# Patient Record
Sex: MALE | Race: WHITE | NOT HISPANIC OR LATINO | Employment: OTHER | URBAN - METROPOLITAN AREA
[De-identification: names, ages, dates, MRNs, and addresses within clinical notes are randomized per-mention and may not be internally consistent; named-entity substitution may affect disease eponyms.]

---

## 2020-11-06 ENCOUNTER — HOSPITAL ENCOUNTER (INPATIENT)
Facility: OTHER | Age: 65
LOS: 4 days | Discharge: HOME | DRG: 244 | End: 2020-11-10
Attending: HOSPITALIST | Admitting: HOSPITALIST
Payer: MEDICARE

## 2020-11-06 DIAGNOSIS — I44.2 COMPLETE HEART BLOCK: ICD-10-CM

## 2020-11-06 DIAGNOSIS — I49.9 ARRHYTHMIA: ICD-10-CM

## 2020-11-06 DIAGNOSIS — Z95.0 PACEMAKER: ICD-10-CM

## 2020-11-06 LAB
ALBUMIN SERPL BCP-MCNC: 3.4 G/DL (ref 3.5–5.2)
ALP SERPL-CCNC: 73 U/L (ref 55–135)
ALT SERPL W/O P-5'-P-CCNC: 24 U/L (ref 10–44)
ANION GAP SERPL CALC-SCNC: 13 MMOL/L (ref 8–16)
APTT BLDCRRT: 28.3 SEC (ref 21–32)
AST SERPL-CCNC: 21 U/L (ref 10–40)
BASOPHILS # BLD AUTO: 0.04 K/UL (ref 0–0.2)
BASOPHILS NFR BLD: 0.4 % (ref 0–1.9)
BILIRUB SERPL-MCNC: 0.9 MG/DL (ref 0.1–1)
BNP SERPL-MCNC: 343 PG/ML (ref 0–99)
BUN SERPL-MCNC: 15 MG/DL (ref 8–23)
CALCIUM SERPL-MCNC: 9.4 MG/DL (ref 8.7–10.5)
CHLORIDE SERPL-SCNC: 105 MMOL/L (ref 95–110)
CO2 SERPL-SCNC: 22 MMOL/L (ref 23–29)
CREAT SERPL-MCNC: 1 MG/DL (ref 0.5–1.4)
DIFFERENTIAL METHOD: ABNORMAL
EOSINOPHIL # BLD AUTO: 0.2 K/UL (ref 0–0.5)
EOSINOPHIL NFR BLD: 2.1 % (ref 0–8)
ERYTHROCYTE [DISTWIDTH] IN BLOOD BY AUTOMATED COUNT: 18.1 % (ref 11.5–14.5)
EST. GFR  (AFRICAN AMERICAN): >60 ML/MIN/1.73 M^2
EST. GFR  (NON AFRICAN AMERICAN): >60 ML/MIN/1.73 M^2
GLUCOSE SERPL-MCNC: 156 MG/DL (ref 70–110)
HCT VFR BLD AUTO: 41.9 % (ref 40–54)
HGB BLD-MCNC: 12.9 G/DL (ref 14–18)
IMM GRANULOCYTES # BLD AUTO: 0.04 K/UL (ref 0–0.04)
IMM GRANULOCYTES NFR BLD AUTO: 0.4 % (ref 0–0.5)
INR PPP: 1 (ref 0.8–1.2)
LYMPHOCYTES # BLD AUTO: 1.7 K/UL (ref 1–4.8)
LYMPHOCYTES NFR BLD: 19 % (ref 18–48)
MAGNESIUM SERPL-MCNC: 2.3 MG/DL (ref 1.6–2.6)
MCH RBC QN AUTO: 29.1 PG (ref 27–31)
MCHC RBC AUTO-ENTMCNC: 30.8 G/DL (ref 32–36)
MCV RBC AUTO: 95 FL (ref 82–98)
MONOCYTES # BLD AUTO: 0.9 K/UL (ref 0.3–1)
MONOCYTES NFR BLD: 9.9 % (ref 4–15)
NEUTROPHILS # BLD AUTO: 6.2 K/UL (ref 1.8–7.7)
NEUTROPHILS NFR BLD: 68.2 % (ref 38–73)
NRBC BLD-RTO: 0 /100 WBC
PHOSPHATE SERPL-MCNC: 3.7 MG/DL (ref 2.7–4.5)
PLATELET # BLD AUTO: 234 K/UL (ref 150–350)
PMV BLD AUTO: 10.3 FL (ref 9.2–12.9)
POTASSIUM SERPL-SCNC: 4.7 MMOL/L (ref 3.5–5.1)
PROT SERPL-MCNC: 7.1 G/DL (ref 6–8.4)
PROTHROMBIN TIME: 11.1 SEC (ref 9–12.5)
RBC # BLD AUTO: 4.43 M/UL (ref 4.6–6.2)
SODIUM SERPL-SCNC: 140 MMOL/L (ref 136–145)
TROPONIN I SERPL DL<=0.01 NG/ML-MCNC: 0.17 NG/ML (ref 0–0.03)
WBC # BLD AUTO: 9.12 K/UL (ref 3.9–12.7)

## 2020-11-06 PROCEDURE — 25000003 PHARM REV CODE 250: Performed by: INTERNAL MEDICINE

## 2020-11-06 PROCEDURE — 84484 ASSAY OF TROPONIN QUANT: CPT

## 2020-11-06 PROCEDURE — 20000000 HC ICU ROOM

## 2020-11-06 PROCEDURE — 99223 1ST HOSP IP/OBS HIGH 75: CPT | Mod: ,,, | Performed by: NURSE PRACTITIONER

## 2020-11-06 PROCEDURE — 83735 ASSAY OF MAGNESIUM: CPT

## 2020-11-06 PROCEDURE — 85610 PROTHROMBIN TIME: CPT

## 2020-11-06 PROCEDURE — 99223 PR INITIAL HOSPITAL CARE,LEVL III: ICD-10-PCS | Mod: ,,, | Performed by: NURSE PRACTITIONER

## 2020-11-06 PROCEDURE — 63600175 PHARM REV CODE 636 W HCPCS: Performed by: NURSE PRACTITIONER

## 2020-11-06 PROCEDURE — 83880 ASSAY OF NATRIURETIC PEPTIDE: CPT

## 2020-11-06 PROCEDURE — 80053 COMPREHEN METABOLIC PANEL: CPT

## 2020-11-06 PROCEDURE — 36415 COLL VENOUS BLD VENIPUNCTURE: CPT

## 2020-11-06 PROCEDURE — 85025 COMPLETE CBC W/AUTO DIFF WBC: CPT

## 2020-11-06 PROCEDURE — 84100 ASSAY OF PHOSPHORUS: CPT

## 2020-11-06 PROCEDURE — 85730 THROMBOPLASTIN TIME PARTIAL: CPT

## 2020-11-06 RX ORDER — SODIUM CHLORIDE 0.9 % (FLUSH) 0.9 %
10 SYRINGE (ML) INJECTION
Status: DISCONTINUED | OUTPATIENT
Start: 2020-11-06 | End: 2020-11-10 | Stop reason: HOSPADM

## 2020-11-06 RX ORDER — FUROSEMIDE 10 MG/ML
20 INJECTION INTRAMUSCULAR; INTRAVENOUS DAILY
Status: DISCONTINUED | OUTPATIENT
Start: 2020-11-07 | End: 2020-11-07

## 2020-11-06 RX ORDER — ONDANSETRON 8 MG/1
8 TABLET, ORALLY DISINTEGRATING ORAL EVERY 8 HOURS PRN
Status: DISCONTINUED | OUTPATIENT
Start: 2020-11-06 | End: 2020-11-07

## 2020-11-06 RX ORDER — CLONIDINE HYDROCHLORIDE 0.1 MG/1
0.1 TABLET ORAL EVERY 4 HOURS PRN
Status: DISCONTINUED | OUTPATIENT
Start: 2020-11-06 | End: 2020-11-07

## 2020-11-06 RX ORDER — HEPARIN SODIUM,PORCINE/D5W 25000/250
12 INTRAVENOUS SOLUTION INTRAVENOUS CONTINUOUS
Status: DISCONTINUED | OUTPATIENT
Start: 2020-11-06 | End: 2020-11-07

## 2020-11-06 RX ORDER — ACETAMINOPHEN 325 MG/1
650 TABLET ORAL EVERY 4 HOURS PRN
Status: DISCONTINUED | OUTPATIENT
Start: 2020-11-06 | End: 2020-11-07

## 2020-11-06 RX ORDER — LOSARTAN POTASSIUM 25 MG/1
25 TABLET ORAL EVERY 12 HOURS
Status: DISCONTINUED | OUTPATIENT
Start: 2020-11-06 | End: 2020-11-07

## 2020-11-06 RX ORDER — ATORVASTATIN CALCIUM 20 MG/1
80 TABLET, FILM COATED ORAL DAILY
Status: DISCONTINUED | OUTPATIENT
Start: 2020-11-07 | End: 2020-11-10 | Stop reason: HOSPADM

## 2020-11-06 RX ADMIN — HEPARIN SODIUM AND DEXTROSE 12 UNITS/KG/HR: 10000; 5 INJECTION INTRAVENOUS at 10:11

## 2020-11-06 RX ADMIN — LOSARTAN POTASSIUM 25 MG: 25 TABLET, FILM COATED ORAL at 09:11

## 2020-11-06 RX ADMIN — CLONIDINE HYDROCHLORIDE 0.1 MG: 0.1 TABLET ORAL at 11:11

## 2020-11-06 NOTE — Clinical Note
Defib pads placed on patient left chest over the apex of heart and on the back on the left medial boarder of the left scapula.

## 2020-11-06 NOTE — Clinical Note
Prepped: groin and radials. Prepped with: ChloraPrep. The site was clipped. The patient was draped.

## 2020-11-06 NOTE — Clinical Note
Lead was connected to generator. A new lead was attached to the following location: right ventricle.

## 2020-11-06 NOTE — Clinical Note
The catheter insertion attempt made ostium   right coronary artery. Angiography performed of the right coronary arteries. Angiography performed via hand injection with . And removed

## 2020-11-06 NOTE — Clinical Note
The catheter is inserted ostium   left main. Angiography performed of the left coronary arteries in multiple views. Angiography performed via hand injection with .

## 2020-11-06 NOTE — Clinical Note
The catheter is inserted ostium   right coronary artery. Angiography performed of the right coronary arteries in multiple views. Angiography performed via hand injection with .

## 2020-11-06 NOTE — Clinical Note
Lead was inserted under fluorscopic guidance. The lead was inserted in the right ventricular apex.

## 2020-11-06 NOTE — Clinical Note
Radial band applied to the right radial artery. 10 cc's of air were inserted into the closure device.

## 2020-11-06 NOTE — Clinical Note
A dressing is applied to the incision. sterristrips and tegaderm applied to incision site in left upper chest

## 2020-11-07 PROBLEM — I10 ESSENTIAL HYPERTENSION: Status: ACTIVE | Noted: 2020-11-07

## 2020-11-07 LAB
ALBUMIN SERPL BCP-MCNC: 3.2 G/DL (ref 3.5–5.2)
ALP SERPL-CCNC: 71 U/L (ref 55–135)
ALT SERPL W/O P-5'-P-CCNC: 22 U/L (ref 10–44)
ANION GAP SERPL CALC-SCNC: 9 MMOL/L (ref 8–16)
APTT BLDCRRT: 35.5 SEC (ref 21–32)
APTT BLDCRRT: 35.5 SEC (ref 21–32)
AST SERPL-CCNC: 19 U/L (ref 10–40)
AV INDEX (PROSTH): 0.47
AV MEAN GRADIENT: 6 MMHG
AV PEAK GRADIENT: 12 MMHG
AV VALVE AREA: 1.65 CM2
AV VELOCITY RATIO: 0.53
BASOPHILS # BLD AUTO: 0.05 K/UL (ref 0–0.2)
BASOPHILS NFR BLD: 0.6 % (ref 0–1.9)
BILIRUB SERPL-MCNC: 1 MG/DL (ref 0.1–1)
BSA FOR ECHO PROCEDURE: 2.23 M2
BUN SERPL-MCNC: 13 MG/DL (ref 8–23)
CALCIUM SERPL-MCNC: 9.2 MG/DL (ref 8.7–10.5)
CHLORIDE SERPL-SCNC: 106 MMOL/L (ref 95–110)
CO2 SERPL-SCNC: 23 MMOL/L (ref 23–29)
CREAT SERPL-MCNC: 0.9 MG/DL (ref 0.5–1.4)
CV ECHO LV RWT: 0.77 CM
DIFFERENTIAL METHOD: ABNORMAL
DOP CALC AO PEAK VEL: 1.76 M/S
DOP CALC AO VTI: 28.18 CM
DOP CALC LVOT AREA: 3.5 CM2
DOP CALC LVOT DIAMETER: 2.12 CM
DOP CALC LVOT PEAK VEL: 0.94 M/S
DOP CALC LVOT STROKE VOLUME: 46.36 CM3
DOP CALCLVOT PEAK VEL VTI: 13.14 CM
E WAVE DECELERATION TIME: 256.71 MSEC
E/A RATIO: 1.22
ECHO LV POSTERIOR WALL: 1.31 CM (ref 0.6–1.1)
EOSINOPHIL # BLD AUTO: 0.2 K/UL (ref 0–0.5)
EOSINOPHIL NFR BLD: 2.1 % (ref 0–8)
ERYTHROCYTE [DISTWIDTH] IN BLOOD BY AUTOMATED COUNT: 18 % (ref 11.5–14.5)
EST. GFR  (AFRICAN AMERICAN): >60 ML/MIN/1.73 M^2
EST. GFR  (NON AFRICAN AMERICAN): >60 ML/MIN/1.73 M^2
FRACTIONAL SHORTENING: 9 % (ref 28–44)
GLUCOSE SERPL-MCNC: 158 MG/DL (ref 70–110)
HCT VFR BLD AUTO: 40.2 % (ref 40–54)
HGB BLD-MCNC: 12.6 G/DL (ref 14–18)
IMM GRANULOCYTES # BLD AUTO: 0.02 K/UL (ref 0–0.04)
IMM GRANULOCYTES NFR BLD AUTO: 0.3 % (ref 0–0.5)
INTERVENTRICULAR SEPTUM: 1.35 CM (ref 0.6–1.1)
LA MAJOR: 6.05 CM
LA MINOR: 6.12 CM
LA WIDTH: 3.95 CM
LEFT ATRIUM SIZE: 4.23 CM
LEFT ATRIUM VOLUME INDEX: 39.3 ML/M2
LEFT ATRIUM VOLUME: 86.42 CM3
LEFT INTERNAL DIMENSION IN SYSTOLE: 3.08 CM (ref 2.1–4)
LEFT VENTRICLE DIASTOLIC VOLUME INDEX: 21.56 ML/M2
LEFT VENTRICLE DIASTOLIC VOLUME: 47.46 ML
LEFT VENTRICLE MASS INDEX: 70 G/M2
LEFT VENTRICLE SYSTOLIC VOLUME INDEX: 17 ML/M2
LEFT VENTRICLE SYSTOLIC VOLUME: 37.38 ML
LEFT VENTRICULAR INTERNAL DIMENSION IN DIASTOLE: 3.4 CM (ref 3.5–6)
LEFT VENTRICULAR MASS: 153.06 G
LYMPHOCYTES # BLD AUTO: 1.7 K/UL (ref 1–4.8)
LYMPHOCYTES NFR BLD: 21.8 % (ref 18–48)
MAGNESIUM SERPL-MCNC: 2.2 MG/DL (ref 1.6–2.6)
MCH RBC QN AUTO: 28.8 PG (ref 27–31)
MCHC RBC AUTO-ENTMCNC: 31.3 G/DL (ref 32–36)
MCV RBC AUTO: 92 FL (ref 82–98)
MONOCYTES # BLD AUTO: 0.7 K/UL (ref 0.3–1)
MONOCYTES NFR BLD: 8.5 % (ref 4–15)
MV PEAK A VEL: 0.46 M/S
MV PEAK E VEL: 0.56 M/S
MV STENOSIS PRESSURE HALF TIME: 74.45 MS
MV VALVE AREA P 1/2 METHOD: 2.96 CM2
NEUTROPHILS # BLD AUTO: 5.3 K/UL (ref 1.8–7.7)
NEUTROPHILS NFR BLD: 66.7 % (ref 38–73)
NRBC BLD-RTO: 0 /100 WBC
PHOSPHATE SERPL-MCNC: 4.4 MG/DL (ref 2.7–4.5)
PISA TR MAX VEL: 2.2 M/S
PLATELET # BLD AUTO: 238 K/UL (ref 150–350)
PMV BLD AUTO: 10.2 FL (ref 9.2–12.9)
POTASSIUM SERPL-SCNC: 4.9 MMOL/L (ref 3.5–5.1)
PROT SERPL-MCNC: 6.5 G/DL (ref 6–8.4)
PV PEAK VELOCITY: 0.9 CM/S
RA MAJOR: 5.49 CM
RA WIDTH: 4.08 CM
RBC # BLD AUTO: 4.37 M/UL (ref 4.6–6.2)
SINUS: 3.49 CM
SODIUM SERPL-SCNC: 138 MMOL/L (ref 136–145)
STJ: 2.82 CM
TR MAX PG: 19 MMHG
WBC # BLD AUTO: 7.98 K/UL (ref 3.9–12.7)

## 2020-11-07 PROCEDURE — 93010 EKG 12-LEAD: ICD-10-PCS | Mod: ,,, | Performed by: INTERNAL MEDICINE

## 2020-11-07 PROCEDURE — 63600175 PHARM REV CODE 636 W HCPCS: Performed by: NURSE PRACTITIONER

## 2020-11-07 PROCEDURE — 25000003 PHARM REV CODE 250: Performed by: NURSE PRACTITIONER

## 2020-11-07 PROCEDURE — 99223 PR INITIAL HOSPITAL CARE,LEVL III: ICD-10-PCS | Mod: ,,, | Performed by: INTERNAL MEDICINE

## 2020-11-07 PROCEDURE — 93010 ELECTROCARDIOGRAM REPORT: CPT | Mod: ,,, | Performed by: INTERNAL MEDICINE

## 2020-11-07 PROCEDURE — 84100 ASSAY OF PHOSPHORUS: CPT

## 2020-11-07 PROCEDURE — 99233 PR SUBSEQUENT HOSPITAL CARE,LEVL III: ICD-10-PCS | Mod: ,,, | Performed by: HOSPITALIST

## 2020-11-07 PROCEDURE — 85025 COMPLETE CBC W/AUTO DIFF WBC: CPT

## 2020-11-07 PROCEDURE — 27200188 HC TRANSDUCER, ART ADULT/PEDS

## 2020-11-07 PROCEDURE — 99223 1ST HOSP IP/OBS HIGH 75: CPT | Mod: ,,, | Performed by: INTERNAL MEDICINE

## 2020-11-07 PROCEDURE — 80053 COMPREHEN METABOLIC PANEL: CPT

## 2020-11-07 PROCEDURE — 51702 INSERT TEMP BLADDER CATH: CPT

## 2020-11-07 PROCEDURE — 25000003 PHARM REV CODE 250: Performed by: INTERNAL MEDICINE

## 2020-11-07 PROCEDURE — 20000000 HC ICU ROOM

## 2020-11-07 PROCEDURE — 99233 SBSQ HOSP IP/OBS HIGH 50: CPT | Mod: ,,, | Performed by: HOSPITALIST

## 2020-11-07 PROCEDURE — 83735 ASSAY OF MAGNESIUM: CPT

## 2020-11-07 PROCEDURE — 99900035 HC TECH TIME PER 15 MIN (STAT)

## 2020-11-07 PROCEDURE — 63600175 PHARM REV CODE 636 W HCPCS: Performed by: INTERNAL MEDICINE

## 2020-11-07 PROCEDURE — 93041 RHYTHM ECG TRACING: CPT

## 2020-11-07 PROCEDURE — 36415 COLL VENOUS BLD VENIPUNCTURE: CPT

## 2020-11-07 PROCEDURE — 85730 THROMBOPLASTIN TIME PARTIAL: CPT

## 2020-11-07 PROCEDURE — 93005 ELECTROCARDIOGRAM TRACING: CPT

## 2020-11-07 RX ORDER — LOSARTAN POTASSIUM 50 MG/1
50 TABLET ORAL EVERY 12 HOURS
Status: DISCONTINUED | OUTPATIENT
Start: 2020-11-08 | End: 2020-11-10 | Stop reason: HOSPADM

## 2020-11-07 RX ORDER — ALPRAZOLAM 0.25 MG/1
0.25 TABLET ORAL 3 TIMES DAILY PRN
Status: DISCONTINUED | OUTPATIENT
Start: 2020-11-07 | End: 2020-11-10 | Stop reason: HOSPADM

## 2020-11-07 RX ORDER — HEPARIN SODIUM 5000 [USP'U]/ML
5000 INJECTION, SOLUTION INTRAVENOUS; SUBCUTANEOUS EVERY 8 HOURS
Status: COMPLETED | OUTPATIENT
Start: 2020-11-07 | End: 2020-11-08

## 2020-11-07 RX ORDER — LOSARTAN POTASSIUM 50 MG/1
100 TABLET ORAL DAILY
Status: DISCONTINUED | OUTPATIENT
Start: 2020-11-07 | End: 2020-11-07

## 2020-11-07 RX ORDER — MUPIROCIN 20 MG/G
OINTMENT TOPICAL 2 TIMES DAILY
Status: DISCONTINUED | OUTPATIENT
Start: 2020-11-07 | End: 2020-11-10 | Stop reason: HOSPADM

## 2020-11-07 RX ORDER — ONDANSETRON 2 MG/ML
8 INJECTION INTRAMUSCULAR; INTRAVENOUS EVERY 8 HOURS PRN
Status: DISCONTINUED | OUTPATIENT
Start: 2020-11-07 | End: 2020-11-09

## 2020-11-07 RX ORDER — TRAMADOL HYDROCHLORIDE 50 MG/1
50 TABLET ORAL EVERY 6 HOURS PRN
Status: DISCONTINUED | OUTPATIENT
Start: 2020-11-07 | End: 2020-11-10 | Stop reason: HOSPADM

## 2020-11-07 RX ORDER — HYDROCHLOROTHIAZIDE 12.5 MG/1
25 TABLET ORAL DAILY
Status: DISCONTINUED | OUTPATIENT
Start: 2020-11-07 | End: 2020-11-07

## 2020-11-07 RX ORDER — CLONIDINE HYDROCHLORIDE 0.1 MG/1
0.1 TABLET ORAL EVERY 4 HOURS PRN
Status: DISCONTINUED | OUTPATIENT
Start: 2020-11-07 | End: 2020-11-07

## 2020-11-07 RX ORDER — LOSARTAN POTASSIUM 50 MG/1
50 TABLET ORAL ONCE
Status: COMPLETED | OUTPATIENT
Start: 2020-11-07 | End: 2020-11-07

## 2020-11-07 RX ORDER — NIFEDIPINE 30 MG/1
30 TABLET, EXTENDED RELEASE ORAL DAILY
Status: DISCONTINUED | OUTPATIENT
Start: 2020-11-07 | End: 2020-11-09

## 2020-11-07 RX ORDER — CLONIDINE HYDROCHLORIDE 0.1 MG/1
0.2 TABLET ORAL 3 TIMES DAILY
Status: DISCONTINUED | OUTPATIENT
Start: 2020-11-07 | End: 2020-11-07

## 2020-11-07 RX ORDER — FUROSEMIDE 40 MG/1
40 TABLET ORAL DAILY
Status: DISCONTINUED | OUTPATIENT
Start: 2020-11-08 | End: 2020-11-10

## 2020-11-07 RX ORDER — FUROSEMIDE 20 MG/1
20 TABLET ORAL DAILY
Status: DISCONTINUED | OUTPATIENT
Start: 2020-11-08 | End: 2020-11-07

## 2020-11-07 RX ORDER — ASPIRIN 81 MG/1
81 TABLET ORAL DAILY
Status: DISCONTINUED | OUTPATIENT
Start: 2020-11-07 | End: 2020-11-10 | Stop reason: HOSPADM

## 2020-11-07 RX ORDER — TALC
3 POWDER (GRAM) TOPICAL ONCE
Status: COMPLETED | OUTPATIENT
Start: 2020-11-07 | End: 2020-11-07

## 2020-11-07 RX ORDER — ACETAMINOPHEN 500 MG
1000 TABLET ORAL EVERY 8 HOURS PRN
Status: DISCONTINUED | OUTPATIENT
Start: 2020-11-07 | End: 2020-11-10 | Stop reason: HOSPADM

## 2020-11-07 RX ADMIN — HEPARIN SODIUM 5000 UNITS: 5000 INJECTION INTRAVENOUS; SUBCUTANEOUS at 03:11

## 2020-11-07 RX ADMIN — CLONIDINE HYDROCHLORIDE 0.2 MG: 0.1 TABLET ORAL at 08:11

## 2020-11-07 RX ADMIN — TRAMADOL HYDROCHLORIDE 50 MG: 50 TABLET, FILM COATED ORAL at 11:11

## 2020-11-07 RX ADMIN — ALPRAZOLAM 0.25 MG: 0.25 TABLET ORAL at 08:11

## 2020-11-07 RX ADMIN — HYDROCHLOROTHIAZIDE 25 MG: 12.5 TABLET ORAL at 11:11

## 2020-11-07 RX ADMIN — ATORVASTATIN CALCIUM 80 MG: 20 TABLET, FILM COATED ORAL at 08:11

## 2020-11-07 RX ADMIN — LOSARTAN POTASSIUM 100 MG: 50 TABLET ORAL at 08:11

## 2020-11-07 RX ADMIN — ASPIRIN 81 MG: 81 TABLET, COATED ORAL at 11:11

## 2020-11-07 RX ADMIN — CLONIDINE HYDROCHLORIDE 0.1 MG: 0.1 TABLET ORAL at 03:11

## 2020-11-07 RX ADMIN — CLONIDINE HYDROCHLORIDE 0.2 MG: 0.1 TABLET ORAL at 04:11

## 2020-11-07 RX ADMIN — Medication 3 MG: at 01:11

## 2020-11-07 RX ADMIN — LOSARTAN POTASSIUM 50 MG: 50 TABLET ORAL at 01:11

## 2020-11-07 RX ADMIN — HEPARIN SODIUM 5000 UNITS: 5000 INJECTION INTRAVENOUS; SUBCUTANEOUS at 09:11

## 2020-11-07 RX ADMIN — FUROSEMIDE 20 MG: 10 INJECTION, SOLUTION INTRAMUSCULAR; INTRAVENOUS at 08:11

## 2020-11-07 RX ADMIN — NIFEDIPINE 30 MG: 30 TABLET, FILM COATED, EXTENDED RELEASE ORAL at 08:11

## 2020-11-07 RX ADMIN — TRAMADOL HYDROCHLORIDE 50 MG: 50 TABLET, FILM COATED ORAL at 05:11

## 2020-11-07 NOTE — SUBJECTIVE & OBJECTIVE
No past medical history on file.    No past surgical history on file.    Review of patient's allergies indicates:  No Known Allergies    No current facility-administered medications on file prior to encounter.      No current outpatient medications on file prior to encounter.     Family History     Family history is unknown by patient.        Tobacco Use    Smoking status: Not on file   Substance and Sexual Activity    Alcohol use: Not on file    Drug use: Not on file    Sexual activity: Not on file     Review of Systems   Constitutional: Positive for activity change and fatigue. Negative for appetite change and fever.   HENT: Negative for congestion, ear pain and postnasal drip.    Eyes: Negative for discharge.   Respiratory: Positive for shortness of breath. Negative for apnea and wheezing.    Cardiovascular: Negative for chest pain and leg swelling.   Gastrointestinal: Negative for abdominal distention, abdominal pain, nausea and vomiting.   Endocrine: Negative for polydipsia, polyphagia and polyuria.   Genitourinary: Negative for difficulty urinating, flank pain, frequency, hematuria and urgency.   Musculoskeletal: Positive for myalgias. Negative for arthralgias and joint swelling.   Skin: Negative for pallor and rash.   Allergic/Immunologic: Negative for environmental allergies and food allergies.   Neurological: Negative for dizziness, speech difficulty, weakness, light-headedness and headaches.   Hematological: Does not bruise/bleed easily.   Psychiatric/Behavioral: Negative for agitation.     Objective:     Vital Signs (Most Recent):  Temp: 98.5 °F (36.9 °C) (11/06/20 2015)  Pulse: 60 (11/06/20 2300)  Resp: (!) 34 (11/06/20 2300)  BP: (!) 197/88 (11/06/20 2315)  SpO2: 98 % (11/06/20 2300) Vital Signs (24h Range):  Temp:  [98.5 °F (36.9 °C)] 98.5 °F (36.9 °C)  Pulse:  [60] 60  Resp:  [16-34] 34  SpO2:  [96 %-100 %] 98 %  BP: (148-198)/() 197/88     Weight: 98.4 kg (216 lb 14.9 oz)  Body mass index  is 29.42 kg/m².    Physical Exam  Constitutional:       Appearance: Normal appearance. He is well-developed.   HENT:      Head: Normocephalic.   Eyes:      Conjunctiva/sclera: Conjunctivae normal.   Neck:      Musculoskeletal: Normal range of motion and neck supple.   Cardiovascular:      Rate and Rhythm: Normal rate.      Pulses:           Radial pulses are 2+ on the right side and 2+ on the left side.        Dorsalis pedis pulses are 2+ on the right side and 2+ on the left side.      Heart sounds: Normal heart sounds.      Comments: 100% paced  Pulmonary:      Effort: Pulmonary effort is normal. Tachypnea present.      Breath sounds: Normal breath sounds.   Abdominal:      General: Bowel sounds are increased. There is no distension.      Palpations: Abdomen is soft.      Tenderness: There is no abdominal tenderness.   Musculoskeletal: Normal range of motion.   Skin:     General: Skin is warm and dry.      Capillary Refill: Capillary refill takes 2 to 3 seconds.   Neurological:      Mental Status: He is alert and oriented to person, place, and time.      GCS: GCS eye subscore is 4. GCS verbal subscore is 5. GCS motor subscore is 6.      Motor: Motor function is intact.   Psychiatric:         Mood and Affect: Mood normal.         Speech: Speech normal.         Behavior: Behavior normal.             Significant Labs:   CBC:   Recent Labs   Lab 11/06/20 2056   WBC 9.12   HGB 12.9*   HCT 41.9        CMP:   Recent Labs   Lab 11/06/20 2055      K 4.7      CO2 22*   *   BUN 15   CREATININE 1.0   CALCIUM 9.4   PROT 7.1   ALBUMIN 3.4*   BILITOT 0.9   ALKPHOS 73   AST 21   ALT 24   ANIONGAP 13   EGFRNONAA >60       Significant Imaging: I have reviewed all pertinent imaging results/findings within the past 24 hours.

## 2020-11-07 NOTE — PLAN OF CARE
CM met with pt for initial discharge planning assessment. Pt sleeping soundly, arouses.    Pt lives alone, has no PCP at this time, CM attempt to help pt find PCP.    Pt uses a cane for DME, has no HH and is not on HD.    Pt's sister lives next door to pt.    CM to follow for plans and arrangemetns.   11/07/20 1630   Discharge Assessment   Assessment Type Discharge Planning Assessment   Confirmed/corrected address and phone number on facesheet? Yes   Assessment information obtained from? Patient;Medical Record   Expected Length of Stay (days) 3   Communicated expected length of stay with patient/caregiver yes   Prior to hospitilization cognitive status: Alert/Oriented   Prior to hospitalization functional status: Assistive Equipment;Independent   Current cognitive status: Alert/Oriented   Current Functional Status: Independent   Lives With alone   Able to Return to Prior Arrangements yes   Is patient able to care for self after discharge? Yes   Patient's perception of discharge disposition home or selfcare   Readmission Within the Last 30 Days no previous admission in last 30 days   Patient currently being followed by outpatient case management? No   Patient currently receives any other outside agency services? Yes   Equipment Currently Used at Home cane, straight   Do you have any problems affording any of your prescribed medications? TBD   Is the patient taking medications as prescribed? yes

## 2020-11-07 NOTE — PROGRESS NOTES
Pt remains hypertensive despite additional Losartan and PRN clonidine x2. Spoke with Dr. Braxton in eICU a second time regarding BP. Order for an additional 0.1 of clonidine now and that she will modify orders. Safety measures remain in place.

## 2020-11-07 NOTE — EICU
/77,PR59/MIN    On losartan 25 mg q 12hours    On camera alert and awake    Admitted with complete heart block      Plan:  Losartan 50 mg now and increased daily dose to 100mg daily starting at 9AM

## 2020-11-07 NOTE — CONSULTS
[facility]    Date of admission:  11/6/2020     Chief Complaint  Fatigue and exertional dyspnea    HPI:  This 65-year-old male with no prior cardiac history presented to [facility] in [place] for worsening fatigue.  He has been getting dyspneic with minimal exertion.  He has had no lightheadedness or syncope.  His symptoms have been ongoing for at least 4 weeks.  When he exerts himself he has no chest discomfort.  On presentation to the emergency department, he was found to be in complete heart block.  He was transferred here for definitive therapy.  A temporary pacemaker was implanted prior to transfer.  He was also started on treatment for acute coronary syndrome.    On questioning, he may have a remote history of atrial fibrillation.  This diagnosis was entertained in [place] about 10 years ago.  He was not started on any medications.  He did not follow-up with any physicians regarding that possible diagnosis.  He is not taking any regular medications at home.    Medications  Current Facility-Administered Medications   Medication Dose Route Frequency Provider Last Rate Last Dose    acetaminophen tablet 650 mg  650 mg Oral Q4H PRN Dheeraj Lopez NP        ALPRAZolam tablet 0.25 mg  0.25 mg Oral TID PRN Dheeraj Lopez NP        atorvastatin tablet 80 mg  80 mg Oral Daily Dheeraj Lopez NP   80 mg at 11/07/20 0832    cloNIDine tablet 0.2 mg  0.2 mg Oral TID Suyapa Braxton MD   0.2 mg at 11/07/20 0832    furosemide injection 20 mg  20 mg Intravenous Daily Dheeraj Lopez NP   20 mg at 11/07/20 0831    heparin 25,000 units in dextrose 5% (100 units/ml) IV bolus from bag - ADDITIONAL PRN BOLUS - 30 units/kg (max bolus 4000 units)  30 Units/kg (Adjusted) Intravenous PRN Dheeraj Lopez NP   2,580 Units at 11/07/20 0554    heparin 25,000 units in dextrose 5% (100 units/ml) IV bolus from bag - ADDITIONAL PRN BOLUS - 60 units/kg (max bolus 4000 units)   46.6 Units/kg (Adjusted) Intravenous PRN Dheeraj Lopez NP        heparin 25,000 units in dextrose 5% 250 mL (100 units/mL) infusion LOW INTENSITY nomogram - [facility]  12 Units/kg/hr (Adjusted) Intravenous Continuous Dheeraj Lopez NP 12 mL/hr at 11/07/20 0547 14 Units/kg/hr at 11/07/20 0547    influenza (QUADRIVALENT ADJUVANTED PF) vaccine 0.5 mL  0.5 mL Intramuscular vaccine x 1 dose Dung Mercedes MD        losartan tablet 100 mg  100 mg Oral Daily Suyapa Braxton MD   100 mg at 11/07/20 0841    mupirocin 2 % ointment   Nasal BID Dung Mercedes MD        ondansetron disintegrating tablet 8 mg  8 mg Oral Q8H PRN Dheeraj Lopez NP        pneumoc 13-jermaine conj-dip cr(PF) (PREVNAR 13 (PF)) 0.5 mL  0.5 mL Intramuscular vaccine x 1 dose Dung Mercedes MD        sodium chloride 0.9% flush 10 mL  10 mL Intravenous PRN Dheeraj Lopez NP        traMADoL tablet 50 mg  50 mg Oral Q6H PRN Dheeraj Lopez NP   50 mg at 11/07/20 0522      Prior to Admission medications    Not on File       History  No past medical history on file.  No past surgical history on file.  Social History     Socioeconomic History    Marital status: Unknown     Spouse name: Not on file    Number of children: Not on file    Years of education: Not on file    Highest education level: Not on file   Occupational History    Not on file   Social Needs    Financial resource strain: Not on file    Food insecurity     Worry: Not on file     Inability: Not on file    Transportation needs     Medical: Not on file     Non-medical: Not on file   Tobacco Use    Smoking status: Not on file   Substance and Sexual Activity    Alcohol use: Not on file    Drug use: Not on file    Sexual activity: Not on file   Lifestyle    Physical activity     Days per week: Not on file     Minutes per session: Not on file    Stress: Not on file   Relationships    Social connections     Talks on phone: Not on file     Gets  together: Not on file     Attends Gnosticist service: Not on file     Active member of club or organization: Not on file     Attends meetings of clubs or organizations: Not on file     Relationship status: Not on file   Other Topics Concern    Not on file   Social History Narrative    Not on file     Family History   Family history unknown: Yes        Allergies  Review of patient's allergies indicates:  No Known Allergies    Review of Systems   Review of Systems   Constitution: Positive for malaise/fatigue. Negative for weight gain and weight loss.   Eyes: Negative for visual disturbance.   Cardiovascular: Positive for dyspnea on exertion. Negative for chest pain, claudication, cyanosis, irregular heartbeat, leg swelling, near-syncope, orthopnea, palpitations, paroxysmal nocturnal dyspnea and syncope.   Respiratory: Negative for cough, hemoptysis, shortness of breath, sleep disturbances due to breathing and wheezing.    Hematologic/Lymphatic: Negative for bleeding problem. Does not bruise/bleed easily.   Skin: Negative for poor wound healing.   Musculoskeletal: Negative for muscle cramps and myalgias.   Gastrointestinal: Negative for abdominal pain, anorexia, diarrhea, heartburn, hematemesis, hematochezia, melena, nausea and vomiting.   Genitourinary: Negative for hematuria and nocturia.   Neurological: Negative for excessive daytime sleepiness, dizziness, focal weakness, light-headedness and weakness.       Physical Exam  Temp:  [97.9 °F (36.6 °C)-98 °F (36.7 °C)]   Pulse:  [60]   Resp:  [16-34]   BP: (141-198)/(62-88)   SpO2:  [92 %-98 %]    Wt Readings from Last 1 Encounters:   11/06/20 98.4 kg (216 lb 14.9 oz)     Physical Exam   Constitutional: He is oriented to person, place, and time. He is cooperative.  Non-toxic appearance. No distress.   HENT:   Head: Normocephalic and atraumatic.   Eyes: Conjunctivae are normal. No scleral icterus.   Neck: Neck supple. No hepatojugular reflux and no JVD present. Carotid  bruit is not present. No tracheal deviation present. No thyromegaly present.   Cardiovascular: Normal rate, regular rhythm, S1 normal and S2 normal.  No extrasystoles are present. PMI is not displaced. Exam reveals no S3 and no S4.   No murmur heard.  Pulses:       Carotid pulses are 2+ on the right side and 2+ on the left side.       Radial pulses are 2+ on the right side and 2+ on the left side.        Dorsalis pedis pulses are 2+ on the right side and 2+ on the left side.        Posterior tibial pulses are 2+ on the right side and 2+ on the left side.   There is a 5 [place] sheath in the right femoral vein through which the temporary pacemaker is inserted.   Pulmonary/Chest: No accessory muscle usage. No respiratory distress. He has no decreased breath sounds. He has no wheezes. He has no rhonchi. He has no rales.   Abdominal: Soft. Bowel sounds are normal. He exhibits no pulsatile liver, no abdominal bruit and no pulsatile midline mass. There is no splenomegaly or hepatomegaly. There is no abdominal tenderness.   Musculoskeletal:         General: No tenderness, deformity or edema.   Neurological: He is alert and oriented to person, place, and time. He has normal strength. No cranial nerve deficit or sensory deficit.   Skin: Skin is warm, dry and intact. He is not diaphoretic. No cyanosis. No pallor. Nails show no clubbing.   Psychiatric: He has a normal mood and affect. His speech is normal and behavior is normal.       Telemetry  Ventricular pacing    EKG  Sinus rhythm with complete heart block.  Ventricular escape rhythm around 30 beats per minute    Echocardiogram  Echocardiogram performed at [facility] hospital suggested an ejection fraction approximately 40%.  The study will be repeated here.    Labs  Recent Results (from the past 72 hour(s))   APTT    Collection Time: 11/06/20  8:55 PM   Result Value Ref Range    aPTT 28.3 21.0 - 32.0 sec   Protime-INR    Collection Time: 11/06/20  8:55 PM   Result Value  Ref Range    Prothrombin Time 11.1 9.0 - 12.5 sec    INR 1.0 0.8 - 1.2   Troponin I    Collection Time: 11/06/20  8:55 PM   Result Value Ref Range    Troponin I 0.166 (H) 0.000 - 0.026 ng/mL   Comprehensive Metabolic Panel (CMP)    Collection Time: 11/06/20  8:55 PM   Result Value Ref Range    Sodium 140 136 - 145 mmol/L    Potassium 4.7 3.5 - 5.1 mmol/L    Chloride 105 95 - 110 mmol/L    CO2 22 (L) 23 - 29 mmol/L    Glucose 156 (H) 70 - 110 mg/dL    BUN 15 8 - 23 mg/dL    Creatinine 1.0 0.5 - 1.4 mg/dL    Calcium 9.4 8.7 - 10.5 mg/dL    Total Protein 7.1 6.0 - 8.4 g/dL    Albumin 3.4 (L) 3.5 - 5.2 g/dL    Total Bilirubin 0.9 0.1 - 1.0 mg/dL    Alkaline Phosphatase 73 55 - 135 U/L    AST 21 10 - 40 U/L    ALT 24 10 - 44 U/L    Anion Gap 13 8 - 16 mmol/L    eGFR if African American >60 >60 mL/min/1.73 m^2    eGFR if non African American >60 >60 mL/min/1.73 m^2   Magnesium    Collection Time: 11/06/20  8:55 PM   Result Value Ref Range    Magnesium 2.3 1.6 - 2.6 mg/dL   Phosphorus    Collection Time: 11/06/20  8:55 PM   Result Value Ref Range    Phosphorus 3.7 2.7 - 4.5 mg/dL   CBC auto differential    Collection Time: 11/06/20  8:56 PM   Result Value Ref Range    WBC 9.12 3.90 - 12.70 K/uL    RBC 4.43 (L) 4.60 - 6.20 M/uL    Hemoglobin 12.9 (L) 14.0 - 18.0 g/dL    Hematocrit 41.9 40.0 - 54.0 %    MCV 95 82 - 98 fL    MCH 29.1 27.0 - 31.0 pg    MCHC 30.8 (L) 32.0 - 36.0 g/dL    RDW 18.1 (H) 11.5 - 14.5 %    Platelets 234 150 - 350 K/uL    MPV 10.3 9.2 - 12.9 fL    Immature Granulocytes 0.4 0.0 - 0.5 %    Gran # (ANC) 6.2 1.8 - 7.7 K/uL    Immature Grans (Abs) 0.04 0.00 - 0.04 K/uL    Lymph # 1.7 1.0 - 4.8 K/uL    Mono # 0.9 0.3 - 1.0 K/uL    Eos # 0.2 0.0 - 0.5 K/uL    Baso # 0.04 0.00 - 0.20 K/uL    nRBC 0 0 /100 WBC    Gran % 68.2 38.0 - 73.0 %    Lymph % 19.0 18.0 - 48.0 %    Mono % 9.9 4.0 - 15.0 %    Eosinophil % 2.1 0.0 - 8.0 %    Basophil % 0.4 0.0 - 1.9 %    Differential Method Automated    Brain natriuretic  peptide    Collection Time: 11/06/20  8:56 PM   Result Value Ref Range     (H) 0 - 99 pg/mL   APTT    Collection Time: 11/07/20  4:46 AM   Result Value Ref Range    aPTT 35.5 (H) 21.0 - 32.0 sec   Comprehensive Metabolic Panel (CMP)    Collection Time: 11/07/20  4:46 AM   Result Value Ref Range    Sodium 138 136 - 145 mmol/L    Potassium 4.9 3.5 - 5.1 mmol/L    Chloride 106 95 - 110 mmol/L    CO2 23 23 - 29 mmol/L    Glucose 158 (H) 70 - 110 mg/dL    BUN 13 8 - 23 mg/dL    Creatinine 0.9 0.5 - 1.4 mg/dL    Calcium 9.2 8.7 - 10.5 mg/dL    Total Protein 6.5 6.0 - 8.4 g/dL    Albumin 3.2 (L) 3.5 - 5.2 g/dL    Total Bilirubin 1.0 0.1 - 1.0 mg/dL    Alkaline Phosphatase 71 55 - 135 U/L    AST 19 10 - 40 U/L    ALT 22 10 - 44 U/L    Anion Gap 9 8 - 16 mmol/L    eGFR if African American >60 >60 mL/min/1.73 m^2    eGFR if non African American >60 >60 mL/min/1.73 m^2   Magnesium    Collection Time: 11/07/20  4:46 AM   Result Value Ref Range    Magnesium 2.2 1.6 - 2.6 mg/dL   Phosphorus    Collection Time: 11/07/20  4:46 AM   Result Value Ref Range    Phosphorus 4.4 2.7 - 4.5 mg/dL   CBC with Automated Differential    Collection Time: 11/07/20  4:46 AM   Result Value Ref Range    WBC 7.98 3.90 - 12.70 K/uL    RBC 4.37 (L) 4.60 - 6.20 M/uL    Hemoglobin 12.6 (L) 14.0 - 18.0 g/dL    Hematocrit 40.2 40.0 - 54.0 %    MCV 92 82 - 98 fL    MCH 28.8 27.0 - 31.0 pg    MCHC 31.3 (L) 32.0 - 36.0 g/dL    RDW 18.0 (H) 11.5 - 14.5 %    Platelets 238 150 - 350 K/uL    MPV 10.2 9.2 - 12.9 fL    Immature Granulocytes 0.3 0.0 - 0.5 %    Gran # (ANC) 5.3 1.8 - 7.7 K/uL    Immature Grans (Abs) 0.02 0.00 - 0.04 K/uL    Lymph # 1.7 1.0 - 4.8 K/uL    Mono # 0.7 0.3 - 1.0 K/uL    Eos # 0.2 0.0 - 0.5 K/uL    Baso # 0.05 0.00 - 0.20 K/uL    nRBC 0 0 /100 WBC    Gran % 66.7 38.0 - 73.0 %    Lymph % 21.8 18.0 - 48.0 %    Mono % 8.5 4.0 - 15.0 %    Eosinophil % 2.1 0.0 - 8.0 %    Basophil % 0.6 0.0 - 1.9 %    Differential Method Automated    APTT     Collection Time: 11/07/20  4:46 AM   Result Value Ref Range    aPTT 35.5 (H) 21.0 - 32.0 sec        Imaging  No results found.    Assessment  1.  Complete heart block  Symptomatic with left ventricular escape rhythm.  His temporary pacemaker is functioning well.  Capture threshold now is about 1.  He continues to have an underlying ventricular escape rhythm.    2.  Acute coronary syndrome?  I am uncertain why this diagnosis was entertained.  His cardiac enzymes do not seem to be significantly elevated.  He has no angina.  His exertional dyspnea could certainly all be explained by the complete heart block.    3.  Hypertension  Blood pressures have been elevated but no clear diagnosis in the past.  Lack of diagnosis is probably due to no regular medical follow-up.    Plan and Discussion  Echocardiogram today to reassess left ventricular contractility.  Plan for pacemaker implantation on Monday. The procedure, its risks, benefits and alternatives were discussed in detail.  All questions were answered.  Appropriate consents were signed.  Depending on results of initial evaluation, decide about appropriateness of an ischemic workup prior to implantation.  He was also counseled on the importance of smoking cessation.      Nolan Hernandez MD

## 2020-11-07 NOTE — EICU
/80    Received clonidine 0.1 mg 1 hour ago   On clonidine 0.1 mg every 4 hours prn     Plan:  Ordered clonidine 0.2 mg now and TID  Discontinued the PRN clonidine as it may cause rebound hypertension

## 2020-11-07 NOTE — PLAN OF CARE
See previous notes regarding pt's care since transfer and orders received. Pt remains hypertensive. Pt requires almost constant reminding that he cannot get up out of bed and that right leg should remain straight. Pt with little sleep despite melatonin administration. Pt becoming restless, complaining of severe back pain related to not being allowed to get out of bed, CHEKO Lopez made aware of pt's complaints. Pt remains on heparin gtt, awaiting results of AM aPTT. Bed alarm remains on, and other safety measures remain in place. Will continue to monitor.

## 2020-11-07 NOTE — CARE UPDATE
[facility] TRANSPORT NOTE     Date of Transport: 2020  : 1955  Age: 64 y.o.  Medication Dosing Weight: 103 kg  Sex: male  Race: Unknown    MRN: [id number]  Time Of Patient Handoff: 20:00    ASSESSMENT/INTERVENTIONS     This patient was transported by [facility] from the ICU of [facility] in [place] by [facility]. The patient's overall condition remained unchanged throughout transport, with all vital signs remaining stable per the patient's current baseline. All lines, tubes, and devices remained patent and intact. The patient was transferred from the [facility] stretcher to ICU bed 8 where care was transitioned to bedside Debora ROSE without incident. The patient's Personal Belongings and OSH Chart and Diagnostic Disk(s) were left with receiving clinician.     Last set of vital signs at transfer of care as follows:    HR 60, /72, RR 14, SpO2 97% on room air Temp 98.5 F.         FOLLOW-UP     Call [facility], Wale Fulton RN at [phone] for additional questions or concerns.

## 2020-11-07 NOTE — PROGRESS NOTES
Pt remains hypertensive. eICU called, spoke with Celina, and notified of need for PRN medication. Advised that message would be sent to eICU physician covering unit.

## 2020-11-07 NOTE — H&P
[facility] Hospital Medicine  History & Physical    Patient Name: Darren Patel  MRN: [id number]  Admission Date: 11/6/2020  Attending Physician: Dung Mercedes MD   Primary Care Provider: No primary care provider on file.         Patient information was obtained from patient, past medical records and ER records.     Subjective:     Principal Problem:Complete heart block    Chief Complaint: No chief complaint on file.       HPI: The patient is a 64 year old male with no known past medical history presents with 6 weeks of fatigue and worsening dyspnea on exertion.  He denied any presyncope, syncope, nausea, diaphoresis, chest pain.  No prior history of cardiac disease, no family history of cardiac disease.  The patient was bradycardic in the 30s and EKG showed complete heart block.  Had temporary transvenous pacer placed.  His EF is 35%, and EKG showing RBBB TWI in V2, qTC 520s-530s.  The patient is on Lasix 20mg IV daily.  The patient was transferred for Cardiology consult and further management of his third degree heart block.    No past medical history on file.    No past surgical history on file.    Review of patient's allergies indicates:  No Known Allergies    No current facility-administered medications on file prior to encounter.      No current outpatient medications on file prior to encounter.     Family History     Family history is unknown by patient.        Tobacco Use    Smoking status: Not on file   Substance and Sexual Activity    Alcohol use: Not on file    Drug use: Not on file    Sexual activity: Not on file     Review of Systems   Constitutional: Positive for activity change and fatigue. Negative for appetite change and fever.   HENT: Negative for congestion, ear pain and postnasal drip.    Eyes: Negative for discharge.   Respiratory: Positive for shortness of breath. Negative for apnea and wheezing.    Cardiovascular: Negative for chest pain and leg swelling.    Gastrointestinal: Negative for abdominal distention, abdominal pain, nausea and vomiting.   Endocrine: Negative for polydipsia, polyphagia and polyuria.   Genitourinary: Negative for difficulty urinating, flank pain, frequency, hematuria and urgency.   Musculoskeletal: Positive for myalgias. Negative for arthralgias and joint swelling.   Skin: Negative for pallor and rash.   Allergic/Immunologic: Negative for environmental allergies and food allergies.   Neurological: Negative for dizziness, speech difficulty, weakness, light-headedness and headaches.   Hematological: Does not bruise/bleed easily.   Psychiatric/Behavioral: Negative for agitation.     Objective:     Vital Signs (Most Recent):  Temp: 98.5 °F (36.9 °C) (11/06/20 2015)  Pulse: 60 (11/06/20 2300)  Resp: (!) 34 (11/06/20 2300)  BP: (!) 197/88 (11/06/20 2315)  SpO2: 98 % (11/06/20 2300) Vital Signs (24h Range):  Temp:  [98.5 °F (36.9 °C)] 98.5 °F (36.9 °C)  Pulse:  [60] 60  Resp:  [16-34] 34  SpO2:  [96 %-100 %] 98 %  BP: (148-198)/() 197/88     Weight: 98.4 kg (216 lb 14.9 oz)  Body mass index is 29.42 kg/m².    Physical Exam  Constitutional:       Appearance: Normal appearance. He is well-developed.   HENT:      Head: Normocephalic.   Eyes:      Conjunctiva/sclera: Conjunctivae normal.   Neck:      Musculoskeletal: Normal range of motion and neck supple.   Cardiovascular:      Rate and Rhythm: Normal rate.      Pulses:           Radial pulses are 2+ on the right side and 2+ on the left side.        Dorsalis pedis pulses are 2+ on the right side and 2+ on the left side.      Heart sounds: Normal heart sounds.      Comments: 100% paced  Pulmonary:      Effort: Pulmonary effort is normal. Tachypnea present.      Breath sounds: Normal breath sounds.   Abdominal:      General: Bowel sounds are increased. There is no distension.      Palpations: Abdomen is soft.      Tenderness: There is no abdominal tenderness.   Musculoskeletal: Normal range of  motion.   Skin:     General: Skin is warm and dry.      Capillary Refill: Capillary refill takes 2 to 3 seconds.   Neurological:      Mental Status: He is alert and oriented to person, place, and time.      GCS: GCS eye subscore is 4. GCS verbal subscore is 5. GCS motor subscore is 6.      Motor: Motor function is intact.   Psychiatric:         Mood and Affect: Mood normal.         Speech: Speech normal.         Behavior: Behavior normal.             Significant Labs:   CBC:   Recent Labs   Lab 11/06/20 2056   WBC 9.12   HGB 12.9*   HCT 41.9        CMP:   Recent Labs   Lab 11/06/20 2055      K 4.7      CO2 22*   *   BUN 15   CREATININE 1.0   CALCIUM 9.4   PROT 7.1   ALBUMIN 3.4*   BILITOT 0.9   ALKPHOS 73   AST 21   ALT 24   ANIONGAP 13   EGFRNONAA >60       Significant Imaging: I have reviewed all pertinent imaging results/findings within the past 24 hours.    Assessment/Plan:     * Complete heart block  Patient with transvenous pacer. 100% paced. Reports complete resolution of symptoms.    Consult Cardiology  Heparin drip  Cardiac monitoring          VTE Risk Mitigation (From admission, onward)         Ordered     heparin 25,000 units in dextrose 5% 250 mL (100 units/mL) infusion LOW INTENSITY nomogram - [facility]  Continuous     Question:  Heparin Infusion Adjustment (DO NOT MODIFY ANSWER)  Answer:  [email or web address]\Images\Pharmacy\HeparinInfusions\heparin LOW INTENSITY nomogram for [facility] [id number].pdf    11/06/20 2017     heparin 25,000 units in dextrose 5% (100 units/ml) IV bolus from bag - ADDITIONAL PRN BOLUS - 60 units/kg (max bolus 4000 units)  As needed (PRN)     Question:  Heparin Infusion Adjustment (DO NOT MODIFY ANSWER)  Answer:  [email or web address]\Images\Pharmacy\HeparinInfusions\heparin LOW INTENSITY nomogram for [facility] [id number].pdf    11/06/20 2017     heparin 25,000 units in dextrose 5% (100 units/ml) IV bolus from bag - ADDITIONAL PRN BOLUS - 30 units/kg (max bolus 4000 units)  As  needed (PRN)     Question:  Heparin Infusion Adjustment (DO NOT MODIFY ANSWER)  Answer:  \\[email or web address]\epic\Images\Pharmacy\HeparinInfusions\heparin LOW INTENSITY nomogram for [facility] [id number].pdf    11/06/20 2017     IP VTE HIGH RISK PATIENT  Once      11/06/20 2020     Reason for No Pharmacological VTE Prophylaxis  Once     Question:  Reasons:  Answer:  IV Heparin w/in 24 hrs. Pre or Post-Op    11/06/20 2020     Place DANILO hose  Until discontinued      11/06/20 2020     Place sequential compression device  Until discontinued      11/06/20 2020                   Dheeraj Lopez NP  Department of Hospital Medicine   [facility]

## 2020-11-07 NOTE — PROGRESS NOTES
Pt received to ICU bed 8 accompanied by flight RNs at 2000. Pt stable, NAD. Pt alert and oriented but requires frequent reminding to keep right leg straight. Pt expresses concerns about keeping leg straight when needing to void. Condom cath placed on pt.   2111 Dr. Hernandez called and notified of hypertension. New orders noted. Discussed temporary pacer settings. Orders for rate of 60 sensitivity of 1 and output of 3 in VVI mode.   2350  Pt found to sitting on side of bed, attempting to get up to use the bathroom, bed alarm ringing. Pt immediately reminded of importance of keeping leg straight and assisted back into bed. Pt very agitated at this time. Complaining that he cannot void while laying flat in bed. Order received for limon at this time. Limon catheter inserted using sterile technique. 725 ml of yellow urine immediately emptied into urimeter and limon bag. Pt much calmer after catheter insertion. Bed alarm reactivated, this time to middle sensitivity setting. Safety measures remain in place.

## 2020-11-07 NOTE — ASSESSMENT & PLAN NOTE
Patient with transvenous pacer. 100% paced. Reports complete resolution of symptoms.    Consult Cardiology  Heparin drip  Cardiac monitoring

## 2020-11-07 NOTE — HPI
"Per Dheeraj Lopez, NP:    "The patient is a 64 year old male with no known past medical history presents with 6 weeks of fatigue and worsening dyspnea on exertion.  He denied any presyncope, syncope, nausea, diaphoresis, chest pain.  No prior history of cardiac disease, no family history of cardiac disease.  The patient was bradycardic in the 30s and EKG showed complete heart block.  Had temporary transvenous pacer placed.  His EF is 35%, and EKG showing RBBB TWI in V2, qTC 520s-530s.  The patient is on Lasix 20mg IV daily.  The patient was transferred for Cardiology consult and further management of his third degree heart block."  "

## 2020-11-08 LAB
ALBUMIN SERPL BCP-MCNC: 3.2 G/DL (ref 3.5–5.2)
ALP SERPL-CCNC: 64 U/L (ref 55–135)
ALT SERPL W/O P-5'-P-CCNC: 26 U/L (ref 10–44)
ANION GAP SERPL CALC-SCNC: 9 MMOL/L (ref 8–16)
AST SERPL-CCNC: 23 U/L (ref 10–40)
BASOPHILS # BLD AUTO: 0.03 K/UL (ref 0–0.2)
BASOPHILS NFR BLD: 0.3 % (ref 0–1.9)
BILIRUB SERPL-MCNC: 0.8 MG/DL (ref 0.1–1)
BUN SERPL-MCNC: 23 MG/DL (ref 8–23)
CALCIUM SERPL-MCNC: 9.6 MG/DL (ref 8.7–10.5)
CHLORIDE SERPL-SCNC: 104 MMOL/L (ref 95–110)
CHOLEST SERPL-MCNC: 122 MG/DL (ref 120–199)
CHOLEST/HDLC SERPL: 4.9 {RATIO} (ref 2–5)
CO2 SERPL-SCNC: 21 MMOL/L (ref 23–29)
CREAT SERPL-MCNC: 1 MG/DL (ref 0.5–1.4)
DIFFERENTIAL METHOD: ABNORMAL
EOSINOPHIL # BLD AUTO: 0.2 K/UL (ref 0–0.5)
EOSINOPHIL NFR BLD: 2.1 % (ref 0–8)
ERYTHROCYTE [DISTWIDTH] IN BLOOD BY AUTOMATED COUNT: 18.2 % (ref 11.5–14.5)
EST. GFR  (AFRICAN AMERICAN): >60 ML/MIN/1.73 M^2
EST. GFR  (NON AFRICAN AMERICAN): >60 ML/MIN/1.73 M^2
ESTIMATED AVG GLUCOSE: 174 MG/DL (ref 68–131)
GLUCOSE SERPL-MCNC: 166 MG/DL (ref 70–110)
HBA1C MFR BLD HPLC: 7.7 % (ref 4–5.6)
HCT VFR BLD AUTO: 40.9 % (ref 40–54)
HDLC SERPL-MCNC: 25 MG/DL (ref 40–75)
HDLC SERPL: 20.5 % (ref 20–50)
HGB BLD-MCNC: 13 G/DL (ref 14–18)
IMM GRANULOCYTES # BLD AUTO: 0.03 K/UL (ref 0–0.04)
IMM GRANULOCYTES NFR BLD AUTO: 0.3 % (ref 0–0.5)
LDLC SERPL CALC-MCNC: 72.8 MG/DL (ref 63–159)
LYMPHOCYTES # BLD AUTO: 2 K/UL (ref 1–4.8)
LYMPHOCYTES NFR BLD: 22.9 % (ref 18–48)
MAGNESIUM SERPL-MCNC: 2.1 MG/DL (ref 1.6–2.6)
MCH RBC QN AUTO: 29.7 PG (ref 27–31)
MCHC RBC AUTO-ENTMCNC: 31.8 G/DL (ref 32–36)
MCV RBC AUTO: 94 FL (ref 82–98)
MONOCYTES # BLD AUTO: 0.8 K/UL (ref 0.3–1)
MONOCYTES NFR BLD: 8.9 % (ref 4–15)
NEUTROPHILS # BLD AUTO: 5.7 K/UL (ref 1.8–7.7)
NEUTROPHILS NFR BLD: 65.5 % (ref 38–73)
NONHDLC SERPL-MCNC: 97 MG/DL
NRBC BLD-RTO: 0 /100 WBC
PHOSPHATE SERPL-MCNC: 5.2 MG/DL (ref 2.7–4.5)
PLATELET # BLD AUTO: 237 K/UL (ref 150–350)
PMV BLD AUTO: 10.3 FL (ref 9.2–12.9)
POTASSIUM SERPL-SCNC: 4.3 MMOL/L (ref 3.5–5.1)
PROT SERPL-MCNC: 6.7 G/DL (ref 6–8.4)
RBC # BLD AUTO: 4.37 M/UL (ref 4.6–6.2)
SODIUM SERPL-SCNC: 134 MMOL/L (ref 136–145)
TRIGL SERPL-MCNC: 121 MG/DL (ref 30–150)
WBC # BLD AUTO: 8.68 K/UL (ref 3.9–12.7)

## 2020-11-08 PROCEDURE — 99233 SBSQ HOSP IP/OBS HIGH 50: CPT | Mod: ,,, | Performed by: INTERNAL MEDICINE

## 2020-11-08 PROCEDURE — 25000003 PHARM REV CODE 250: Performed by: NURSE PRACTITIONER

## 2020-11-08 PROCEDURE — 83036 HEMOGLOBIN GLYCOSYLATED A1C: CPT

## 2020-11-08 PROCEDURE — 99233 SBSQ HOSP IP/OBS HIGH 50: CPT | Mod: ,,, | Performed by: HOSPITALIST

## 2020-11-08 PROCEDURE — 99233 PR SUBSEQUENT HOSPITAL CARE,LEVL III: ICD-10-PCS | Mod: ,,, | Performed by: INTERNAL MEDICINE

## 2020-11-08 PROCEDURE — 85025 COMPLETE CBC W/AUTO DIFF WBC: CPT

## 2020-11-08 PROCEDURE — 63600175 PHARM REV CODE 636 W HCPCS: Performed by: INTERNAL MEDICINE

## 2020-11-08 PROCEDURE — 80053 COMPREHEN METABOLIC PANEL: CPT

## 2020-11-08 PROCEDURE — 36415 COLL VENOUS BLD VENIPUNCTURE: CPT

## 2020-11-08 PROCEDURE — 83735 ASSAY OF MAGNESIUM: CPT

## 2020-11-08 PROCEDURE — 25000003 PHARM REV CODE 250: Performed by: HOSPITALIST

## 2020-11-08 PROCEDURE — 25000003 PHARM REV CODE 250: Performed by: INTERNAL MEDICINE

## 2020-11-08 PROCEDURE — 20000000 HC ICU ROOM

## 2020-11-08 PROCEDURE — 99233 PR SUBSEQUENT HOSPITAL CARE,LEVL III: ICD-10-PCS | Mod: ,,, | Performed by: HOSPITALIST

## 2020-11-08 PROCEDURE — 80061 LIPID PANEL: CPT

## 2020-11-08 PROCEDURE — 84100 ASSAY OF PHOSPHORUS: CPT

## 2020-11-08 RX ADMIN — HEPARIN SODIUM 5000 UNITS: 5000 INJECTION INTRAVENOUS; SUBCUTANEOUS at 05:11

## 2020-11-08 RX ADMIN — NIFEDIPINE 30 MG: 30 TABLET, FILM COATED, EXTENDED RELEASE ORAL at 08:11

## 2020-11-08 RX ADMIN — ATORVASTATIN CALCIUM 80 MG: 20 TABLET, FILM COATED ORAL at 08:11

## 2020-11-08 RX ADMIN — LOSARTAN POTASSIUM 50 MG: 50 TABLET, FILM COATED ORAL at 09:11

## 2020-11-08 RX ADMIN — MUPIROCIN: 20 OINTMENT TOPICAL at 09:11

## 2020-11-08 RX ADMIN — FUROSEMIDE 40 MG: 40 TABLET ORAL at 08:11

## 2020-11-08 RX ADMIN — ASPIRIN 81 MG: 81 TABLET, COATED ORAL at 08:11

## 2020-11-08 RX ADMIN — HEPARIN SODIUM 5000 UNITS: 5000 INJECTION INTRAVENOUS; SUBCUTANEOUS at 09:11

## 2020-11-08 RX ADMIN — MUPIROCIN: 20 OINTMENT TOPICAL at 08:11

## 2020-11-08 RX ADMIN — LOSARTAN POTASSIUM 50 MG: 50 TABLET, FILM COATED ORAL at 08:11

## 2020-11-08 RX ADMIN — HEPARIN SODIUM 5000 UNITS: 5000 INJECTION INTRAVENOUS; SUBCUTANEOUS at 01:11

## 2020-11-08 RX ADMIN — ALPRAZOLAM 0.25 MG: 0.25 TABLET ORAL at 09:11

## 2020-11-08 NOTE — SUBJECTIVE & OBJECTIVE
Interval History:  No acute events overnight. Comfortable at rest.    Review of Systems   Constitutional: Negative for chills and fever.   Respiratory: Negative for shortness of breath.    Cardiovascular: Negative for chest pain.   Gastrointestinal: Negative for abdominal pain, constipation, diarrhea, nausea and vomiting.     Objective:     Vital Signs (Most Recent):  Temp: 97.9 °F (36.6 °C) (11/07/20 1515)  Pulse: 60 (11/07/20 1800)  Resp: 11 (11/07/20 1800)  BP: 139/85 (11/07/20 1800)  SpO2: 98 % (11/07/20 1800) Vital Signs (24h Range):  Temp:  [97.5 °F (36.4 °C)-98.5 °F (36.9 °C)] 97.9 °F (36.6 °C)  Pulse:  [58-60] 60  Resp:  [11-34] 11  SpO2:  [92 %-100 %] 98 %  BP: (138-198)/() 139/85     Weight: 98.4 kg (216 lb 14.9 oz)  Body mass index is 29.42 kg/m².    Intake/Output Summary (Last 24 hours) at 11/7/2020 1839  Last data filed at 11/7/2020 1824  Gross per 24 hour   Intake 105.63 ml   Output 1885 ml   Net -1779.37 ml      Physical Exam  Cardiovascular:      Rate and Rhythm: Normal rate and regular rhythm.      Heart sounds: Normal heart sounds. No murmur. No friction rub. No gallop.       Comments: Temporary transvenous pacemaker and place through the right groin.  Pulmonary:      Effort: Pulmonary effort is normal. No respiratory distress.      Breath sounds: Normal breath sounds. No wheezing or rales.   Abdominal:      General: Bowel sounds are normal. There is no distension.      Palpations: Abdomen is soft.      Tenderness: There is no abdominal tenderness.   Musculoskeletal: Normal range of motion.         General: No tenderness.   Skin:     General: Skin is warm and dry.      Coloration: Skin is not pale.      Findings: No erythema or rash.   Neurological:      Mental Status: He is alert and oriented to person, place, and time.         Significant Labs: All pertinent labs within the past 24 hours have been reviewed.    Significant Imaging: I have reviewed all pertinent imaging results/findings  within the past 24 hours.

## 2020-11-08 NOTE — SUBJECTIVE & OBJECTIVE
Interval History:  No acute events overnight. Comfortable at rest.    Review of Systems   Constitutional: Negative for chills and fever.   Respiratory: Negative for shortness of breath.    Cardiovascular: Negative for chest pain.   Gastrointestinal: Negative for abdominal pain, constipation, diarrhea, nausea and vomiting.     Objective:     Vital Signs (Most Recent):  Temp: 98.8 °F (37.1 °C) (11/08/20 1500)  Pulse: 60 (11/08/20 1700)  Resp: 20 (11/08/20 1700)  BP: (!) 127/53 (11/08/20 1700)  SpO2: (!) 94 % (11/08/20 1700) Vital Signs (24h Range):  Temp:  [98 °F (36.7 °C)-98.8 °F (37.1 °C)] 98.8 °F (37.1 °C)  Pulse:  [58-60] 60  Resp:  [11-44] 20  SpO2:  [92 %-99 %] 94 %  BP: ()/(50-89) 127/53     Weight: 98.6 kg (217 lb 6 oz)  Body mass index is 29.48 kg/m².    Intake/Output Summary (Last 24 hours) at 11/8/2020 1743  Last data filed at 11/8/2020 1700  Gross per 24 hour   Intake 600 ml   Output 2650 ml   Net -2050 ml      Physical Exam  Cardiovascular:      Rate and Rhythm: Normal rate and regular rhythm.      Heart sounds: Normal heart sounds. No murmur. No friction rub. No gallop.       Comments: Temporary transvenous pacemaker and place through the right groin.  Pulmonary:      Effort: Pulmonary effort is normal. No respiratory distress.      Breath sounds: Normal breath sounds. No wheezing or rales.   Abdominal:      General: Bowel sounds are normal. There is no distension.      Palpations: Abdomen is soft.      Tenderness: There is no abdominal tenderness.   Musculoskeletal: Normal range of motion.         General: No tenderness.   Skin:     General: Skin is warm and dry.      Coloration: Skin is not pale.      Findings: No erythema or rash.   Neurological:      Mental Status: He is alert and oriented to person, place, and time.         Significant Labs: All pertinent labs within the past 24 hours have been reviewed.    Significant Imaging: I have reviewed all pertinent imaging results/findings within the  past 24 hours.

## 2020-11-08 NOTE — ASSESSMENT & PLAN NOTE
Patient with good capture with temporary transvenous pacemaker.  Patient is symptomatic with ventricular escape rhythm about 30 beats per minute.  Probably will need pacemaker.  Cardiology consulted.  Plan for left heart catheterization and permanent pacemaker likely both tomorrow.

## 2020-11-08 NOTE — PLAN OF CARE
Patient resting in bed. Transcutaneous pacer in place. Patient's heart rate remains captured at 59. Patient aware of POC; cath lab scheduled for Monday. No distress, will continue to monitor.

## 2020-11-08 NOTE — ASSESSMENT & PLAN NOTE
Reasonably controlled with current regimen.  Discontinue p.r.n. clonidine which can exacerbate his heart block.  Will continue with current regimen and continue to monitor.

## 2020-11-08 NOTE — ASSESSMENT & PLAN NOTE
Patient with good capture with temporary transvenous pacemaker.  Patient is symptomatic with ventricular escape rhythm about 30 beats per minute.  Probably will need pacemaker.  Cardiology consulted.

## 2020-11-08 NOTE — PROGRESS NOTES
"[facility] Hospital Medicine  Progress Note    Patient Name: Darren Patel  MRN: [id number]  Patient Class: IP- Inpatient   Admission Date: 11/6/2020  Length of Stay: 2 days  Attending Physician: Dung Mercedes MD  Primary Care Provider: No primary care provider on file.        Subjective:     Principal Problem:Complete heart block        HPI:  Per Dheeraj Lopez, NP:    "The patient is a 64 year old male with no known past medical history presents with 6 weeks of fatigue and worsening dyspnea on exertion.  He denied any presyncope, syncope, nausea, diaphoresis, chest pain.  No prior history of cardiac disease, no family history of cardiac disease.  The patient was bradycardic in the 30s and EKG showed complete heart block.  Had temporary transvenous pacer placed.  His EF is 35%, and EKG showing RBBB TWI in V2, qTC 520s-530s.  The patient is on Lasix 20mg IV daily.  The patient was transferred for Cardiology consult and further management of his third degree heart block."    Overview/Hospital Course:  Patient is a 65-year-old man with history of atrial fibrillation not on medical therapy who presented to the emergency department in [place], [place] with progressively worsening dyspnea on exertion.  Patient found to to be in complete heart block.  Temporary transvenous pacemaker placed.  Patient also started on treatment for possible acute coronary syndrome.  Patient transferred to [facility] for definitive treatment due to lack of capacity in [place].    Interval History:  No acute events overnight. Comfortable at rest.    Review of Systems   Constitutional: Negative for chills and fever.   Respiratory: Negative for shortness of breath.    Cardiovascular: Negative for chest pain.   Gastrointestinal: Negative for abdominal pain, constipation, diarrhea, nausea and vomiting.     Objective:     Vital Signs (Most Recent):  Temp: 98.8 °F (37.1 °C) (11/08/20 1500)  Pulse: 60 (11/08/20 " 1700)  Resp: 20 (11/08/20 1700)  BP: (!) 127/53 (11/08/20 1700)  SpO2: (!) 94 % (11/08/20 1700) Vital Signs (24h Range):  Temp:  [98 °F (36.7 °C)-98.8 °F (37.1 °C)] 98.8 °F (37.1 °C)  Pulse:  [58-60] 60  Resp:  [11-44] 20  SpO2:  [92 %-99 %] 94 %  BP: ()/(50-89) 127/53     Weight: 98.6 kg (217 lb 6 oz)  Body mass index is 29.48 kg/m².    Intake/Output Summary (Last 24 hours) at 11/8/2020 1743  Last data filed at 11/8/2020 1700  Gross per 24 hour   Intake 600 ml   Output 2650 ml   Net -2050 ml      Physical Exam  Cardiovascular:      Rate and Rhythm: Normal rate and regular rhythm.      Heart sounds: Normal heart sounds. No murmur. No friction rub. No gallop.       Comments: Temporary transvenous pacemaker and place through the right groin.  Pulmonary:      Effort: Pulmonary effort is normal. No respiratory distress.      Breath sounds: Normal breath sounds. No wheezing or rales.   Abdominal:      General: Bowel sounds are normal. There is no distension.      Palpations: Abdomen is soft.      Tenderness: There is no abdominal tenderness.   Musculoskeletal: Normal range of motion.         General: No tenderness.   Skin:     General: Skin is warm and dry.      Coloration: Skin is not pale.      Findings: No erythema or rash.   Neurological:      Mental Status: He is alert and oriented to person, place, and time.         Significant Labs: All pertinent labs within the past 24 hours have been reviewed.    Significant Imaging: I have reviewed all pertinent imaging results/findings within the past 24 hours.      Assessment/Plan:      * Complete heart block  Patient with good capture with temporary transvenous pacemaker.  Patient is symptomatic with ventricular escape rhythm about 30 beats per minute.  Probably will need pacemaker.  Cardiology consulted.  Plan for left heart catheterization and permanent pacemaker likely both tomorrow.    Essential hypertension  Reasonably controlled with current regimen.      VTE  Risk Mitigation (From admission, onward)         Ordered     heparin (porcine) injection 5,000 Units  Every 8 hours      11/07/20 0855     IP VTE HIGH RISK PATIENT  Once      11/06/20 2020     Reason for No Pharmacological VTE Prophylaxis  Once     Question:  Reasons:  Answer:  IV Heparin w/in 24 hrs. Pre or Post-Op    11/06/20 2020     Place DANILO hose  Until discontinued      11/06/20 2020     Place sequential compression device  Until discontinued      11/06/20 2020                Dung Mercedes MD  Department of Hospital Medicine   [facility]

## 2020-11-08 NOTE — HOSPITAL COURSE
Patient is a 65-year-old man with history of atrial fibrillation not on medical therapy who presented to the emergency department in [place], [place] with progressively worsening dyspnea on exertion.  Patient found to to be in complete heart block.  Temporary transvenous pacemaker placed.  Patient also started on treatment for possible acute coronary syndrome.  Patient transferred to [facility] for definitive treatment due to lack of capacity in [place].    Patient underwent coronary angiogram today which revealed significant 2 vessel disease.  Medical therapy advised for now.  Patient underwent permanent pacemaker later this evening.  See below.

## 2020-11-08 NOTE — PROGRESS NOTES
[facility]    Admission date:  11/6/2020     Assessment  1.  Complete heart block  This morning's rhythm is sinus with 2:1 av block.  This is infranodal disease.     2.  Acute coronary syndrome?  Therapy deescalated and remains stable     3.  Hypertension  Controlled    Plan and Discussion  Coronary angiography 1st thing in the morning.  Will give him a few hours so anticoagulation can wear off before proceeding with pacemaker implant later in the day. The procedure, its risks, benefits and alternatives were discussed in detail.  All questions were answered.  Appropriate consents were signed.    Subjective  No problems overnight    Medications  Current Facility-Administered Medications   Medication Dose Route Frequency Provider Last Rate Last Dose    acetaminophen tablet 1,000 mg  1,000 mg Oral Q8H PRN Dung Mercedes MD        ALPRAZolam tablet 0.25 mg  0.25 mg Oral TID PRN Dheeraj Lopez NP   0.25 mg at 11/07/20 2049    aspirin EC tablet 81 mg  81 mg Oral Daily Nolan Hernandez MD   81 mg at 11/08/20 0818    atorvastatin tablet 80 mg  80 mg Oral Daily Dheeraj Lopez NP   80 mg at 11/08/20 0818    furosemide tablet 40 mg  40 mg Oral Daily Dung Mercedes MD   40 mg at 11/08/20 0818    heparin (porcine) injection 5,000 Units  5,000 Units Subcutaneous Q8H Nolan Hernandez MD   5,000 Units at 11/08/20 0548    influenza (QUADRIVALENT ADJUVANTED PF) vaccine 0.5 mL  0.5 mL Intramuscular vaccine x 1 dose Dung Mercedes MD        losartan tablet 50 mg  50 mg Oral Q12H Nolan Hernandez MD   50 mg at 11/08/20 0818    mupirocin 2 % ointment   Nasal BID Dung Mercedes MD        NIFEdipine 24 hr tablet 30 mg  30 mg Oral Daily Nolan Hernandez MD   30 mg at 11/08/20 0818    ondansetron injection 8 mg  8 mg Intravenous Q8H PRN Dung Mercedes MD        pneumoc 13-jermaine conj-dip cr(PF) (PREVNAR 13 (PF)) 0.5 mL  0.5 mL Intramuscular vaccine x 1 dose Dung Mercedes MD        sodium  chloride 0.9% flush 10 mL  10 mL Intravenous PRN Dheerajnatali Lopez, BRET        traMADoL tablet 50 mg  50 mg Oral Q6H PRN Dheeraj HOYOSWilly John, BRET   50 mg at 11/07/20 1105        Physical Exam  Temp:  [97.6 °F (36.4 °C)-98.4 °F (36.9 °C)]   Pulse:  [58-60]   Resp:  [11-44]   BP: (116-161)/(60-89)   SpO2:  [92 %-100 %]    Wt Readings from Last 3 Encounters:   11/08/20 98.6 kg (217 lb 6 oz)   11/07/20 98 kg (216 lb)   11/06/20 103.3 kg (227 lb 11.8 oz)     Physical Exam   Constitutional: He is oriented to person, place, and time. He is cooperative.  Non-toxic appearance. No distress.   HENT:   Head: Normocephalic and atraumatic.   Neck: No hepatojugular reflux and no JVD present. Carotid bruit is not present.   Cardiovascular: Normal rate, regular rhythm, S1 normal and S2 normal.  No extrasystoles are present. PMI is not displaced. Exam reveals no S3 and no S4.   No murmur heard.  Pulses:       Carotid pulses are 2+ on the right side and 2+ on the left side.       Radial pulses are 2+ on the right side and 2+ on the left side.   There is a 5 Swedish sheath in the right femoral vein through which the temporary pacemaker is inserted.  Ángel and Barbeau test suggest patent palmar arch.   Pulmonary/Chest: No accessory muscle usage. No respiratory distress. He has no decreased breath sounds. He has no wheezes. He has no rhonchi. He has no rales.   Abdominal: Soft. Bowel sounds are normal. He exhibits no pulsatile liver, no abdominal bruit and no pulsatile midline mass. There is no splenomegaly or hepatomegaly. There is no abdominal tenderness.   Musculoskeletal:         General: No edema.   Neurological: He is alert and oriented to person, place, and time.   Skin: Skin is warm, dry and intact. He is not diaphoretic. No cyanosis. No pallor. Nails show no clubbing.   Psychiatric: He has a normal mood and affect. His speech is normal and behavior is normal.       Telemetry  Ventricular pacing.  When pacemaker rate is turned down  he has sinus rhythm with 2:1 av block.    Labs  Recent Results (from the past 24 hour(s))   Echo Color Flow Doppler? Yes    Collection Time: 11/07/20 10:18 AM   Result Value Ref Range    BSA 2.23 m2    LA WIDTH 3.95 cm    PV PEAK VELOCITY 0.90 cm/s    LVIDd 3.40 (A) 3.5 - 6.0 cm    IVS 1.35 (A) 0.6 - 1.1 cm    Posterior Wall 1.31 (A) 0.6 - 1.1 cm    LVIDs 3.08 2.1 - 4.0 cm    FS 9 28 - 44 %    LA volume 86.42 cm3    Sinus 3.49 cm    STJ 2.82 cm    LV mass 153.06 g    LA size 4.23 cm    Left Ventricle Relative Wall Thickness 0.77 cm    AV mean gradient 6 mmHg    AV valve area 1.65 cm2    AV Velocity Ratio 0.53     AV index (prosthetic) 0.47     MV valve area p 1/2 method 2.96 cm2    E/A ratio 1.22     E wave decelartion time 256.71 msec    LVOT diameter 2.12 cm    LVOT area 3.5 cm2    LVOT peak mateo 0.94 m/s    LVOT peak VTI 13.14 cm    Ao peak mateo 1.76 m/s    Ao VTI 28.18 cm    LVOT stroke volume 46.36 cm3    AV peak gradient 12 mmHg    MV Peak E Mateo 0.56 m/s    TR Max Mateo 2.20 m/s    MV stenosis pressure 1/2 time 74.45 ms    MV Peak A Mateo 0.46 m/s    LV Systolic Volume 37.38 mL    LV Systolic Volume Index 17.0 mL/m2    LV Diastolic Volume 47.46 mL    LV Diastolic Volume Index 21.56 mL/m2    LA Volume Index 39.3 mL/m2    LV Mass Index 70 g/m2    RA Major Axis 5.49 cm    Left Atrium Minor Axis 6.12 cm    Left Atrium Major Axis 6.05 cm    Triscuspid Valve Regurgitation Peak Gradient 19 mmHg    RA Width 4.08 cm   Comprehensive Metabolic Panel (CMP)    Collection Time: 11/08/20  3:39 AM   Result Value Ref Range    Sodium 134 (L) 136 - 145 mmol/L    Potassium 4.3 3.5 - 5.1 mmol/L    Chloride 104 95 - 110 mmol/L    CO2 21 (L) 23 - 29 mmol/L    Glucose 166 (H) 70 - 110 mg/dL    BUN 23 8 - 23 mg/dL    Creatinine 1.0 0.5 - 1.4 mg/dL    Calcium 9.6 8.7 - 10.5 mg/dL    Total Protein 6.7 6.0 - 8.4 g/dL    Albumin 3.2 (L) 3.5 - 5.2 g/dL    Total Bilirubin 0.8 0.1 - 1.0 mg/dL    Alkaline Phosphatase 64 55 - 135 U/L    AST 23 10 -  40 U/L    ALT 26 10 - 44 U/L    Anion Gap 9 8 - 16 mmol/L    eGFR if African American >60 >60 mL/min/1.73 m^2    eGFR if non African American >60 >60 mL/min/1.73 m^2   Magnesium    Collection Time: 11/08/20  3:39 AM   Result Value Ref Range    Magnesium 2.1 1.6 - 2.6 mg/dL   Phosphorus    Collection Time: 11/08/20  3:39 AM   Result Value Ref Range    Phosphorus 5.2 (H) 2.7 - 4.5 mg/dL   CBC with Automated Differential    Collection Time: 11/08/20  3:39 AM   Result Value Ref Range    WBC 8.68 3.90 - 12.70 K/uL    RBC 4.37 (L) 4.60 - 6.20 M/uL    Hemoglobin 13.0 (L) 14.0 - 18.0 g/dL    Hematocrit 40.9 40.0 - 54.0 %    MCV 94 82 - 98 fL    MCH 29.7 27.0 - 31.0 pg    MCHC 31.8 (L) 32.0 - 36.0 g/dL    RDW 18.2 (H) 11.5 - 14.5 %    Platelets 237 150 - 350 K/uL    MPV 10.3 9.2 - 12.9 fL    Immature Granulocytes 0.3 0.0 - 0.5 %    Gran # (ANC) 5.7 1.8 - 7.7 K/uL    Immature Grans (Abs) 0.03 0.00 - 0.04 K/uL    Lymph # 2.0 1.0 - 4.8 K/uL    Mono # 0.8 0.3 - 1.0 K/uL    Eos # 0.2 0.0 - 0.5 K/uL    Baso # 0.03 0.00 - 0.20 K/uL    nRBC 0 0 /100 WBC    Gran % 65.5 38.0 - 73.0 %    Lymph % 22.9 18.0 - 48.0 %    Mono % 8.9 4.0 - 15.0 %    Eosinophil % 2.1 0.0 - 8.0 %    Basophil % 0.3 0.0 - 1.9 %    Differential Method Automated    Lipid Panel    Collection Time: 11/08/20  3:39 AM   Result Value Ref Range    Cholesterol 122 120 - 199 mg/dL    Triglycerides 121 30 - 150 mg/dL    HDL 25 (L) 40 - 75 mg/dL    LDL Cholesterol 72.8 63.0 - 159.0 mg/dL    HDL/Cholesterol Ratio 20.5 20.0 - 50.0 %    Total Cholesterol/HDL Ratio 4.9 2.0 - 5.0    Non-HDL Cholesterol 97 mg/dL   Hemoglobin A1c    Collection Time: 11/08/20  3:39 AM   Result Value Ref Range    Hemoglobin A1C 7.7 (H) 4.0 - 5.6 %    Estimated Avg Glucose 174 (H) 68 - 131 mg/dL        Imaging  EXAMINATION:  XR CHEST AP PORTABLE     CLINICAL HISTORY:  temp pacer;     TECHNIQUE:  Single frontal view of the chest was performed.     COMPARISON:  None     FINDINGS:  Heart size is mildly  enlarged.  There is calcification along the wall of the aorta.  There is no pleural effusion.  The osseous structures are unremarkable.  Lung fields are clear.     Impression:     As above        Electronically signed by: Indira Sandoval MD  Date:                                            11/07/2020  Time:                                           13:57    Pacemaker lead difficult to see but can be seen in the right atrium heading into the right ventricle.       Nolan Hernandez MD

## 2020-11-08 NOTE — PLAN OF CARE
Pt received resting in bed. Pt AAO x4, NAD. Pt denies any complaints. Transvenous pacer remains in place. Pt remains paced at 59 bpm. Vital signs remain stable. Pt has slept better this night than the previous night. Pt more compliant with keeping right leg straight and remaining in bed. POC discussed with pt and pt in agreement. Safety measures remain in place.

## 2020-11-08 NOTE — PROGRESS NOTES
"[facility] Hospital Medicine  Progress Note    Patient Name: Darren Patel  MRN: [id number]  Patient Class: IP- Inpatient   Admission Date: 11/6/2020  Length of Stay: 1 days  Attending Physician: Dung Mercedes MD  Primary Care Provider: No primary care provider on file.        Subjective:     Principal Problem:Complete heart block        HPI:  Per Dheeraj Lopez, NP:    "The patient is a 64 year old male with no known past medical history presents with 6 weeks of fatigue and worsening dyspnea on exertion.  He denied any presyncope, syncope, nausea, diaphoresis, chest pain.  No prior history of cardiac disease, no family history of cardiac disease.  The patient was bradycardic in the 30s and EKG showed complete heart block.  Had temporary transvenous pacer placed.  His EF is 35%, and EKG showing RBBB TWI in V2, qTC 520s-530s.  The patient is on Lasix 20mg IV daily.  The patient was transferred for Cardiology consult and further management of his third degree heart block."    Overview/Hospital Course:  Patient is a 65-year-old man with history of atrial fibrillation not on medical therapy who presented to the emergency department in [place], [place] with progressively worsening dyspnea on exertion.  Patient found to to be in complete heart block.  Temporary transvenous pacemaker placed.  Patient also started on treatment for possible acute coronary syndrome.  Patient transferred to [facility] for definitive treatment due to lack of capacity in [place].    Interval History:  No acute events overnight. Comfortable at rest.    Review of Systems   Constitutional: Negative for chills and fever.   Respiratory: Negative for shortness of breath.    Cardiovascular: Negative for chest pain.   Gastrointestinal: Negative for abdominal pain, constipation, diarrhea, nausea and vomiting.     Objective:     Vital Signs (Most Recent):  Temp: 97.9 °F (36.6 °C) (11/07/20 1515)  Pulse: 60 (11/07/20 " 1800)  Resp: 11 (11/07/20 1800)  BP: 139/85 (11/07/20 1800)  SpO2: 98 % (11/07/20 1800) Vital Signs (24h Range):  Temp:  [97.5 °F (36.4 °C)-98.5 °F (36.9 °C)] 97.9 °F (36.6 °C)  Pulse:  [58-60] 60  Resp:  [11-34] 11  SpO2:  [92 %-100 %] 98 %  BP: (138-198)/() 139/85     Weight: 98.4 kg (216 lb 14.9 oz)  Body mass index is 29.42 kg/m².    Intake/Output Summary (Last 24 hours) at 11/7/2020 1839  Last data filed at 11/7/2020 1824  Gross per 24 hour   Intake 105.63 ml   Output 1885 ml   Net -1779.37 ml      Physical Exam  Cardiovascular:      Rate and Rhythm: Normal rate and regular rhythm.      Heart sounds: Normal heart sounds. No murmur. No friction rub. No gallop.       Comments: Temporary transvenous pacemaker and place through the right groin.  Pulmonary:      Effort: Pulmonary effort is normal. No respiratory distress.      Breath sounds: Normal breath sounds. No wheezing or rales.   Abdominal:      General: Bowel sounds are normal. There is no distension.      Palpations: Abdomen is soft.      Tenderness: There is no abdominal tenderness.   Musculoskeletal: Normal range of motion.         General: No tenderness.   Skin:     General: Skin is warm and dry.      Coloration: Skin is not pale.      Findings: No erythema or rash.   Neurological:      Mental Status: He is alert and oriented to person, place, and time.         Significant Labs: All pertinent labs within the past 24 hours have been reviewed.    Significant Imaging: I have reviewed all pertinent imaging results/findings within the past 24 hours.      Assessment/Plan:      * Complete heart block  Patient with good capture with temporary transvenous pacemaker.  Patient is symptomatic with ventricular escape rhythm about 30 beats per minute.  Probably will need pacemaker.  Cardiology consulted.    Essential hypertension  Reasonably controlled with current regimen.  Discontinue p.r.n. clonidine which can exacerbate his heart block.  Will continue with  current regimen and continue to monitor.        VTE Risk Mitigation (From admission, onward)         Ordered     heparin (porcine) injection 5,000 Units  Every 8 hours      11/07/20 0855     IP VTE HIGH RISK PATIENT  Once      11/06/20 2020     Reason for No Pharmacological VTE Prophylaxis  Once     Question:  Reasons:  Answer:  IV Heparin w/in 24 hrs. Pre or Post-Op    11/06/20 2020     Place DANILO hose  Until discontinued      11/06/20 2020     Place sequential compression device  Until discontinued      11/06/20 2020                Dung Mercedes MD  Department of Hospital Medicine   [facility]

## 2020-11-09 PROBLEM — I25.10 CORONARY ARTERY DISEASE INVOLVING NATIVE CORONARY ARTERY OF NATIVE HEART WITHOUT ANGINA PECTORIS: Status: ACTIVE | Noted: 2020-11-09

## 2020-11-09 LAB
ALBUMIN SERPL BCP-MCNC: 3.4 G/DL (ref 3.5–5.2)
ALP SERPL-CCNC: 76 U/L (ref 55–135)
ALT SERPL W/O P-5'-P-CCNC: 35 U/L (ref 10–44)
ANION GAP SERPL CALC-SCNC: 15 MMOL/L (ref 8–16)
AST SERPL-CCNC: 33 U/L (ref 10–40)
BASOPHILS # BLD AUTO: 0.03 K/UL (ref 0–0.2)
BASOPHILS NFR BLD: 0.4 % (ref 0–1.9)
BILIRUB SERPL-MCNC: 1 MG/DL (ref 0.1–1)
BUN SERPL-MCNC: 21 MG/DL (ref 8–23)
CALCIUM SERPL-MCNC: 9.7 MG/DL (ref 8.7–10.5)
CHLORIDE SERPL-SCNC: 101 MMOL/L (ref 95–110)
CO2 SERPL-SCNC: 20 MMOL/L (ref 23–29)
CREAT SERPL-MCNC: 1 MG/DL (ref 0.5–1.4)
DIFFERENTIAL METHOD: ABNORMAL
EOSINOPHIL # BLD AUTO: 0.2 K/UL (ref 0–0.5)
EOSINOPHIL NFR BLD: 2 % (ref 0–8)
ERYTHROCYTE [DISTWIDTH] IN BLOOD BY AUTOMATED COUNT: 17.8 % (ref 11.5–14.5)
EST. GFR  (AFRICAN AMERICAN): >60 ML/MIN/1.73 M^2
EST. GFR  (NON AFRICAN AMERICAN): >60 ML/MIN/1.73 M^2
GLUCOSE SERPL-MCNC: 152 MG/DL (ref 70–110)
HCT VFR BLD AUTO: 43 % (ref 40–54)
HGB BLD-MCNC: 13.8 G/DL (ref 14–18)
IMM GRANULOCYTES # BLD AUTO: 0.04 K/UL (ref 0–0.04)
IMM GRANULOCYTES NFR BLD AUTO: 0.5 % (ref 0–0.5)
LYMPHOCYTES # BLD AUTO: 2.3 K/UL (ref 1–4.8)
LYMPHOCYTES NFR BLD: 27.7 % (ref 18–48)
MAGNESIUM SERPL-MCNC: 2.1 MG/DL (ref 1.6–2.6)
MCH RBC QN AUTO: 29.6 PG (ref 27–31)
MCHC RBC AUTO-ENTMCNC: 32.1 G/DL (ref 32–36)
MCV RBC AUTO: 92 FL (ref 82–98)
MONOCYTES # BLD AUTO: 0.8 K/UL (ref 0.3–1)
MONOCYTES NFR BLD: 9.5 % (ref 4–15)
NEUTROPHILS # BLD AUTO: 4.9 K/UL (ref 1.8–7.7)
NEUTROPHILS NFR BLD: 59.9 % (ref 38–73)
NRBC BLD-RTO: 0 /100 WBC
PHOSPHATE SERPL-MCNC: 4.5 MG/DL (ref 2.7–4.5)
PLATELET # BLD AUTO: 242 K/UL (ref 150–350)
PMV BLD AUTO: 10.5 FL (ref 9.2–12.9)
POTASSIUM SERPL-SCNC: 4.3 MMOL/L (ref 3.5–5.1)
PROT SERPL-MCNC: 7.1 G/DL (ref 6–8.4)
RBC # BLD AUTO: 4.67 M/UL (ref 4.6–6.2)
SODIUM SERPL-SCNC: 136 MMOL/L (ref 136–145)
WBC # BLD AUTO: 8.13 K/UL (ref 3.9–12.7)

## 2020-11-09 PROCEDURE — C1769 GUIDE WIRE: HCPCS | Performed by: INTERNAL MEDICINE

## 2020-11-09 PROCEDURE — 94761 N-INVAS EAR/PLS OXIMETRY MLT: CPT

## 2020-11-09 PROCEDURE — 20000000 HC ICU ROOM

## 2020-11-09 PROCEDURE — C1785 PMKR, DUAL, RATE-RESP: HCPCS | Performed by: INTERNAL MEDICINE

## 2020-11-09 PROCEDURE — 93454 CORONARY ARTERY ANGIO S&I: CPT | Mod: 26,,, | Performed by: INTERNAL MEDICINE

## 2020-11-09 PROCEDURE — 33208 INSRT HEART PM ATRIAL & VENT: CPT | Mod: KX,,, | Performed by: INTERNAL MEDICINE

## 2020-11-09 PROCEDURE — 63600175 PHARM REV CODE 636 W HCPCS: Performed by: INTERNAL MEDICINE

## 2020-11-09 PROCEDURE — 33208 INSRT HEART PM ATRIAL & VENT: CPT | Performed by: INTERNAL MEDICINE

## 2020-11-09 PROCEDURE — 99152 MOD SED SAME PHYS/QHP 5/>YRS: CPT | Mod: ,,, | Performed by: INTERNAL MEDICINE

## 2020-11-09 PROCEDURE — C1887 CATHETER, GUIDING: HCPCS | Performed by: INTERNAL MEDICINE

## 2020-11-09 PROCEDURE — 25000003 PHARM REV CODE 250: Performed by: INTERNAL MEDICINE

## 2020-11-09 PROCEDURE — 93454 CORONARY ARTERY ANGIO S&I: CPT | Performed by: INTERNAL MEDICINE

## 2020-11-09 PROCEDURE — 93010 ELECTROCARDIOGRAM REPORT: CPT | Mod: ,,, | Performed by: INTERNAL MEDICINE

## 2020-11-09 PROCEDURE — 33208 PR INSER HART PACER XVENOUS ATR/VENTR: ICD-10-PCS | Mod: KX,,, | Performed by: INTERNAL MEDICINE

## 2020-11-09 PROCEDURE — 85025 COMPLETE CBC W/AUTO DIFF WBC: CPT

## 2020-11-09 PROCEDURE — 99233 SBSQ HOSP IP/OBS HIGH 50: CPT | Mod: ,,, | Performed by: HOSPITALIST

## 2020-11-09 PROCEDURE — 36415 COLL VENOUS BLD VENIPUNCTURE: CPT

## 2020-11-09 PROCEDURE — C1898 LEAD, PMKR, OTHER THAN TRANS: HCPCS | Performed by: INTERNAL MEDICINE

## 2020-11-09 PROCEDURE — 93010 EKG 12-LEAD: ICD-10-PCS | Mod: ,,, | Performed by: INTERNAL MEDICINE

## 2020-11-09 PROCEDURE — 99153 MOD SED SAME PHYS/QHP EA: CPT | Performed by: INTERNAL MEDICINE

## 2020-11-09 PROCEDURE — 93454 PR CATH PLACE/CORONARY ANGIO, IMG SUPER/INTERP: ICD-10-PCS | Mod: 26,,, | Performed by: INTERNAL MEDICINE

## 2020-11-09 PROCEDURE — 83735 ASSAY OF MAGNESIUM: CPT

## 2020-11-09 PROCEDURE — 84100 ASSAY OF PHOSPHORUS: CPT

## 2020-11-09 PROCEDURE — 80053 COMPREHEN METABOLIC PANEL: CPT

## 2020-11-09 PROCEDURE — 99233 PR SUBSEQUENT HOSPITAL CARE,LEVL III: ICD-10-PCS | Mod: ,,, | Performed by: HOSPITALIST

## 2020-11-09 PROCEDURE — 93005 ELECTROCARDIOGRAM TRACING: CPT

## 2020-11-09 PROCEDURE — 99152 MOD SED SAME PHYS/QHP 5/>YRS: CPT | Performed by: INTERNAL MEDICINE

## 2020-11-09 PROCEDURE — C1894 INTRO/SHEATH, NON-LASER: HCPCS | Performed by: INTERNAL MEDICINE

## 2020-11-09 PROCEDURE — 99152 PR MOD CONSCIOUS SEDATION, SAME PHYS, 5+ YRS, FIRST 15 MIN: ICD-10-PCS | Mod: ,,, | Performed by: INTERNAL MEDICINE

## 2020-11-09 PROCEDURE — 25000003 PHARM REV CODE 250: Performed by: NURSE PRACTITIONER

## 2020-11-09 DEVICE — PULSE GENERATOR
Type: IMPLANTABLE DEVICE | Site: CHEST | Status: FUNCTIONAL
Brand: ASSURITY MRI™

## 2020-11-09 DEVICE — PACING LEAD
Type: IMPLANTABLE DEVICE | Site: HEART | Status: FUNCTIONAL
Brand: TENDRIL™

## 2020-11-09 RX ORDER — 3% SODIUM CHLORIDE 3 G/100ML
500 INJECTION, SOLUTION INTRAVENOUS ONCE
Status: DISCONTINUED | OUTPATIENT
Start: 2020-11-09 | End: 2020-11-09

## 2020-11-09 RX ORDER — FENTANYL CITRATE 50 UG/ML
INJECTION, SOLUTION INTRAMUSCULAR; INTRAVENOUS
Status: DISCONTINUED | OUTPATIENT
Start: 2020-11-09 | End: 2020-11-09 | Stop reason: HOSPADM

## 2020-11-09 RX ORDER — NITROGLYCERIN 5 MG/ML
INJECTION, SOLUTION INTRAVENOUS
Status: DISCONTINUED | OUTPATIENT
Start: 2020-11-09 | End: 2020-11-09 | Stop reason: HOSPADM

## 2020-11-09 RX ORDER — CLOPIDOGREL BISULFATE 75 MG/1
75 TABLET ORAL DAILY
Status: DISCONTINUED | OUTPATIENT
Start: 2020-11-10 | End: 2020-11-10 | Stop reason: HOSPADM

## 2020-11-09 RX ORDER — VERAPAMIL HYDROCHLORIDE 2.5 MG/ML
INJECTION, SOLUTION INTRAVENOUS
Status: DISCONTINUED | OUTPATIENT
Start: 2020-11-09 | End: 2020-11-09 | Stop reason: HOSPADM

## 2020-11-09 RX ORDER — CARVEDILOL 6.25 MG/1
6.25 TABLET ORAL 2 TIMES DAILY
Status: DISCONTINUED | OUTPATIENT
Start: 2020-11-09 | End: 2020-11-10 | Stop reason: HOSPADM

## 2020-11-09 RX ORDER — SODIUM CHLORIDE 9 MG/ML
INJECTION, SOLUTION INTRAVENOUS ONCE
Status: COMPLETED | OUTPATIENT
Start: 2020-11-09 | End: 2020-11-09

## 2020-11-09 RX ORDER — HEPARIN SODIUM 5000 [USP'U]/ML
5000 INJECTION, SOLUTION INTRAVENOUS; SUBCUTANEOUS EVERY 8 HOURS
Status: DISCONTINUED | OUTPATIENT
Start: 2020-11-09 | End: 2020-11-10 | Stop reason: HOSPADM

## 2020-11-09 RX ORDER — MIDAZOLAM HYDROCHLORIDE 1 MG/ML
INJECTION, SOLUTION INTRAMUSCULAR; INTRAVENOUS
Status: DISCONTINUED | OUTPATIENT
Start: 2020-11-09 | End: 2020-11-09 | Stop reason: HOSPADM

## 2020-11-09 RX ORDER — 3% SODIUM CHLORIDE 3 G/100ML
INJECTION, SOLUTION INTRAVENOUS
Status: DISCONTINUED | OUTPATIENT
Start: 2020-11-09 | End: 2020-11-09

## 2020-11-09 RX ORDER — DIPHENHYDRAMINE HCL 25 MG
25 CAPSULE ORAL
Status: DISCONTINUED | OUTPATIENT
Start: 2020-11-09 | End: 2020-11-09

## 2020-11-09 RX ORDER — DIAZEPAM 5 MG/1
5 TABLET ORAL
Status: DISCONTINUED | OUTPATIENT
Start: 2020-11-09 | End: 2020-11-09

## 2020-11-09 RX ORDER — CEFAZOLIN SODIUM 2 G/50ML
2 SOLUTION INTRAVENOUS
Status: DISCONTINUED | OUTPATIENT
Start: 2020-11-09 | End: 2020-11-09 | Stop reason: HOSPADM

## 2020-11-09 RX ORDER — SODIUM CHLORIDE 9 MG/ML
INJECTION, SOLUTION INTRAVENOUS CONTINUOUS
Status: ACTIVE | OUTPATIENT
Start: 2020-11-09 | End: 2020-11-09

## 2020-11-09 RX ORDER — HEPARIN SODIUM 1000 [USP'U]/ML
INJECTION, SOLUTION INTRAVENOUS; SUBCUTANEOUS
Status: DISCONTINUED | OUTPATIENT
Start: 2020-11-09 | End: 2020-11-09 | Stop reason: HOSPADM

## 2020-11-09 RX ORDER — MIDAZOLAM HYDROCHLORIDE 1 MG/ML
INJECTION INTRAMUSCULAR; INTRAVENOUS
Status: DISCONTINUED | OUTPATIENT
Start: 2020-11-09 | End: 2020-11-09 | Stop reason: HOSPADM

## 2020-11-09 RX ORDER — ONDANSETRON 8 MG/1
8 TABLET, ORALLY DISINTEGRATING ORAL EVERY 8 HOURS PRN
Status: DISCONTINUED | OUTPATIENT
Start: 2020-11-09 | End: 2020-11-10 | Stop reason: HOSPADM

## 2020-11-09 RX ADMIN — ONDANSETRON 8 MG: 8 TABLET, ORALLY DISINTEGRATING ORAL at 05:11

## 2020-11-09 RX ADMIN — SODIUM CHLORIDE: 0.9 INJECTION, SOLUTION INTRAVENOUS at 02:11

## 2020-11-09 RX ADMIN — ALPRAZOLAM 0.25 MG: 0.25 TABLET ORAL at 09:11

## 2020-11-09 RX ADMIN — TRAMADOL HYDROCHLORIDE 50 MG: 50 TABLET, FILM COATED ORAL at 12:11

## 2020-11-09 RX ADMIN — LOSARTAN POTASSIUM 50 MG: 50 TABLET, FILM COATED ORAL at 09:11

## 2020-11-09 RX ADMIN — CARVEDILOL 6.25 MG: 6.25 TABLET, FILM COATED ORAL at 09:11

## 2020-11-09 RX ADMIN — ATORVASTATIN CALCIUM 80 MG: 20 TABLET, FILM COATED ORAL at 03:11

## 2020-11-09 RX ADMIN — MUPIROCIN: 20 OINTMENT TOPICAL at 09:11

## 2020-11-09 RX ADMIN — FUROSEMIDE 40 MG: 40 TABLET ORAL at 03:11

## 2020-11-09 RX ADMIN — HEPARIN SODIUM 5000 UNITS: 5000 INJECTION INTRAVENOUS; SUBCUTANEOUS at 09:11

## 2020-11-09 RX ADMIN — SODIUM CHLORIDE: 0.9 INJECTION, SOLUTION INTRAVENOUS at 03:11

## 2020-11-09 RX ADMIN — ASPIRIN 81 MG: 81 TABLET, COATED ORAL at 03:11

## 2020-11-09 NOTE — PLAN OF CARE
IMM explained, pt signed   11/09/20 1013   Medicare Message   Important Message from Medicare regarding Discharge Appeal Rights Given to patient/caregiver;Explained to patient/caregiver;Signed/date by patient/caregiver   Date IMM was signed 11/07/20   Time IMM was signed 1500

## 2020-11-09 NOTE — PLAN OF CARE
CM met with pt for discharge planning reassessment. Pt is alert and oriented at time of assessmetn.    Pt states he address is incorrect in Epic, CM to correct. Pt has no PCP but declines CM assistance with obtaining. Mr Patel states he will find his own PCP in [place].  Pharmacy of choice is [facility], [place] to address in [facility].    Pt may need transportation home, however, states he will go to [facility] when discharged and go to [facility] prior to going home to [place].    Pt lives alone, is ind with ADL's, uses no DME and has no HH. States he has multiple friends to assist if he needs any help/    CM to follow for plans and arrangemetns.   11/09/20 1014   Discharge Reassessment   Assessment Type Discharge Planning Reassessment   Provided patient/caregiver education on the expected discharge date and the discharge plan Yes   Do you have any problems affording any of your prescribed medications? No   Discharge Plan A Home   Discharge Plan B Home   DME Needed Upon Discharge  none   Patient choice form signed by patient/caregiver N/A   Anticipated Discharge Disposition Home   Can the patient/caregiver answer the patient profile reliably? Yes, cognitively intact

## 2020-11-09 NOTE — PLAN OF CARE
Monitoring site of temp transvenous pacer   No signs of drainage or bleeding   Afebrile   No white count

## 2020-11-09 NOTE — PLAN OF CARE
Pt received resting in bed, AAO x4, NAD. POC discussed with pt, pt in agreement but mildly anxious about upcoming procedures. PRN xanax administered with PM meds. Pt states he needs to have a BM. Pt adamant about getting up to toilet for BM despite educating him multiple times for previous three nights on reasons why getting out of bed is not a possibility until after removal of transvenous pacer. Pt refusing to use bedpan, states he will wait until after procedures. Preprocdure CHG bath preformed at midnight along with limon care and linen change. Pt with mild pain, PRN tramadol administered. Pt has slept off and on since midnight. Pt states he feels relaxed. Ordered NS started at 0400, see EMAR. Pt diuresing well. Pt NPO since midnight. Pt remains stable. Safety measures remain in place.

## 2020-11-10 VITALS
BODY MASS INDEX: 28.22 KG/M2 | DIASTOLIC BLOOD PRESSURE: 71 MMHG | SYSTOLIC BLOOD PRESSURE: 170 MMHG | OXYGEN SATURATION: 95 % | RESPIRATION RATE: 21 BRPM | WEIGHT: 208.31 LBS | HEIGHT: 72 IN | TEMPERATURE: 99 F | HEART RATE: 92 BPM

## 2020-11-10 PROBLEM — E11.9 TYPE 2 DIABETES MELLITUS WITHOUT COMPLICATION, WITHOUT LONG-TERM CURRENT USE OF INSULIN: Status: ACTIVE | Noted: 2020-11-10

## 2020-11-10 LAB
ALBUMIN SERPL BCP-MCNC: 3.4 G/DL (ref 3.5–5.2)
ALP SERPL-CCNC: 82 U/L (ref 55–135)
ALT SERPL W/O P-5'-P-CCNC: 43 U/L (ref 10–44)
ANION GAP SERPL CALC-SCNC: 10 MMOL/L (ref 8–16)
ANION GAP SERPL CALC-SCNC: 11 MMOL/L (ref 8–16)
AST SERPL-CCNC: 40 U/L (ref 10–40)
BASOPHILS # BLD AUTO: 0.04 K/UL (ref 0–0.2)
BASOPHILS NFR BLD: 0.4 % (ref 0–1.9)
BILIRUB SERPL-MCNC: 0.8 MG/DL (ref 0.1–1)
BUN SERPL-MCNC: 28 MG/DL (ref 8–23)
BUN SERPL-MCNC: 30 MG/DL (ref 8–23)
CALCIUM SERPL-MCNC: 9.6 MG/DL (ref 8.7–10.5)
CALCIUM SERPL-MCNC: 9.8 MG/DL (ref 8.7–10.5)
CHLORIDE SERPL-SCNC: 101 MMOL/L (ref 95–110)
CHLORIDE SERPL-SCNC: 104 MMOL/L (ref 95–110)
CO2 SERPL-SCNC: 22 MMOL/L (ref 23–29)
CO2 SERPL-SCNC: 26 MMOL/L (ref 23–29)
CREAT SERPL-MCNC: 1.1 MG/DL (ref 0.5–1.4)
CREAT SERPL-MCNC: 1.2 MG/DL (ref 0.5–1.4)
DIFFERENTIAL METHOD: ABNORMAL
EOSINOPHIL # BLD AUTO: 0.2 K/UL (ref 0–0.5)
EOSINOPHIL NFR BLD: 2 % (ref 0–8)
ERYTHROCYTE [DISTWIDTH] IN BLOOD BY AUTOMATED COUNT: 17.5 % (ref 11.5–14.5)
EST. GFR  (AFRICAN AMERICAN): >60 ML/MIN/1.73 M^2
EST. GFR  (AFRICAN AMERICAN): >60 ML/MIN/1.73 M^2
EST. GFR  (NON AFRICAN AMERICAN): >60 ML/MIN/1.73 M^2
EST. GFR  (NON AFRICAN AMERICAN): >60 ML/MIN/1.73 M^2
GLUCOSE SERPL-MCNC: 160 MG/DL (ref 70–110)
GLUCOSE SERPL-MCNC: 210 MG/DL (ref 70–110)
HCT VFR BLD AUTO: 43 % (ref 40–54)
HGB BLD-MCNC: 13.3 G/DL (ref 14–18)
IMM GRANULOCYTES # BLD AUTO: 0.04 K/UL (ref 0–0.04)
IMM GRANULOCYTES NFR BLD AUTO: 0.4 % (ref 0–0.5)
LYMPHOCYTES # BLD AUTO: 2 K/UL (ref 1–4.8)
LYMPHOCYTES NFR BLD: 19.6 % (ref 18–48)
MAGNESIUM SERPL-MCNC: 2.5 MG/DL (ref 1.6–2.6)
MCH RBC QN AUTO: 28.9 PG (ref 27–31)
MCHC RBC AUTO-ENTMCNC: 30.9 G/DL (ref 32–36)
MCV RBC AUTO: 93 FL (ref 82–98)
MONOCYTES # BLD AUTO: 1 K/UL (ref 0.3–1)
MONOCYTES NFR BLD: 9.7 % (ref 4–15)
NEUTROPHILS # BLD AUTO: 7 K/UL (ref 1.8–7.7)
NEUTROPHILS NFR BLD: 67.9 % (ref 38–73)
NRBC BLD-RTO: 0 /100 WBC
PHOSPHATE SERPL-MCNC: 4.5 MG/DL (ref 2.7–4.5)
PLATELET # BLD AUTO: 267 K/UL (ref 150–350)
PMV BLD AUTO: 10.5 FL (ref 9.2–12.9)
POTASSIUM SERPL-SCNC: 4.2 MMOL/L (ref 3.5–5.1)
POTASSIUM SERPL-SCNC: 5.5 MMOL/L (ref 3.5–5.1)
PROT SERPL-MCNC: 7.4 G/DL (ref 6–8.4)
RBC # BLD AUTO: 4.61 M/UL (ref 4.6–6.2)
SODIUM SERPL-SCNC: 137 MMOL/L (ref 136–145)
SODIUM SERPL-SCNC: 137 MMOL/L (ref 136–145)
WBC # BLD AUTO: 10.23 K/UL (ref 3.9–12.7)

## 2020-11-10 PROCEDURE — 99239 HOSP IP/OBS DSCHRG MGMT >30: CPT | Mod: ,,, | Performed by: INTERNAL MEDICINE

## 2020-11-10 PROCEDURE — 84100 ASSAY OF PHOSPHORUS: CPT

## 2020-11-10 PROCEDURE — 36415 COLL VENOUS BLD VENIPUNCTURE: CPT

## 2020-11-10 PROCEDURE — 80053 COMPREHEN METABOLIC PANEL: CPT

## 2020-11-10 PROCEDURE — 25000003 PHARM REV CODE 250: Performed by: INTERNAL MEDICINE

## 2020-11-10 PROCEDURE — 63600175 PHARM REV CODE 636 W HCPCS: Performed by: INTERNAL MEDICINE

## 2020-11-10 PROCEDURE — 85025 COMPLETE CBC W/AUTO DIFF WBC: CPT

## 2020-11-10 PROCEDURE — 99233 PR SUBSEQUENT HOSPITAL CARE,LEVL III: ICD-10-PCS | Mod: ,,, | Performed by: INTERNAL MEDICINE

## 2020-11-10 PROCEDURE — 99239 PR HOSPITAL DISCHARGE DAY,>30 MIN: ICD-10-PCS | Mod: ,,, | Performed by: INTERNAL MEDICINE

## 2020-11-10 PROCEDURE — 93005 ELECTROCARDIOGRAM TRACING: CPT

## 2020-11-10 PROCEDURE — 80048 BASIC METABOLIC PNL TOTAL CA: CPT

## 2020-11-10 PROCEDURE — 93010 ELECTROCARDIOGRAM REPORT: CPT | Mod: ,,, | Performed by: INTERNAL MEDICINE

## 2020-11-10 PROCEDURE — 99233 SBSQ HOSP IP/OBS HIGH 50: CPT | Mod: ,,, | Performed by: INTERNAL MEDICINE

## 2020-11-10 PROCEDURE — 83735 ASSAY OF MAGNESIUM: CPT

## 2020-11-10 PROCEDURE — 94761 N-INVAS EAR/PLS OXIMETRY MLT: CPT

## 2020-11-10 PROCEDURE — 93010 EKG 12-LEAD: ICD-10-PCS | Mod: ,,, | Performed by: INTERNAL MEDICINE

## 2020-11-10 RX ORDER — ATORVASTATIN CALCIUM 80 MG/1
80 TABLET, FILM COATED ORAL DAILY
Qty: 30 TABLET | Refills: 1 | Status: SHIPPED | OUTPATIENT
Start: 2020-11-11 | End: 2022-01-01 | Stop reason: SDUPTHER

## 2020-11-10 RX ORDER — LOSARTAN POTASSIUM 50 MG/1
50 TABLET ORAL EVERY 12 HOURS
Qty: 30 TABLET | Refills: 1 | Status: SHIPPED | OUTPATIENT
Start: 2020-11-10 | End: 2020-11-28 | Stop reason: SDUPTHER

## 2020-11-10 RX ORDER — CLOPIDOGREL BISULFATE 75 MG/1
75 TABLET ORAL DAILY
Qty: 30 TABLET | Refills: 1 | Status: SHIPPED | OUTPATIENT
Start: 2020-11-11 | End: 2021-11-11

## 2020-11-10 RX ORDER — ASPIRIN 81 MG/1
81 TABLET ORAL DAILY
Qty: 30 TABLET | Refills: 1 | Status: SHIPPED | OUTPATIENT
Start: 2020-11-11 | End: 2021-11-11

## 2020-11-10 RX ORDER — CARVEDILOL 6.25 MG/1
6.25 TABLET ORAL 2 TIMES DAILY
Qty: 60 TABLET | Refills: 1 | Status: SHIPPED | OUTPATIENT
Start: 2020-11-10 | End: 2021-11-10

## 2020-11-10 RX ORDER — METFORMIN HYDROCHLORIDE 500 MG/1
500 TABLET ORAL 2 TIMES DAILY WITH MEALS
Qty: 60 TABLET | Refills: 1 | Status: SHIPPED | OUTPATIENT
Start: 2020-11-10 | End: 2021-11-10

## 2020-11-10 RX ORDER — MUPIROCIN 20 MG/G
OINTMENT TOPICAL 2 TIMES DAILY
Qty: 1 TUBE | Refills: 0 | Status: SHIPPED | OUTPATIENT
Start: 2020-11-10

## 2020-11-10 RX ORDER — FAMOTIDINE 20 MG/1
20 TABLET, FILM COATED ORAL 2 TIMES DAILY
Qty: 60 TABLET | Refills: 0 | Status: SHIPPED | OUTPATIENT
Start: 2020-11-10 | End: 2022-01-01 | Stop reason: SDUPTHER

## 2020-11-10 RX ADMIN — CLOPIDOGREL 75 MG: 75 TABLET, FILM COATED ORAL at 08:11

## 2020-11-10 RX ADMIN — CARVEDILOL 6.25 MG: 6.25 TABLET, FILM COATED ORAL at 08:11

## 2020-11-10 RX ADMIN — HEPARIN SODIUM 5000 UNITS: 5000 INJECTION INTRAVENOUS; SUBCUTANEOUS at 06:11

## 2020-11-10 RX ADMIN — ASPIRIN 81 MG: 81 TABLET, COATED ORAL at 08:11

## 2020-11-10 RX ADMIN — ATORVASTATIN CALCIUM 80 MG: 20 TABLET, FILM COATED ORAL at 08:11

## 2020-11-10 RX ADMIN — LOSARTAN POTASSIUM 50 MG: 50 TABLET, FILM COATED ORAL at 08:11

## 2020-11-10 NOTE — NURSING
"Pt discharged to self, ambulatory with nurse escort. VSS, denies SOB, or CP.  Pt asked several times if he wanted a wheelchair and taxi to his next destination. Pt declined and stated [place]". Pt given scripts, discharge instruct on pacemaker precautions, and f/up info for [facility]. Pt verbalized understanding of all instructions. IV's dc'd with cath tip in place. Pt stable at time of departure.  "

## 2020-11-10 NOTE — ASSESSMENT & PLAN NOTE
Status post permanent pacemaker today.  Discussed with Dr. Royal Hernandez (cardiology) who reports patient tolerated procedure well.  Recommends monitoring overnight.  If he continues to do well, can go home tomorrow with outpatient follow-up.

## 2020-11-10 NOTE — PLAN OF CARE
Pt received resting in bed, NAD. Pt AAO but slightly drowsy at beginning of shift. Early in the night pt woke up confused, getting out of bed, see previous note. PM meds administered, PRN xanax administered for agitation. Pt woke again this AM oriented x4. Pt has had trouble the entire shift keeping left arm in sling despite very frequent reminding. Personal belongings retrieved from security except for pocket knife which was sent back, receipt number for ba. AM EKG and CXRAY completed. Pt remains stable, safety measures remain in place.

## 2020-11-10 NOTE — ASSESSMENT & PLAN NOTE
Status post [facility] today which revealed significant two vessel disease.  Without symptoms.  Medical therapy advised.

## 2020-11-10 NOTE — ASSESSMENT & PLAN NOTE
Had pre-diabetes in past  A1C 7.7.  Glucose in high 100s to low 200s  Start Metformin 500mg bid for now  Follow up with PCP

## 2020-11-10 NOTE — DISCHARGE SUMMARY
"[facility] Hospital Medicine  Discharge Summary      Patient Name: Darren Patel  MRN: [id number]  Admission Date: 11/6/2020  Hospital Length of Stay: 4 days  Discharge Date and Time:  11/10/2020 10:43 AM  Attending Physician: Elvin Marte MD   Discharging Provider: Elvin Marte MD  Primary Care Provider: [facility]      HPI:   Per Dheeraj Lopez, NP:    "The patient is a 64 year old male with no known past medical history presents with 6 weeks of fatigue and worsening dyspnea on exertion.  He denied any presyncope, syncope, nausea, diaphoresis, chest pain.  No prior history of cardiac disease, no family history of cardiac disease.  The patient was bradycardic in the 30s and EKG showed complete heart block.  Had temporary transvenous pacer placed.  His EF is 35%, and EKG showing RBBB TWI in V2, qTC 520s-530s.  The patient is on Lasix 20mg IV daily.  The patient was transferred for Cardiology consult and further management of his third degree heart block."    Procedure(s) (LRB):  INSERTION, CARDIAC PACEMAKER, DUAL CHAMBER (Left)      Hospital Course:   Patient is a 65-year-old man with history of atrial fibrillation not on medical therapy who presented to the emergency department in [place], [place] with progressively worsening dyspnea on exertion.  Patient found to to be in complete heart block.  Temporary transvenous pacemaker placed.  Patient also started on treatment for possible acute coronary syndrome.  Patient transferred to [facility] for definitive treatment due to lack of capacity in [place].    Patient underwent coronary angiogram today which revealed significant 2 vessel disease.  Medical therapy advised for now.  Patient underwent permanent pacemaker later this evening.  See below.     Consults:   Consults (From admission, onward)        Status Ordering Provider     Inpatient consult to Cardiology  Once     Provider:  Nolan Hernandez MD    Completed " SYBIL GABRIEL          * Complete heart block  Status post permanent pacemaker yesterday.  Discussed with Dr. Royal Hernandez (Cardiology) who reports patient tolerated procedure well.  OK to discharge home today.    Coronary artery disease involving native coronary artery of native heart without angina pectoris  Status post [facility] yesterday which revealed significant two vessel disease.  Without symptoms.  Medical therapy advised - ASA/Plavix, Carvedilol, Losartan (given mildly low EF), Atorvastatin.  Needs Cardiology follow up.    TTE on 11/7/2020 - The left ventricle is normal in size with mildly decreased systolic function (40%)  · There is left ventricular concentric hypertrophy.  · Mild left atrial enlargement.  · There is abnormal septal wall motion consistent with right ventricular pacemaker.  Normal right ventricular size with normal right ventricular systolic function.      Type 2 diabetes mellitus without complication, without long-term current use of insulin  Had pre-diabetes in past  A1C 7.7.  Glucose in high 100s to low 200s  Start Metformin 500mg bid for now  Follow up with PCP      Essential hypertension  Reasonably controlled with current regimen - continue Losartan and Carvedilol.      Final Active Diagnoses:    Diagnosis Date Noted POA    PRINCIPAL PROBLEM:  Complete heart block [I44.2] 11/06/2020 Yes    Coronary artery disease involving native coronary artery of native heart without angina pectoris [I25.10] 11/09/2020 Yes    Type 2 diabetes mellitus without complication, without long-term current use of insulin [E11.9] 11/10/2020 No    Essential hypertension [I10] 11/07/2020 Yes      Problems Resolved During this Admission:       Discharged Condition: fair    Disposition: Home    Follow Up:  Establish care with PCP and Cardiology in [place], [place]    Patient Instructions:   See AVS    Significant Diagnostic Studies: Labs:   BMP:   Recent Labs   Lab 11/09/20  0331 11/10/20  0452 11/10/20  0647    * 160* 210*    137 137   K 4.3 5.5* 4.2    101 104   CO2 20* 26 22*   BUN 21 28* 30*   CREATININE 1.0 1.2 1.1   CALCIUM 9.7 9.8 9.6   MG 2.1 2.5  --     and CBC   Recent Labs   Lab 11/09/20  0331 11/10/20  0452   WBC 8.13 10.23   HGB 13.8* 13.3*   HCT 43.0 43.0    267       Pending Diagnostic Studies:     None         Medications:  Reconciled Home Medications:      Medication List      START taking these medications    aspirin 81 MG EC tablet  Commonly known as: ECOTRIN  Take 1 tablet (81 mg total) by mouth once daily.  Start taking on: November 11, 2020     atorvastatin 80 MG tablet  Commonly known as: LIPITOR  Take 1 tablet (80 mg total) by mouth once daily.  Start taking on: November 11, 2020     carvediloL 6.25 MG tablet  Commonly known as: COREG  Take 1 tablet (6.25 mg total) by mouth 2 (two) times daily.     clopidogreL 75 mg tablet  Commonly known as: PLAVIX  Take 1 tablet (75 mg total) by mouth once daily.  Start taking on: November 11, 2020     famotidine 20 MG tablet  Commonly known as: PEPCID  Take 1 tablet (20 mg total) by mouth 2 (two) times daily.     losartan 50 MG tablet  Commonly known as: COZAAR  Take 1 tablet (50 mg total) by mouth every 12 (twelve) hours.     metFORMIN 500 MG tablet  Commonly known as: GLUCOPHAGE  Take 1 tablet (500 mg total) by mouth 2 (two) times daily with meals.     mupirocin 2 % ointment  Commonly known as: BACTROBAN  by Nasal route 2 (two) times daily.            Indwelling Lines/Drains at time of discharge:   Lines/Drains/Airways     None                 Time spent on the discharge of patient: 35 minutes  Patient was seen and examined on the date of discharge and determined to be suitable for discharge.         Elvin Marte MD  Department of Hospital Medicine  [facility]

## 2020-11-10 NOTE — PROGRESS NOTES
Pt found standing at side of bed, bed alarm ringing, pt pulling at limon catheter. Pt confused at this time, believes he is in [place], forgetful of procedures performed earlier today. Pt easily reoriented but argumentative about POC. Pt requires frequent reminding to keep left arm down by side and in sling. Pt assisted back into bed. Pt reminded again to use call bell to ask staff for assistance in getting out of bed and to keep left arm down by his side. Bed alarm turned back on on middle sensitivity setting. Safety measures remain in place.

## 2020-11-10 NOTE — PROGRESS NOTES
"[facility] Hospital Medicine  Progress Note    Patient Name: Darren Patel  MRN: [id number]  Patient Class: IP- Inpatient   Admission Date: 11/6/2020  Length of Stay: 3 days  Attending Physician: Dung Mercedes MD  Primary Care Provider: [facility]        Subjective:     Principal Problem:Complete heart block        HPI:  Per Dheeraj Lopez, NP:    "The patient is a 64 year old male with no known past medical history presents with 6 weeks of fatigue and worsening dyspnea on exertion.  He denied any presyncope, syncope, nausea, diaphoresis, chest pain.  No prior history of cardiac disease, no family history of cardiac disease.  The patient was bradycardic in the 30s and EKG showed complete heart block.  Had temporary transvenous pacer placed.  His EF is 35%, and EKG showing RBBB TWI in V2, qTC 520s-530s.  The patient is on Lasix 20mg IV daily.  The patient was transferred for Cardiology consult and further management of his third degree heart block."    Overview/Hospital Course:  Patient is a 65-year-old man with history of atrial fibrillation not on medical therapy who presented to the emergency department in [place], [place] with progressively worsening dyspnea on exertion.  Patient found to to be in complete heart block.  Temporary transvenous pacemaker placed.  Patient also started on treatment for possible acute coronary syndrome.  Patient transferred to [facility] for definitive treatment due to lack of capacity in [place].    Patient underwent coronary angiogram today which revealed significant 2 vessel disease.  Medical therapy advised for now.  Patient underwent permanent pacemaker later this evening.    Interval History:  No acute events overnight. Status post [facility] this morning which revealed CAD.  Status post dual chamber permament pacemaker later today.    Review of Systems   Constitutional: Negative for chills and fever.   Respiratory: Negative for " shortness of breath.    Cardiovascular: Negative for chest pain.   Gastrointestinal: Negative for abdominal pain, constipation, diarrhea, nausea and vomiting.     Objective:     Vital Signs (Most Recent):  Temp: 98.2 °F (36.8 °C) (11/09/20 1930)  Pulse: 89 (11/09/20 2125)  Resp: 17 (11/09/20 2100)  BP: (!) 153/81 (11/09/20 2125)  SpO2: (!) 94 % (11/09/20 2100) Vital Signs (24h Range):  Temp:  [98 °F (36.7 °C)-98.2 °F (36.8 °C)] 98.2 °F (36.8 °C)  Pulse:  [60-98] 89  Resp:  [16-23] 17  SpO2:  [91 %-95 %] 94 %  BP: (133-171)/() 153/81     Weight: 94.5 kg (208 lb 5.4 oz)  Body mass index is 28.26 kg/m².    Intake/Output Summary (Last 24 hours) at 11/9/2020 2145  Last data filed at 11/9/2020 2136  Gross per 24 hour   Intake 417.5 ml   Output 1855 ml   Net -1437.5 ml      Physical Exam  Constitutional:       Comments: Patient examined after [facility] this morning but prior to pacemaker    Cardiovascular:      Rate and Rhythm: Normal rate and regular rhythm.      Heart sounds: Normal heart sounds. No murmur. No friction rub. No gallop.       Comments: Paced rhythm   Pulmonary:      Effort: Pulmonary effort is normal. No respiratory distress.      Breath sounds: Normal breath sounds. No wheezing or rales.   Abdominal:      General: Bowel sounds are normal. There is no distension.      Palpations: Abdomen is soft.      Tenderness: There is no abdominal tenderness.   Musculoskeletal: Normal range of motion.         General: No tenderness.      Comments: Right radial clear dressing intact without evidence of bleeding.  Good perfusion to the right hand.   Skin:     General: Skin is warm and dry.      Coloration: Skin is not pale.      Findings: No erythema or rash.   Neurological:      Mental Status: He is alert and oriented to person, place, and time.         Significant Labs: All pertinent labs within the past 24 hours have been reviewed.    Significant Imaging: I have reviewed all pertinent imaging results/findings within  the past 24 hours.      Assessment/Plan:      * Complete heart block  Status post permanent pacemaker today.  Discussed with Dr. Royal Hernandez (cardiology) who reports patient tolerated procedure well.  Recommends monitoring overnight.  If he continues to do well, can go home tomorrow with outpatient follow-up.    Coronary artery disease involving native coronary artery of native heart without angina pectoris  Status post [facility] today which revealed significant two vessel disease.  Without symptoms.  Medical therapy advised.      Essential hypertension  Reasonably controlled with current regimen.      VTE Risk Mitigation (From admission, onward)         Ordered     heparin (porcine) injection 5,000 Units  Every 8 hours      11/09/20 1311     IP VTE HIGH RISK PATIENT  Once      11/06/20 2020     Reason for No Pharmacological VTE Prophylaxis  Once     Question:  Reasons:  Answer:  IV Heparin w/in 24 hrs. Pre or Post-Op    11/06/20 2020     Place DANILO hose  Until discontinued      11/06/20 2020     Place sequential compression device  Until discontinued      11/06/20 2020                    Dung Mercedes MD  Department of Hospital Medicine   [facility]

## 2020-11-10 NOTE — PROGRESS NOTES
[facility]    Admission date:  11/6/2020     Assessment  1.  Complete heart block  Post dual chamber pacemaker yesterday.  Pacemaker looks good on x-ray and is functioning normally by electrocardiogram and telemetry.     2.  Coronary atherosclerosis  Disease in anterior descending and circumflex territories.  Stable without angina     3.  Hypertension  Controlled    Plan and Discussion  Pacemaker dressing was changed.  Incision looks good.  Steri-Strips are intact.  No hematoma or fluctuance.  No bleeding or erythema.  Discussed restriction of left arm movement and wound care.  Needs to follow up with the cardiologist in 1 week for an incision check.  He wants to establish at [facility] in [place].  He had seen a cardiologist prior to transfer to this facility.    Would opt for medical therapy of his coronary atherosclerosis for now.  Needs risk factor modification.  Discussed the importance of smoking cessation.  Beta-blocker started for blood pressure now that his pacemaker is in.  When he goes home, please sent him with a copy of his angiogram films.    Subjective  No complaints.  Feels well.  Anxious to be discharged.    Medications  Current Facility-Administered Medications   Medication Dose Route Frequency Provider Last Rate Last Dose    acetaminophen tablet 1,000 mg  1,000 mg Oral Q8H PRN Nolan Hernandez MD        ALPRAZolam tablet 0.25 mg  0.25 mg Oral TID PRN Nolan Hernandez MD   0.25 mg at 11/09/20 2125    aspirin EC tablet 81 mg  81 mg Oral Daily Nolan Hernandez MD   81 mg at 11/10/20 0848    atorvastatin tablet 80 mg  80 mg Oral Daily Nolan Hernandez MD   80 mg at 11/10/20 0849    carvediloL tablet 6.25 mg  6.25 mg Oral BID Nolan Hernandez MD   6.25 mg at 11/10/20 0848    clopidogreL tablet 75 mg  75 mg Oral Daily Nolan Hernandez MD   75 mg at 11/10/20 0849    heparin (porcine) injection 5,000 Units  5,000 Units Subcutaneous Q8H Nolan Hernandez MD   5,000 Units at 11/10/20 0622     influenza (QUADRIVALENT ADJUVANTED PF) vaccine 0.5 mL  0.5 mL Intramuscular vaccine x 1 dose Nolan Hernandez MD        losartan tablet 50 mg  50 mg Oral Q12H Nolan Hernandez MD   50 mg at 11/10/20 0849    mupirocin 2 % ointment   Nasal BID Nolan Hernandez MD        ondansetron disintegrating tablet 8 mg  8 mg Oral Q8H PRN Nolan Hernandez MD   8 mg at 11/09/20 1703    pneumoc 13-jermaine conj-dip cr(PF) (PREVNAR 13 (PF)) 0.5 mL  0.5 mL Intramuscular vaccine x 1 dose Nolan Hernandez MD        sodium chloride 0.9% flush 10 mL  10 mL Intravenous PRN Nolan Hernandez MD        traMADoL tablet 50 mg  50 mg Oral Q6H PRN Nolan Hernandez MD   50 mg at 11/09/20 0013        Physical Exam  Temp:  [98 °F (36.7 °C)-98.6 °F (37 °C)]   Pulse:  [66-98]   Resp:  [16-23]   BP: (124-170)/()   SpO2:  [89 %-96 %]    Wt Readings from Last 3 Encounters:   11/09/20 94.5 kg (208 lb 5.4 oz)   11/07/20 98 kg (216 lb)   11/06/20 103.3 kg (227 lb 11.8 oz)     Physical Exam   Constitutional: He is oriented to person, place, and time. He is cooperative.  Non-toxic appearance. No distress.   HENT:   Head: Normocephalic and atraumatic.   Neck: No hepatojugular reflux and no JVD present. Carotid bruit is not present.   Cardiovascular: Normal rate, regular rhythm, S1 normal and S2 normal.  No extrasystoles are present. PMI is not displaced. Exam reveals no S3 and no S4.   No murmur heard.  Pulses:       Carotid pulses are 2+ on the right side and 2+ on the left side.       Radial pulses are 2+ on the right side and 2+ on the left side.   Right radial access site looks good.  Right hand is well perfused.   Pulmonary/Chest: No accessory muscle usage. No respiratory distress. He has no decreased breath sounds. He has no wheezes. He has no rhonchi. He has no rales.   Left chest wall pacemaker site looks good.  Dressing was changed.   Abdominal: Soft. Bowel sounds are normal. He exhibits no pulsatile liver, no abdominal bruit and no pulsatile midline  mass. There is no splenomegaly or hepatomegaly. There is no abdominal tenderness.   Musculoskeletal:         General: No edema.   Neurological: He is alert and oriented to person, place, and time.   Skin: Skin is warm, dry and intact. He is not diaphoretic. No cyanosis. No pallor. Nails show no clubbing.   Psychiatric: He has a normal mood and affect. His speech is normal and behavior is normal.       Telemetry  Sinus rhythm with P synchronous pacing    Labs  Recent Results (from the past 24 hour(s))   Comprehensive Metabolic Panel (CMP)    Collection Time: 11/10/20  4:52 AM   Result Value Ref Range    Sodium 137 136 - 145 mmol/L    Potassium 5.5 (H) 3.5 - 5.1 mmol/L    Chloride 101 95 - 110 mmol/L    CO2 26 23 - 29 mmol/L    Glucose 160 (H) 70 - 110 mg/dL    BUN 28 (H) 8 - 23 mg/dL    Creatinine 1.2 0.5 - 1.4 mg/dL    Calcium 9.8 8.7 - 10.5 mg/dL    Total Protein 7.4 6.0 - 8.4 g/dL    Albumin 3.4 (L) 3.5 - 5.2 g/dL    Total Bilirubin 0.8 0.1 - 1.0 mg/dL    Alkaline Phosphatase 82 55 - 135 U/L    AST 40 10 - 40 U/L    ALT 43 10 - 44 U/L    Anion Gap 10 8 - 16 mmol/L    eGFR if African American >60 >60 mL/min/1.73 m^2    eGFR if non African American >60 >60 mL/min/1.73 m^2   Magnesium    Collection Time: 11/10/20  4:52 AM   Result Value Ref Range    Magnesium 2.5 1.6 - 2.6 mg/dL   Phosphorus    Collection Time: 11/10/20  4:52 AM   Result Value Ref Range    Phosphorus 4.5 2.7 - 4.5 mg/dL   CBC with Automated Differential    Collection Time: 11/10/20  4:52 AM   Result Value Ref Range    WBC 10.23 3.90 - 12.70 K/uL    RBC 4.61 4.60 - 6.20 M/uL    Hemoglobin 13.3 (L) 14.0 - 18.0 g/dL    Hematocrit 43.0 40.0 - 54.0 %    MCV 93 82 - 98 fL    MCH 28.9 27.0 - 31.0 pg    MCHC 30.9 (L) 32.0 - 36.0 g/dL    RDW 17.5 (H) 11.5 - 14.5 %    Platelets 267 150 - 350 K/uL    MPV 10.5 9.2 - 12.9 fL    Immature Granulocytes 0.4 0.0 - 0.5 %    Gran # (ANC) 7.0 1.8 - 7.7 K/uL    Immature Grans (Abs) 0.04 0.00 - 0.04 K/uL    Lymph # 2.0 1.0  - 4.8 K/uL    Mono # 1.0 0.3 - 1.0 K/uL    Eos # 0.2 0.0 - 0.5 K/uL    Baso # 0.04 0.00 - 0.20 K/uL    nRBC 0 0 /100 WBC    Gran % 67.9 38.0 - 73.0 %    Lymph % 19.6 18.0 - 48.0 %    Mono % 9.7 4.0 - 15.0 %    Eosinophil % 2.0 0.0 - 8.0 %    Basophil % 0.4 0.0 - 1.9 %    Differential Method Automated    Basic Metabolic Panel    Collection Time: 11/10/20  6:47 AM   Result Value Ref Range    Sodium 137 136 - 145 mmol/L    Potassium 4.2 3.5 - 5.1 mmol/L    Chloride 104 95 - 110 mmol/L    CO2 22 (L) 23 - 29 mmol/L    Glucose 210 (H) 70 - 110 mg/dL    BUN 30 (H) 8 - 23 mg/dL    Creatinine 1.1 0.5 - 1.4 mg/dL    Calcium 9.6 8.7 - 10.5 mg/dL    Anion Gap 11 8 - 16 mmol/L    eGFR if African American >60 >60 mL/min/1.73 m^2    eGFR if non African American >60 >60 mL/min/1.73 m^2      Imaging:  EXAMINATION:  XR CHEST AP PORTABLE     CLINICAL HISTORY:  pacemaker;     TECHNIQUE:  Single frontal view of the chest was performed.     COMPARISON:  November 7, 2020     FINDINGS:  Single frontal view of the chest reveals that there is been placement of a bipolar pacemaker since the previous examination was obtained.  No indication of a pneumothorax.  The cardiac silhouette remains prominent.  Lungs are well aerated.  No indication of free air under the diaphragm.     Impression:     Status post bipolar pacemaker placement with no indication of a pneumothorax.        Electronically signed by: Esdras Francis  Date:                                            11/10/2020  Time:                                           07:11           Nolan Hernandez MD

## 2020-11-10 NOTE — DISCHARGE INSTRUCTIONS
You will be starting several new medications for your heart and for diabetes:  Atorvastatin (cholesterol)  Carvedilol (heart)  Losartan (heart)  Aspirin (heart)  Plavix (for stent in the hear)  Famotidine (stomach)  Metformin (diabetes)    Because you are taking aspirin and Plavix, please do not take anti-inflammatory medications such as Advil (ibuprofin) or Aleve (Naproxen), as these can cause stomach bleeding in combination.    Please schedule an appointment with a Primary Care Provider soon.  Please schedule an appointment with a Cardiologist soon.      Clopidogrel Bisulfate Oral tablet  What is this medicine?  CLOPIDOGREL (kloh PID oh grel) helps to prevent blood clots. This medicine is used to prevent heart attack, stroke, or other vascular events in people who are at high risk.  How should I use this medicine?  Take this medicine by mouth with a drink of water. Follow the directions on the prescription label. You may take this medicine with or without food. Take your medicine at regular intervals. Do not take your medicine more often than directed.  Talk to your pediatrician regarding the use of this medicine in children. Special care may be needed.  What side effects may I notice from receiving this medicine?  Side effects that you should report to your doctor or health care professional as soon as possible:  · allergic reactions like skin rash, itching or hives, swelling of the face, lips, or tongue  · breathing problems  · changes in vision  · fever  · signs and symptoms of bleeding such as bloody or black, tarry stools; red or dark-brown urine; spitting up blood or brown material that looks like coffee grounds; red spots on the skin; unusual bruising or bleeding from the eye, gums, or nose  · sudden weakness  · unusual bleeding or bruising  Side effects that usually do not require medical attention (report to your doctor or health care professional if they continue or are bothersome):  · constipation or  diarrhea  · headache  · pain in back or joints  · stomach upset  What may interact with this medicine?  · aspirin  · blood thinners like cilostazol, enoxaparin, ticlopidine, and warfarin  · certain medicines for depression like citalopram, fluoxetine, and fluvoxamine  · certain medicines for fungal infections like ketoconazole, fluconazole, and voriconazole  · certain medicines for HIV infection like delavirdine, efavirenz, and etravirine  · certain medicines for seizures like felbamate, oxcarbazepine, and phenytoin  · chloramphenicol  · fluvastatin  · isoniazid, INH  · medicines for inflammation like ibuprofen and naproxen  · modafinil  · nicardipine  · over-the counter supplements like echinacea, feverfew, fish oil, garlic, nannette, ginkgo, green tea, horse chestnut  · quinine  · stomach acid blockers like cimetidine, omeprazole, and esomeprazole  · tamoxifen  · tolbutamide  · topiramate  · torsemide  What if I miss a dose?  If you miss a dose, take it as soon as you can. If it is almost time for your next dose, take only that dose. Do not take double or extra doses.  Where should I keep my medicine?  Keep out of the reach of children.  Store at room temperature of 59 to 86 degrees F (15 to 30 degrees C). Throw away any unused medicine after the expiration date.  What should I tell my health care provider before I take this medicine?  They need to know if you have any of the following conditions:  · bleeding disorder  · bleeding in the brain  · planned surgery  · stomach or intestinal ulcers  · stroke or transient ischemic attack  · an unusual or allergic reaction to clopidogrel, other medicines, foods, dyes, or preservatives  · pregnant or trying to get pregnant  · breast-feeding  What should I watch for while using this medicine?  Visit your doctor or health care professional for regular check ups. Do not stop taking your medicine unless your doctor tells you to.  Notify your doctor or health care professional  and seek emergency treatment if you develop breathing problems; changes in vision; chest pain; severe, sudden headache; pain, swelling, warmth in the leg; trouble speaking; sudden numbness or weakness of the face, arm or leg. These can be signs that your condition has gotten worse.  If you are going to have surgery or dental work, tell your doctor or health care professional that you are taking this medicine.  Certain genetic factors may reduce the effect of this medicine. Your doctor may use genetic tests to determine treatment.  NOTE:This sheet is a summary. It may not cover all possible information. If you have questions about this medicine, talk to your doctor, pharmacist, or health care provider. Copyright© 2017 [employer]      Metformin tablets  What is this medicine?  METFORMIN (met FOR min) is used to treat type 2 diabetes. It helps to control blood sugar. Treatment is combined with diet and exercise. This medicine can be used alone or with other medicines for diabetes.  How should I use this medicine?  Take this medicine by mouth. Take it with meals. Swallow the tablets with a drink of water. Follow the directions on the prescription label. Take your medicine at regular intervals. Do not take your medicine more often than directed.  Talk to your pediatrician regarding the use of this medicine in children. While this drug may be prescribed for children as young as 10 years of age for selected conditions, precautions do apply.  What side effects may I notice from receiving this medicine?  Side effects that you should report to your doctor or health care professional as soon as possible:  · allergic reactions like skin rash, itching or hives, swelling of the face, lips, or tongue  · breathing problems  · feeling faint or lightheaded, falls  · muscle aches or pains  · signs and symptoms of low blood sugar such as feeling anxious, confusion, dizziness, increased hunger, unusually weak or tired, sweating,  shakiness, cold, irritable, headache, blurred vision, fast heartbeat, loss of consciousness  · slow or irregular heartbeat  · unusual stomach pain or discomfort  · unusually tired or weak  Side effects that usually do not require medical attention (report to your doctor or health care professional if they continue or are bothersome):  · diarrhea  · headache  · heartburn  · metallic taste in mouth  · nausea  · stomach gas, upset  What may interact with this medicine?  Do not take this medicine with any of the following medications:  · dofetilide  · gatifloxacin  · certain contrast medicines given before X-rays, CT scans, MRI, or other procedures  This medicine may also interact with the following medications:  · acetazolamide  · certain medicines for HIV infection or hepatitis, like adefovir, emtricitabine, entecavir, lamivudine, or tenofovir  · cimetidine  · crizotinib  · digoxin  · diuretics  · female hormones, like estrogens or progestins and birth control pills  · glycopyrrolate  · isoniazid  · lamotrigine  · medicines for blood pressure, heart disease, irregular heart beat  · memantine  · midodrine  · methazolamide  · morphine  · nicotinic acid  · phenothiazines like chlorpromazine, mesoridazine, prochlorperazine, thioridazine  · phenytoin  · procainamide  · propantheline  · quinidine  · quinine  · ranitidine  · ranolazine  · steroid medicines like prednisone or cortisone  · stimulant medicines for attention disorders, weight loss, or to stay awake  · thyroid medicines  · topiramate  · trimethoprim  · trospium  · vancomycin  · vandetanib  · zonisamide  What if I miss a dose?  If you miss a dose, take it as soon as you can. If it is almost time for your next dose, take only that dose. Do not take double or extra doses.  Where should I keep my medicine?  Keep out of the reach of children.  Store at room temperature between 15 and 30 degrees C (59 and 86 degrees F). Protect from moisture and light. Throw away any  unused medicine after the expiration date.  What should I tell my health care provider before I take this medicine?  They need to know if you have any of these conditions:  · anemia  · frequently drink alcohol-containing beverages  · become easily dehydrated  · heart attack  · heart failure that is treated with medications  · kidney disease  · liver disease  · polycystic ovary syndrome  · serious infection or injury  · vomiting  · an unusual or allergic reaction to metformin, other medicines, foods, dyes, or preservatives  · pregnant or trying to get pregnant  · breast-feeding  What should I watch for while using this medicine?  Visit your doctor or health care professional for regular checks on your progress.  A test called the HbA1C (A1C) will be monitored. This is a simple blood test. It measures your blood sugar control over the last 2 to 3 months. You will receive this test every 3 to 6 months.  Learn how to check your blood sugar. Learn the symptoms of low and high blood sugar and how to manage them.  Always carry a quick-source of sugar with you in case you have symptoms of low blood sugar. Examples include hard sugar candy or glucose tablets. Make sure others know that you can choke if you eat or drink when you develop serious symptoms of low blood sugar, such as seizures or unconsciousness. They must get medical help at once.  Tell your doctor or health care professional if you have high blood sugar. You might need to change the dose of your medicine. If you are sick or exercising more than usual, you might need to change the dose of your medicine.  Do not skip meals. Ask your doctor or health care professional if you should avoid alcohol. Many nonprescription cough and cold products contain sugar or alcohol. These can affect blood sugar.  This medicine may cause ovulation in premenopausal women who do not have regular monthly periods. This may increase your chances of becoming pregnant. You should not take  this medicine if you become pregnant or think you may be pregnant. Talk with your doctor or health care professional about your birth control options while taking this medicine. Contact your doctor or health care professional right away if think you are pregnant.  If you are going to need surgery, a MRI, CT scan, or other procedure, tell your doctor that you are taking this medicine. You may need to stop taking this medicine before the procedure.  Wear a medical ID bracelet or chain, and carry a card that describes your disease and details of your medicine and dosage times.  NOTE:This sheet is a summary. It may not cover all possible information. If you have questions about this medicine, talk to your doctor, pharmacist, or health care provider. Copyright© 2017 [employer]

## 2020-11-10 NOTE — PLAN OF CARE
CM met with pt for final discharge planning assessment. Pt is alert and oriented at time of assessment.    Pt states he will take the bus to [facility] and then ride the streetcar to the bus station.    Pt will take the bus to [facility]. Pt declines  transportation information.    Pt's medications delivered from [facility] prior to discharge.    Pt ready for discharge from  perspective.   11/10/20 1249   Final Note   Assessment Type Final Discharge Note   Anticipated Discharge Disposition Home   What phone number can be called within the next 1-3 days to see how you are doing after discharge? [phone]   Hospital Follow Up  Appt(s) scheduled? Yes   Discharge plans and expectations educations in teach back method with documentation complete? Yes   Right Care Referral Info   Post Acute Recommendation No Care

## 2020-11-10 NOTE — ASSESSMENT & PLAN NOTE
Status post permanent pacemaker yesterday.  Discussed with Dr. Royal Hernandez (Cardiology) who reports patient tolerated procedure well.  OK to discharge home today.

## 2020-11-10 NOTE — ASSESSMENT & PLAN NOTE
Status post [facility] yesterday which revealed significant two vessel disease.  Without symptoms.  Medical therapy advised - ASA/Plavix, Carvedilol, Losartan (given mildly low EF), Atorvastatin.  Needs Cardiology follow up.    TTE on 11/7/2020 - The left ventricle is normal in size with mildly decreased systolic function (40%)  · There is left ventricular concentric hypertrophy.  · Mild left atrial enlargement.  · There is abnormal septal wall motion consistent with right ventricular pacemaker.  Normal right ventricular size with normal right ventricular systolic function.

## 2020-11-10 NOTE — SUBJECTIVE & OBJECTIVE
Interval History:  No acute events overnight. Status post LHC this morning which revealed CAD.  Status post dual chamber permament pacemaker later today.    Review of Systems   Constitutional: Negative for chills and fever.   Respiratory: Negative for shortness of breath.    Cardiovascular: Negative for chest pain.   Gastrointestinal: Negative for abdominal pain, constipation, diarrhea, nausea and vomiting.     Objective:     Vital Signs (Most Recent):  Temp: 98.2 °F (36.8 °C) (11/09/20 1930)  Pulse: 89 (11/09/20 2125)  Resp: 17 (11/09/20 2100)  BP: (!) 153/81 (11/09/20 2125)  SpO2: (!) 94 % (11/09/20 2100) Vital Signs (24h Range):  Temp:  [98 °F (36.7 °C)-98.2 °F (36.8 °C)] 98.2 °F (36.8 °C)  Pulse:  [60-98] 89  Resp:  [16-23] 17  SpO2:  [91 %-95 %] 94 %  BP: (133-171)/() 153/81     Weight: 94.5 kg (208 lb 5.4 oz)  Body mass index is 28.26 kg/m².    Intake/Output Summary (Last 24 hours) at 11/9/2020 2145  Last data filed at 11/9/2020 2136  Gross per 24 hour   Intake 417.5 ml   Output 1855 ml   Net -1437.5 ml      Physical Exam  Constitutional:       Comments: Patient examined after C this morning but prior to pacemaker    Cardiovascular:      Rate and Rhythm: Normal rate and regular rhythm.      Heart sounds: Normal heart sounds. No murmur. No friction rub. No gallop.       Comments: Paced rhythm   Pulmonary:      Effort: Pulmonary effort is normal. No respiratory distress.      Breath sounds: Normal breath sounds. No wheezing or rales.   Abdominal:      General: Bowel sounds are normal. There is no distension.      Palpations: Abdomen is soft.      Tenderness: There is no abdominal tenderness.   Musculoskeletal: Normal range of motion.         General: No tenderness.      Comments: Right radial clear dressing intact without evidence of bleeding.  Good perfusion to the right hand.   Skin:     General: Skin is warm and dry.      Coloration: Skin is not pale.      Findings: No erythema or rash.    Neurological:      Mental Status: He is alert and oriented to person, place, and time.         Significant Labs: All pertinent labs within the past 24 hours have been reviewed.    Significant Imaging: I have reviewed all pertinent imaging results/findings within the past 24 hours.

## 2020-11-11 ENCOUNTER — PATIENT OUTREACH (OUTPATIENT)
Dept: ADMINISTRATIVE | Facility: CLINIC | Age: 65
End: 2020-11-11

## 2020-11-11 NOTE — TELEPHONE ENCOUNTER
C3 nurse attempted to contact patient. No answer. The following message was left for the patient to return the call:  Good afternoon I am a nurse calling on behalf of [facility] from the [facility].  This is a Transitional Care Call for Darren Patel. When you have a moment please contact us at [phone] or [phone] Monday through Friday, between the hours of 8 am to 4 pm. We look forward to speaking with you. On behalf of [facility] have a nice day.    The patient does not have a scheduled HOSFU appointment within 7-14 days post hospital discharge date 11/10/20.

## 2020-11-12 NOTE — PATIENT INSTRUCTIONS
Discharge Instructions for Pacemaker Implantation  You have had a procedure to insert a pacemaker. Once inside your body, this small electronic device helps keep your heart from beating too slowly. A pacemaker cant fix existing heart problems. But it can help you feel better and have more energy. As you recover, follow all of the instructions you are given, including those below.  Activity  · Dont drive until your doctor says its OK. Plan to have someone drive you home after the procedure.  · Follow the instructions you are given about limiting your activity.  · If you are fitted with an arm sling, keep your arm in the sling for as long as your doctor tells you to. Most often, the sling will be removed the following day though you may be instructed to sleep with it on for a period to prevent damage to the pacemaker while it's healing.  · Do not raise your arm on the incision side above shoulder level or stretch your arm behind your back for as long as directed by your doctor. This gives the leads a chance to secure themselves inside your heart.  · Ask your doctor when you can expect to return to work. Depending on the type of work you do, you may have restrictions until your cardiologist clears you for unrestricted activity.  · You can still exercise. Its good for your body and your heart. Talk with your doctor about an exercise plan and the types of exercise to minimize the risk of damaging your pacemaker.  Other precautions  · Follow your doctor's directions carefully for wound care. If there is a dressing, ask whether you should remove it or keep it on until your next visit. Never put any creams, lotions, or products like peroxide on an incision unless your doctor tells you to. Do not get the incision wet until your doctor says it's OK.  · Every day, take your temperature and check your incision for signs of infection (redness, swelling, drainage, or warmth). Do this for 7 days or as advised by your  doctor.  · Learn to take your own pulse. Keep a record of your results. Ask your doctor what pulse rate means you should call for medical attention.  · Before you receive any treatment, tell all healthcare providers (including your dentist) that you have a pacemaker.  · You will be given an ID card that contains information about your pacemaker. Always carry this card with you. You can show this card if your pacemaker sets off a metal detector. You should also show it to avoid screening with a hand-held security wand.  · Keep your cell phone away from your pacemaker. Dont carry the phone in your shirt pocket overlying the pacemaker, even when its turned off.  · Avoid strong magnets. Examples are those used in MRIs or in hand-held security wands. Some pacemakers are now safe to use with MRI scanners. Ask your doctor if you have such a pacemaker.  · Avoid strong electrical fields. Examples are those made by radio transmitting towers, ham radios, and heavy-duty electrical equipment.  · Avoid leaning over the open roach of a running car. A running engine creates an electrical field. Most household and yard appliances will not cause any problems. If you use any large power tools, such as an industrial , talk with your doctor.   Follow-up care  · See your cardiologist in the next 7 to 10 days. Call and make an appointment as soon as you get home.  · Make regular follow-up appointments with your doctor. He or she will check the pacemaker to make sure its working properly.  · Plan on having periodic check-ups with your healthcare provider to evaluate the battery life of your pacemaker. Depending on your device and how much your body uses the pacing functions of the pacemaker, you will need a new device generator implanted at some point, generally about every 5 to 7 years.  · Some pacemakers have a built-in antenna that can transmit information such as trouble alerts over the internet to your doctor. Ask your  doctor if your pacemaker is capable of remote monitoring.  When should I call my healthcare provider?  Call 911 if you have:  · Chest pain  · Severe trouble breathing     Call your healthcare provider right away if you have any of these:  · Dizziness  · Lack of energy  · Fainting spells  · Twitching chest muscles  · Rapid pulse or pounding heartbeat  · Shortness of breath  · Pain around your pacemaker  · Fever above 100.4°F (38°C) or other signs of infection (redness, swelling, drainage, or warmth at the incision site)  · Your incision is not healing or your incision separates or opens  · Hiccups that wont stop  · Redness, severe swelling, drainage, worsening pain, bleeding, or warmth at the incision site  · If your pacemaker generator feels loose or like it is wiggling in the pocket under the skin   Date Last Reviewed: 6/1/2016  © 3848-0502 [employer]. [address] [place] [address], [place] [address]. All rights reserved. This information is not intended as a substitute for professional medical care. Always follow your healthcare professional's instructions.

## 2020-11-28 ENCOUNTER — HOSPITAL ENCOUNTER (EMERGENCY)
Facility: OTHER | Age: 65
Discharge: HOME | End: 2020-11-28
Attending: EMERGENCY MEDICINE

## 2020-11-28 VITALS
HEART RATE: 93 BPM | TEMPERATURE: 98 F | SYSTOLIC BLOOD PRESSURE: 130 MMHG | BODY MASS INDEX: 27.77 KG/M2 | WEIGHT: 205 LBS | HEIGHT: 72 IN | OXYGEN SATURATION: 97 % | RESPIRATION RATE: 16 BRPM | DIASTOLIC BLOOD PRESSURE: 89 MMHG

## 2020-11-28 DIAGNOSIS — Z76.0 ENCOUNTER FOR MEDICATION REFILL: ICD-10-CM

## 2020-11-28 DIAGNOSIS — Z76.0 MEDICATION REFILL: Primary | ICD-10-CM

## 2020-11-28 PROCEDURE — 99283 EMERGENCY DEPT VISIT LOW MDM: CPT

## 2020-11-28 PROCEDURE — 25000003 PHARM REV CODE 250: Performed by: NURSE PRACTITIONER

## 2020-11-28 RX ORDER — LOSARTAN POTASSIUM 50 MG/1
50 TABLET ORAL
Status: COMPLETED | OUTPATIENT
Start: 2020-11-28 | End: 2020-11-28

## 2020-11-28 RX ORDER — LOSARTAN POTASSIUM 50 MG/1
50 TABLET ORAL DAILY
Status: DISCONTINUED | OUTPATIENT
Start: 2020-11-28 | End: 2020-11-28

## 2020-11-28 RX ORDER — LOSARTAN POTASSIUM 50 MG/1
50 TABLET ORAL 2 TIMES DAILY
Qty: 60 TABLET | Refills: 1 | Status: SHIPPED | OUTPATIENT
Start: 2020-11-28 | End: 2020-12-28

## 2020-11-28 RX ADMIN — LOSARTAN POTASSIUM 50 MG: 50 TABLET, FILM COATED ORAL at 01:11

## 2020-11-28 NOTE — DISCHARGE INSTRUCTIONS
You have 1 refill that is still available from your previous prescription.  I did provided with another paper prescription you may bring to any pharmacy.  Make sure to follow-up with the cardiologist on the 10th.

## 2020-11-28 NOTE — ED PROVIDER NOTES
Encounter Date: 11/28/2020       History     Chief Complaint   Patient presents with    Medication Refill     Losartan 50mg BID     This is the urgent evaluation a 65-year-old white male with a past medical history of diabetes, high cholesterol hypertension and pacemaker who presents emergency department after having complete heart block and needing a permanent pacemaker paced.  He was discharged on losartan and Coreg, presents emergency department today requesting a refill of his losartan.  Patient states that he ran out 2 days ago.  States that they told him we would having month supply was only given 15 days.  He denies any headaches, shortness of breath, chest pain or any blurry vision.  Blood pressure is within normal limits upon arrival to the emergency department.        Review of patient's allergies indicates:  No Known Allergies  Past Medical History:   Diagnosis Date    Diabetes mellitus     High cholesterol     Hypertension      Past Surgical History:   Procedure Laterality Date    A-V CARDIAC PACEMAKER INSERTION Left 11/9/2020    Procedure: INSERTION, CARDIAC PACEMAKER, DUAL CHAMBER;  Surgeon: Nolan Hernandez MD;  Location: [facility] CATH LAB;  Service: Cardiology;  Laterality: Left;    CORONARY ANGIOGRAPHY N/A 11/9/2020    Procedure: ANGIOGRAM, CORONARY ARTERY - right radial;  Surgeon: Nolan Hernandez MD;  Location: [facility] CATH LAB;  Service: Cardiology;  Laterality: N/A;     Family History   Family history unknown: Yes     Social History     Tobacco Use    Smoking status: Current Some Day Smoker    Smokeless tobacco: Never Used   Substance Use Topics    Alcohol use: Yes     Comment: occasionally    Drug use: Not Currently     Review of Systems   Constitutional: Negative for chills and fever.   Respiratory: Negative for cough and shortness of breath.    Cardiovascular: Negative for chest pain.   Gastrointestinal: Negative for abdominal pain.   Musculoskeletal: Negative for gait problem and neck pain.    Neurological: Negative for headaches.   All other systems reviewed and are negative.      Physical Exam     Initial Vitals [11/28/20 1323]   BP Pulse Resp Temp SpO2   130/89 93 16 97.8 °F (36.6 °C) 97 %      MAP       --         Physical Exam    Nursing note and vitals reviewed.  Constitutional: Vital signs are normal. He appears well-developed and well-nourished. He does not appear ill. No distress.   HENT:   Head: Normocephalic and atraumatic.   Eyes: Conjunctivae and EOM are normal. Pupils are equal, round, and reactive to light.   Neck: Neck supple.   Cardiovascular: Normal rate, regular rhythm, S1 normal, S2 normal and normal heart sounds. Exam reveals no gallop.    No murmur heard.  Pulses:       Radial pulses are 2+ on the right side and 2+ on the left side.   Pacemaker   Pulmonary/Chest: Effort normal and breath sounds normal. No tachypnea. He has no decreased breath sounds. He has no wheezes.   Neurological: He is alert and oriented to person, place, and time. GCS eye subscore is 4. GCS verbal subscore is 5. GCS motor subscore is 6.   Skin: Skin is warm, dry and intact.         ED Course   Procedures  Labs Reviewed - No data to display       Imaging Results    None          Medical Decision Making:   Initial Assessment:   65-year-old male presents emergency department requesting a refill of his losartan, was recently discharged after having complete heart block and pacemaker placed.  He has no complaints at this time, denies any headache, chest pain, shortness of breath or any blurry vision.  ED Management:  Patient has no complaints while emergency department today, chart review revealed patient was discharged home with 15 day supply of losartan.  His follow-up with cardiologist not to the 10th of December.  Patient is unaware he had 1 refill on the previous prescription, discussed again that refilled I will also send him home with a paper prescription for another 30 days supply.  Discussed with the  patient the need to follow up with his cardiologist on the 10th.                             Clinical Impression:       ICD-10-CM ICD-9-CM   1. Medication refill  Z76.0 V68.1   2. Encounter for medication refill  Z76.0 V68.1                      Disposition:   Disposition: Discharged  Condition: Stable     ED Disposition Condition    Discharge Stable        ED Prescriptions     Medication Sig Dispense Start Date End Date Auth. Provider    losartan (COZAAR) 50 MG tablet Take 1 tablet (50 mg total) by mouth 2 (two) times a day. 60 tablet 11/28/2020 12/28/2020 ZEYNEP Mccray        Follow-up Information     Follow up With Specialties Details Why Contact Info    [facility] [address] [place] [address]  [phone]                                         ZEYNEP Mccray  11/28/20 1424

## 2020-12-01 ENCOUNTER — TELEPHONE (OUTPATIENT)
Dept: CARDIOLOGY | Facility: CLINIC | Age: 65
End: 2020-12-01

## 2020-12-07 ENCOUNTER — TELEPHONE (OUTPATIENT)
Dept: CARDIOLOGY | Facility: CLINIC | Age: 65
End: 2020-12-07

## 2020-12-07 NOTE — TELEPHONE ENCOUNTER
Spoke w/pt who has appt to [facility] care w/cardiologist 12/10/20; informed to have the office contact us for merlin transfer, he verbalized understanding.

## 2020-12-18 NOTE — Clinical Note
How much of a vitamin to take  And get a mammogram order.    Call her at [phone]       Verbal communication is authorized for caller: Yes        It is okay to leave a a detailed message on their voicemail if needed.    Current Outpatient Medications   Medication Sig Dispense Refill   • TEMazepam (RESTORIL) 7.5 MG capsule Take 1 capsule by mouth nightly as needed for Sleep. 30 capsule 0   • hydrOXYzine (ATARAX) 25 MG tablet Take 1 tablet by mouth every 8 hours as needed for Anxiety. 30 tablet 1   • ondansetron (ZOFRAN) 4 MG tablet Take 1 tablet by mouth every 8 hours as needed for Nausea. 15 tablet 0   • cetirizine (ZYRTEC) 10 MG tablet Take 1 tablet by mouth daily. 30 tablet 3   • escitalopram (LEXAPRO) 5 MG tablet Take 1 tablet by mouth daily. 30 tablet 2   • famotidine (PEPCID) 20 MG tablet Take 1 tablet by mouth 2 times daily as needed (Heartburn). 180 tablet 1   • Magnesium 100 MG Cap      • B Complex Vitamins (VITAMIN B COMPLEX PO)      • clonazePAM (KLONOPIN) 0.5 MG tablet Take 1 tablet by mouth 2 times daily as needed for Anxiety. 60 tablet 0     No current facility-administered medications for this visit.        The sheath is inserted into the left subclavian vein.

## 2021-02-10 ENCOUNTER — OFFICE VISIT (OUTPATIENT)
Dept: CARDIOLOGY | Facility: CLINIC | Age: 66
End: 2021-02-10

## 2021-02-10 DIAGNOSIS — Z95.0 CARDIAC PACEMAKER IN SITU: ICD-10-CM

## 2021-02-10 PROCEDURE — 93294 PR INTERROG EVAL, REMOTE, UP TO 90 DAYS,PACEMAKER: ICD-10-PCS | Mod: ,,, | Performed by: INTERNAL MEDICINE

## 2021-02-10 PROCEDURE — 93296 REM INTERROG EVL PM/IDS: CPT | Mod: ,,, | Performed by: INTERNAL MEDICINE

## 2021-02-10 PROCEDURE — 99024 PR POST-OP FOLLOW-UP VISIT: ICD-10-PCS | Mod: ,,, | Performed by: INTERNAL MEDICINE

## 2021-02-10 PROCEDURE — 93294 REM INTERROG EVL PM/LDLS PM: CPT | Mod: ,,, | Performed by: INTERNAL MEDICINE

## 2021-02-10 PROCEDURE — 99024 POSTOP FOLLOW-UP VISIT: CPT | Mod: ,,, | Performed by: INTERNAL MEDICINE

## 2021-02-10 PROCEDURE — 99211 OFF/OP EST MAY X REQ PHY/QHP: CPT | Mod: PBBFAC | Performed by: INTERNAL MEDICINE

## 2021-02-10 PROCEDURE — 99999 PR PBB SHADOW E&M-EST. PATIENT-LVL I: ICD-10-PCS | Mod: PBBFAC,,, | Performed by: INTERNAL MEDICINE

## 2021-02-10 PROCEDURE — 93296 PR INTERROG EVAL, REMOTE, UP TO 90 DAYS, PACER/DEFIB,TECH REVIEW: ICD-10-PCS | Mod: ,,, | Performed by: INTERNAL MEDICINE

## 2021-02-10 PROCEDURE — 99999 PR PBB SHADOW E&M-EST. PATIENT-LVL I: CPT | Mod: PBBFAC,,, | Performed by: INTERNAL MEDICINE

## 2021-02-22 PROBLEM — Z95.0 CARDIAC PACEMAKER IN SITU: Status: ACTIVE | Noted: 2021-02-22

## 2021-03-10 ENCOUNTER — HISTORICAL (OUTPATIENT)
Dept: RADIOLOGY | Facility: HOSPITAL | Age: 66
End: 2021-03-10

## 2021-03-23 ENCOUNTER — HISTORICAL (OUTPATIENT)
Dept: ADMINISTRATIVE | Facility: HOSPITAL | Age: 66
End: 2021-03-23

## 2021-03-23 LAB
ABS NEUT (OLG): 4.67 X10(3)/MCL (ref 2.1–9.2)
APTT PPP: 29.3 SECOND(S) (ref 23.3–37)
BASOPHILS # BLD AUTO: 0.1 X10(3)/MCL (ref 0–0.2)
BASOPHILS NFR BLD AUTO: 1 %
BUN SERPL-MCNC: 16.9 MG/DL (ref 8.4–25.7)
BUN SERPL-MCNC: 17.6 MG/DL (ref 8.4–25.7)
CALCIUM SERPL-MCNC: 9.4 MG/DL (ref 8.8–10)
CALCIUM SERPL-MCNC: 9.6 MG/DL (ref 8.8–10)
CHLORIDE SERPL-SCNC: 104 MMOL/L (ref 98–107)
CHLORIDE SERPL-SCNC: 104 MMOL/L (ref 98–107)
CO2 SERPL-SCNC: 26 MMOL/L (ref 23–31)
CO2 SERPL-SCNC: 28 MMOL/L (ref 23–31)
CREAT SERPL-MCNC: 0.84 MG/DL (ref 0.73–1.18)
CREAT SERPL-MCNC: 0.87 MG/DL (ref 0.73–1.18)
CREAT/UREA NIT SERPL: 19
CREAT/UREA NIT SERPL: 21
EOSINOPHIL # BLD AUTO: 0.3 X10(3)/MCL (ref 0–0.9)
EOSINOPHIL NFR BLD AUTO: 4 %
ERYTHROCYTE [DISTWIDTH] IN BLOOD BY AUTOMATED COUNT: 17.1 % (ref 11.5–14.5)
EST. AVERAGE GLUCOSE BLD GHB EST-MCNC: 154.2 MG/DL
GLUCOSE SERPL-MCNC: 131 MG/DL (ref 82–115)
GLUCOSE SERPL-MCNC: 196 MG/DL (ref 82–115)
HBA1C MFR BLD: 7 %
HCT VFR BLD AUTO: 36.2 % (ref 40–51)
HGB BLD-MCNC: 11.3 GM/DL (ref 13.5–17.5)
IMM GRANULOCYTES # BLD AUTO: 0.03 10*3/UL
IMM GRANULOCYTES NFR BLD AUTO: 0 %
INR PPP: 1.03 (ref 0.9–1.2)
LYMPHOCYTES # BLD AUTO: 2.3 X10(3)/MCL (ref 0.6–4.6)
LYMPHOCYTES NFR BLD AUTO: 29 %
MCH RBC QN AUTO: 30.6 PG (ref 26–34)
MCHC RBC AUTO-ENTMCNC: 31.2 GM/DL (ref 31–37)
MCV RBC AUTO: 98.1 FL (ref 80–100)
MONOCYTES # BLD AUTO: 0.6 X10(3)/MCL (ref 0.1–1.3)
MONOCYTES NFR BLD AUTO: 7 %
NEUTROPHILS # BLD AUTO: 4.67 X10(3)/MCL (ref 2.1–9.2)
NEUTROPHILS NFR BLD AUTO: 59 %
PLATELET # BLD AUTO: 200 X10(3)/MCL (ref 130–400)
PMV BLD AUTO: 10.4 FL (ref 7.4–10.4)
POTASSIUM SERPL-SCNC: 4.7 MMOL/L (ref 3.5–5.1)
POTASSIUM SERPL-SCNC: 4.7 MMOL/L (ref 3.5–5.1)
PROTHROMBIN TIME: 13.1 SECOND(S) (ref 11.9–14.4)
RBC # BLD AUTO: 3.69 X10(6)/MCL (ref 4.5–5.9)
SODIUM SERPL-SCNC: 138 MMOL/L (ref 136–145)
SODIUM SERPL-SCNC: 139 MMOL/L (ref 136–145)
WBC # SPEC AUTO: 7.9 X10(3)/MCL (ref 4.5–11)

## 2021-04-07 ENCOUNTER — HISTORICAL (OUTPATIENT)
Dept: CARDIOLOGY | Facility: HOSPITAL | Age: 66
End: 2021-04-07

## 2021-04-28 ENCOUNTER — PATIENT MESSAGE (OUTPATIENT)
Dept: RESEARCH | Facility: HOSPITAL | Age: 66
End: 2021-04-28

## 2021-05-10 ENCOUNTER — HISTORICAL (OUTPATIENT)
Dept: ADMINISTRATIVE | Facility: HOSPITAL | Age: 66
End: 2021-05-10

## 2021-05-10 LAB
ABS NEUT (OLG): 4.66 X10(3)/MCL (ref 2.1–9.2)
ALBUMIN SERPL-MCNC: 4.1 GM/DL (ref 3.4–4.8)
ALBUMIN/GLOB SERPL: 1.2 RATIO (ref 1.1–2)
ALP SERPL-CCNC: 72 UNIT/L (ref 40–150)
ALT SERPL-CCNC: 19 UNIT/L (ref 0–55)
APTT PPP: 29.6 SECOND(S) (ref 23.3–37)
AST SERPL-CCNC: 13 UNIT/L (ref 5–34)
BASOPHILS # BLD AUTO: 0 X10(3)/MCL (ref 0–0.2)
BASOPHILS NFR BLD AUTO: 0 %
BILIRUB SERPL-MCNC: 0.7 MG/DL
BILIRUBIN DIRECT+TOT PNL SERPL-MCNC: 0.3 MG/DL (ref 0–0.5)
BILIRUBIN DIRECT+TOT PNL SERPL-MCNC: 0.4 MG/DL (ref 0–0.8)
BUN SERPL-MCNC: 19.4 MG/DL (ref 8.4–25.7)
CALCIUM SERPL-MCNC: 10.2 MG/DL (ref 8.8–10)
CHLORIDE SERPL-SCNC: 107 MMOL/L (ref 98–107)
CO2 SERPL-SCNC: 25 MMOL/L (ref 23–31)
CREAT SERPL-MCNC: 0.94 MG/DL (ref 0.73–1.18)
EOSINOPHIL # BLD AUTO: 0.2 X10(3)/MCL (ref 0–0.9)
EOSINOPHIL NFR BLD AUTO: 2 %
ERYTHROCYTE [DISTWIDTH] IN BLOOD BY AUTOMATED COUNT: 16.1 % (ref 11.5–14.5)
GLOBULIN SER-MCNC: 3.4 GM/DL (ref 2.4–3.5)
GLUCOSE SERPL-MCNC: 131 MG/DL (ref 82–115)
HCT VFR BLD AUTO: 38.1 % (ref 40–51)
HGB BLD-MCNC: 11.8 GM/DL (ref 13.5–17.5)
IMM GRANULOCYTES # BLD AUTO: 0.03 10*3/UL
IMM GRANULOCYTES NFR BLD AUTO: 0 %
INR PPP: 1.02 (ref 0.9–1.2)
LYMPHOCYTES # BLD AUTO: 2.5 X10(3)/MCL (ref 0.6–4.6)
LYMPHOCYTES NFR BLD AUTO: 31 %
MCH RBC QN AUTO: 30.6 PG (ref 26–34)
MCHC RBC AUTO-ENTMCNC: 31 GM/DL (ref 31–37)
MCV RBC AUTO: 99 FL (ref 80–100)
MONOCYTES # BLD AUTO: 0.6 X10(3)/MCL (ref 0.1–1.3)
MONOCYTES NFR BLD AUTO: 8 %
NEUTROPHILS # BLD AUTO: 4.66 X10(3)/MCL (ref 2.1–9.2)
NEUTROPHILS NFR BLD AUTO: 58 %
PLATELET # BLD AUTO: 204 X10(3)/MCL (ref 130–400)
PMV BLD AUTO: 10.4 FL (ref 7.4–10.4)
POTASSIUM SERPL-SCNC: 5.2 MMOL/L (ref 3.5–5.1)
PROT SERPL-MCNC: 7.5 GM/DL (ref 5.8–7.6)
PROTHROMBIN TIME: 13 SECOND(S) (ref 11.9–14.4)
RBC # BLD AUTO: 3.85 X10(6)/MCL (ref 4.5–5.9)
SARS-COV-2 RNA RESP QL NAA+PROBE: NOT DETECTED
SODIUM SERPL-SCNC: 141 MMOL/L (ref 136–145)
WBC # SPEC AUTO: 8 X10(3)/MCL (ref 4.5–11)

## 2021-06-01 ENCOUNTER — OFFICE VISIT (OUTPATIENT)
Dept: CARDIOLOGY | Facility: CLINIC | Age: 66
End: 2021-06-01

## 2021-06-01 DIAGNOSIS — Z95.0 CARDIAC PACEMAKER IN SITU: Primary | ICD-10-CM

## 2021-06-01 PROCEDURE — 93296 PR INTERROG EVAL, REMOTE, UP TO 90 DAYS, PACER/DEFIB,TECH REVIEW: ICD-10-PCS | Mod: ,,, | Performed by: INTERNAL MEDICINE

## 2021-06-01 PROCEDURE — 99999 PR PBB SHADOW E&M-EST. PATIENT-LVL I: CPT | Mod: PBBFAC,,, | Performed by: INTERNAL MEDICINE

## 2021-06-01 PROCEDURE — 99211 OFF/OP EST MAY X REQ PHY/QHP: CPT | Mod: PBBFAC | Performed by: INTERNAL MEDICINE

## 2021-06-01 PROCEDURE — 99024 PR POST-OP FOLLOW-UP VISIT: ICD-10-PCS | Mod: ,,, | Performed by: INTERNAL MEDICINE

## 2021-06-01 PROCEDURE — 93294 PR INTERROG EVAL, REMOTE, UP TO 90 DAYS,PACEMAKER: ICD-10-PCS | Mod: ,,, | Performed by: INTERNAL MEDICINE

## 2021-06-01 PROCEDURE — 99999 PR PBB SHADOW E&M-EST. PATIENT-LVL I: ICD-10-PCS | Mod: PBBFAC,,, | Performed by: INTERNAL MEDICINE

## 2021-06-01 PROCEDURE — 93296 REM INTERROG EVL PM/IDS: CPT | Mod: ,,, | Performed by: INTERNAL MEDICINE

## 2021-06-01 PROCEDURE — 99024 POSTOP FOLLOW-UP VISIT: CPT | Mod: ,,, | Performed by: INTERNAL MEDICINE

## 2021-06-01 PROCEDURE — 93294 REM INTERROG EVL PM/LDLS PM: CPT | Mod: ,,, | Performed by: INTERNAL MEDICINE

## 2021-06-02 ENCOUNTER — TELEPHONE (OUTPATIENT)
Dept: CARDIOLOGY | Facility: CLINIC | Age: 66
End: 2021-06-02

## 2021-06-24 ENCOUNTER — HISTORICAL (OUTPATIENT)
Dept: INTERNAL MEDICINE | Facility: CLINIC | Age: 66
End: 2021-06-24

## 2021-06-24 LAB
ABS NEUT (OLG): 4.55 X10(3)/MCL (ref 2.1–9.2)
ALBUMIN SERPL-MCNC: 4.2 GM/DL (ref 3.4–4.8)
ALBUMIN/GLOB SERPL: 1.2 RATIO (ref 1.1–2)
ALP SERPL-CCNC: 71 UNIT/L (ref 40–150)
ALT SERPL-CCNC: 14 UNIT/L (ref 0–55)
AST SERPL-CCNC: 15 UNIT/L (ref 5–34)
BASOPHILS # BLD AUTO: 0.1 X10(3)/MCL (ref 0–0.2)
BASOPHILS NFR BLD AUTO: 1 %
BILIRUB SERPL-MCNC: 0.7 MG/DL
BILIRUBIN DIRECT+TOT PNL SERPL-MCNC: 0.3 MG/DL (ref 0–0.5)
BILIRUBIN DIRECT+TOT PNL SERPL-MCNC: 0.4 MG/DL (ref 0–0.8)
BUN SERPL-MCNC: 25.6 MG/DL (ref 8.4–25.7)
CALCIUM SERPL-MCNC: 10.3 MG/DL (ref 8.8–10)
CHLORIDE SERPL-SCNC: 107 MMOL/L (ref 98–107)
CO2 SERPL-SCNC: 25 MMOL/L (ref 23–31)
CREAT SERPL-MCNC: 1.09 MG/DL (ref 0.73–1.18)
EOSINOPHIL # BLD AUTO: 0.2 X10(3)/MCL (ref 0–0.9)
EOSINOPHIL NFR BLD AUTO: 2 %
ERYTHROCYTE [DISTWIDTH] IN BLOOD BY AUTOMATED COUNT: 16.6 % (ref 11.5–14.5)
EST. AVERAGE GLUCOSE BLD GHB EST-MCNC: 142.7 MG/DL
GLOBULIN SER-MCNC: 3.4 GM/DL (ref 2.4–3.5)
GLUCOSE SERPL-MCNC: 160 MG/DL (ref 82–115)
HBA1C MFR BLD: 6.6 %
HCT VFR BLD AUTO: 34.2 % (ref 40–51)
HGB BLD-MCNC: 10.7 GM/DL (ref 13.5–17.5)
IMM GRANULOCYTES # BLD AUTO: 0.02 10*3/UL
IMM GRANULOCYTES NFR BLD AUTO: 0 %
LYMPHOCYTES # BLD AUTO: 2.5 X10(3)/MCL (ref 0.6–4.6)
LYMPHOCYTES NFR BLD AUTO: 31 %
MCH RBC QN AUTO: 31.6 PG (ref 26–34)
MCHC RBC AUTO-ENTMCNC: 31.3 GM/DL (ref 31–37)
MCV RBC AUTO: 100.9 FL (ref 80–100)
MONOCYTES # BLD AUTO: 0.7 X10(3)/MCL (ref 0.1–1.3)
MONOCYTES NFR BLD AUTO: 8 %
NEUTROPHILS # BLD AUTO: 4.55 X10(3)/MCL (ref 2.1–9.2)
NEUTROPHILS NFR BLD AUTO: 57 %
NRBC BLD AUTO-RTO: 0 % (ref 0–0.2)
PLATELET # BLD AUTO: 226 X10(3)/MCL (ref 130–400)
PMV BLD AUTO: 10.2 FL (ref 7.4–10.4)
POTASSIUM SERPL-SCNC: 4.6 MMOL/L (ref 3.5–5.1)
PROT SERPL-MCNC: 7.6 GM/DL (ref 5.8–7.6)
PSA SERPL-MCNC: 3.19 NG/ML
RBC # BLD AUTO: 3.39 X10(6)/MCL (ref 4.5–5.9)
SODIUM SERPL-SCNC: 143 MMOL/L (ref 136–145)
WBC # SPEC AUTO: 8 X10(3)/MCL (ref 4.5–11)

## 2021-10-20 ENCOUNTER — HISTORICAL (OUTPATIENT)
Dept: RADIOLOGY | Facility: HOSPITAL | Age: 66
End: 2021-10-20

## 2021-11-08 ENCOUNTER — HISTORICAL (OUTPATIENT)
Dept: INTERNAL MEDICINE | Facility: CLINIC | Age: 66
End: 2021-11-08

## 2021-11-08 LAB
ABS NEUT (OLG): 4.79 X10(3)/MCL (ref 2.1–9.2)
ALBUMIN SERPL-MCNC: 4.4 GM/DL (ref 3.4–4.8)
ALBUMIN/GLOB SERPL: 1.2 RATIO (ref 1.1–2)
ALP SERPL-CCNC: 70 UNIT/L (ref 40–150)
ALT SERPL-CCNC: 20 UNIT/L (ref 0–55)
AST SERPL-CCNC: 16 UNIT/L (ref 5–34)
BASOPHILS # BLD AUTO: 0.1 X10(3)/MCL (ref 0–0.2)
BASOPHILS NFR BLD AUTO: 1 %
BILIRUB SERPL-MCNC: 0.8 MG/DL
BILIRUBIN DIRECT+TOT PNL SERPL-MCNC: 0.4 MG/DL (ref 0–0.5)
BILIRUBIN DIRECT+TOT PNL SERPL-MCNC: 0.4 MG/DL (ref 0–0.8)
BUN SERPL-MCNC: 21.8 MG/DL (ref 8.4–25.7)
CALCIUM SERPL-MCNC: 11 MG/DL (ref 8.7–10.5)
CHLORIDE SERPL-SCNC: 107 MMOL/L (ref 98–107)
CHOLEST SERPL-MCNC: 110 MG/DL
CHOLEST/HDLC SERPL: 4 {RATIO} (ref 0–5)
CO2 SERPL-SCNC: 26 MMOL/L (ref 23–31)
CREAT SERPL-MCNC: 1.03 MG/DL (ref 0.73–1.18)
EOSINOPHIL # BLD AUTO: 0.2 X10(3)/MCL (ref 0–0.9)
EOSINOPHIL NFR BLD AUTO: 2 %
ERYTHROCYTE [DISTWIDTH] IN BLOOD BY AUTOMATED COUNT: 16.3 % (ref 11.5–14.5)
EST. AVERAGE GLUCOSE BLD GHB EST-MCNC: 177.2 MG/DL
GLOBULIN SER-MCNC: 3.7 GM/DL (ref 2.4–3.5)
GLUCOSE SERPL-MCNC: 196 MG/DL (ref 82–115)
HBA1C MFR BLD: 7.8 %
HCT VFR BLD AUTO: 40.7 % (ref 40–51)
HDLC SERPL-MCNC: 25 MG/DL (ref 35–60)
HGB BLD-MCNC: 12.8 GM/DL (ref 13.5–17.5)
IMM GRANULOCYTES # BLD AUTO: 0.05 10*3/UL
IMM GRANULOCYTES NFR BLD AUTO: 1 %
LDLC SERPL CALC-MCNC: 61 MG/DL (ref 50–140)
LYMPHOCYTES # BLD AUTO: 2.4 X10(3)/MCL (ref 0.6–4.6)
LYMPHOCYTES NFR BLD AUTO: 30 %
MCH RBC QN AUTO: 31.2 PG (ref 26–34)
MCHC RBC AUTO-ENTMCNC: 31.4 GM/DL (ref 31–37)
MCV RBC AUTO: 99.3 FL (ref 80–100)
MONOCYTES # BLD AUTO: 0.6 X10(3)/MCL (ref 0.1–1.3)
MONOCYTES NFR BLD AUTO: 7 %
NEUTROPHILS # BLD AUTO: 4.79 X10(3)/MCL (ref 2.1–9.2)
NEUTROPHILS NFR BLD AUTO: 59 %
NRBC BLD AUTO-RTO: 0 % (ref 0–0.2)
PLATELET # BLD AUTO: 202 X10(3)/MCL (ref 130–400)
PMV BLD AUTO: 9.7 FL (ref 7.4–10.4)
POTASSIUM SERPL-SCNC: 5.5 MMOL/L (ref 3.5–5.1)
PROT SERPL-MCNC: 8.1 GM/DL (ref 5.8–7.6)
RBC # BLD AUTO: 4.1 X10(6)/MCL (ref 4.5–5.9)
SODIUM SERPL-SCNC: 140 MMOL/L (ref 136–145)
TRIGL SERPL-MCNC: 118 MG/DL (ref 34–140)
TSH SERPL-ACNC: 3.21 UIU/ML (ref 0.35–4.94)
VLDLC SERPL CALC-MCNC: 24 MG/DL
WBC # SPEC AUTO: 8.1 X10(3)/MCL (ref 4.5–11)

## 2021-11-15 ENCOUNTER — HISTORICAL (OUTPATIENT)
Dept: ADMINISTRATIVE | Facility: HOSPITAL | Age: 66
End: 2021-11-15

## 2021-11-15 LAB
BUN SERPL-MCNC: 15.3 MG/DL (ref 8.4–25.7)
CALCIUM SERPL-MCNC: 10.4 MG/DL (ref 8.7–10.5)
CHLORIDE SERPL-SCNC: 106 MMOL/L (ref 98–107)
CO2 SERPL-SCNC: 27 MMOL/L (ref 23–31)
CREAT SERPL-MCNC: 1.04 MG/DL (ref 0.73–1.18)
CREAT/UREA NIT SERPL: 15
GLUCOSE SERPL-MCNC: 155 MG/DL (ref 82–115)
POTASSIUM SERPL-SCNC: 5.4 MMOL/L (ref 3.5–5.1)
SODIUM SERPL-SCNC: 140 MMOL/L (ref 136–145)

## 2021-11-30 ENCOUNTER — HISTORICAL (OUTPATIENT)
Dept: RADIOLOGY | Facility: HOSPITAL | Age: 66
End: 2021-11-30

## 2021-12-08 ENCOUNTER — CLINICAL SUPPORT (OUTPATIENT)
Dept: CARDIOLOGY | Facility: CLINIC | Age: 66
End: 2021-12-08
Payer: MEDICARE

## 2021-12-08 DIAGNOSIS — Z95.0 CARDIAC PACEMAKER IN SITU: Primary | ICD-10-CM

## 2021-12-08 PROCEDURE — 93294 PR INTERROG EVAL, REMOTE, UP TO 90 DAYS,PACEMAKER: ICD-10-PCS | Mod: ,,, | Performed by: INTERNAL MEDICINE

## 2021-12-08 PROCEDURE — 93296 PR INTERROG EVAL, REMOTE, UP TO 90 DAYS, PACER/DEFIB,TECH REVIEW: ICD-10-PCS | Mod: ,,, | Performed by: INTERNAL MEDICINE

## 2021-12-08 PROCEDURE — 99024 POSTOP FOLLOW-UP VISIT: CPT | Mod: ,,, | Performed by: INTERNAL MEDICINE

## 2021-12-08 PROCEDURE — 93294 REM INTERROG EVL PM/LDLS PM: CPT | Mod: ,,, | Performed by: INTERNAL MEDICINE

## 2021-12-08 PROCEDURE — 99024 PR POST-OP FOLLOW-UP VISIT: ICD-10-PCS | Mod: ,,, | Performed by: INTERNAL MEDICINE

## 2021-12-08 PROCEDURE — 93296 REM INTERROG EVL PM/IDS: CPT | Mod: ,,, | Performed by: INTERNAL MEDICINE

## 2021-12-09 ENCOUNTER — HISTORICAL (OUTPATIENT)
Dept: ADMINISTRATIVE | Facility: HOSPITAL | Age: 66
End: 2021-12-09

## 2021-12-09 LAB
ABS NEUT (OLG): 4.72 X10(3)/MCL (ref 2.1–9.2)
APTT PPP: 32.6 SECOND(S) (ref 23.3–37)
BASOPHILS # BLD AUTO: 0 X10(3)/MCL (ref 0–0.2)
BASOPHILS NFR BLD AUTO: 1 %
BUN SERPL-MCNC: 22.8 MG/DL (ref 8.4–25.7)
CALCIUM SERPL-MCNC: 10.3 MG/DL (ref 8.7–10.5)
CHLORIDE SERPL-SCNC: 106 MMOL/L (ref 98–107)
CO2 SERPL-SCNC: 26 MMOL/L (ref 23–31)
CREAT SERPL-MCNC: 0.97 MG/DL (ref 0.73–1.18)
CREAT/UREA NIT SERPL: 24
EOSINOPHIL # BLD AUTO: 0.2 X10(3)/MCL (ref 0–0.9)
EOSINOPHIL NFR BLD AUTO: 2 %
ERYTHROCYTE [DISTWIDTH] IN BLOOD BY AUTOMATED COUNT: 14.7 % (ref 11.5–14.5)
GLUCOSE SERPL-MCNC: 138 MG/DL (ref 82–115)
HCT VFR BLD AUTO: 39 % (ref 40–51)
HGB BLD-MCNC: 12.6 GM/DL (ref 13.5–17.5)
IMM GRANULOCYTES # BLD AUTO: 0.03 10*3/UL
IMM GRANULOCYTES NFR BLD AUTO: 0 %
INR PPP: 1.09 (ref 0.9–1.2)
LYMPHOCYTES # BLD AUTO: 2.8 X10(3)/MCL (ref 0.6–4.6)
LYMPHOCYTES NFR BLD AUTO: 33 %
MCH RBC QN AUTO: 31.8 PG (ref 26–34)
MCHC RBC AUTO-ENTMCNC: 32.3 GM/DL (ref 31–37)
MCV RBC AUTO: 98.5 FL (ref 80–100)
MONOCYTES # BLD AUTO: 0.7 X10(3)/MCL (ref 0.1–1.3)
MONOCYTES NFR BLD AUTO: 8 %
NEUTROPHILS # BLD AUTO: 4.72 X10(3)/MCL (ref 2.1–9.2)
NEUTROPHILS NFR BLD AUTO: 56 %
NRBC BLD AUTO-RTO: 0 % (ref 0–0.2)
PLATELET # BLD AUTO: 197 X10(3)/MCL (ref 130–400)
PMV BLD AUTO: 10.5 FL (ref 7.4–10.4)
POTASSIUM SERPL-SCNC: 5.5 MMOL/L (ref 3.5–5.1)
PROTHROMBIN TIME: 13.9 SECOND(S) (ref 11.9–14.4)
RBC # BLD AUTO: 3.96 X10(6)/MCL (ref 4.5–5.9)
SODIUM SERPL-SCNC: 140 MMOL/L (ref 136–145)
WBC # SPEC AUTO: 8.4 X10(3)/MCL (ref 4.5–11)

## 2021-12-16 ENCOUNTER — HOSPITAL ENCOUNTER (OUTPATIENT)
Dept: MEDSURG UNIT | Facility: HOSPITAL | Age: 66
End: 2021-12-17
Attending: INTERNAL MEDICINE | Admitting: NURSE PRACTITIONER

## 2021-12-16 LAB
ABS NEUT (OLG): 6.08 X10(3)/MCL (ref 2.1–9.2)
ALBUMIN SERPL-MCNC: 3.9 GM/DL (ref 3.4–4.8)
ALBUMIN/GLOB SERPL: 1.3 RATIO (ref 1.1–2)
ALP SERPL-CCNC: 57 UNIT/L (ref 40–150)
ALT SERPL-CCNC: 22 UNIT/L (ref 0–55)
APTT PPP: 29.4 SECOND(S) (ref 23.3–37)
AST SERPL-CCNC: 19 UNIT/L (ref 5–34)
BASOPHILS # BLD AUTO: 0 X10(3)/MCL (ref 0–0.2)
BASOPHILS NFR BLD AUTO: 0 %
BILIRUB SERPL-MCNC: 0.7 MG/DL
BILIRUBIN DIRECT+TOT PNL SERPL-MCNC: 0.3 MG/DL (ref 0–0.5)
BILIRUBIN DIRECT+TOT PNL SERPL-MCNC: 0.4 MG/DL (ref 0–0.8)
BUN SERPL-MCNC: 19.4 MG/DL (ref 8.4–25.7)
CALCIUM SERPL-MCNC: 9.9 MG/DL (ref 8.7–10.5)
CHLORIDE SERPL-SCNC: 104 MMOL/L (ref 98–107)
CO2 SERPL-SCNC: 29 MMOL/L (ref 23–31)
CREAT SERPL-MCNC: 0.94 MG/DL (ref 0.73–1.18)
EOSINOPHIL # BLD AUTO: 0.1 X10(3)/MCL (ref 0–0.9)
EOSINOPHIL NFR BLD AUTO: 1 %
ERYTHROCYTE [DISTWIDTH] IN BLOOD BY AUTOMATED COUNT: 14.7 % (ref 11.5–14.5)
GLOBULIN SER-MCNC: 3 GM/DL (ref 2.4–3.5)
GLUCOSE SERPL-MCNC: 171 MG/DL (ref 82–115)
HCT VFR BLD AUTO: 36.4 % (ref 40–51)
HGB BLD-MCNC: 11.8 GM/DL (ref 13.5–17.5)
IMM GRANULOCYTES # BLD AUTO: 0.02 10*3/UL
IMM GRANULOCYTES NFR BLD AUTO: 0 %
INR PPP: 1.09 (ref 0.9–1.2)
LYMPHOCYTES # BLD AUTO: 1.6 X10(3)/MCL (ref 0.6–4.6)
LYMPHOCYTES NFR BLD AUTO: 20 %
MAGNESIUM SERPL-MCNC: 1.9 MG/DL (ref 1.6–2.6)
MCH RBC QN AUTO: 31.7 PG (ref 26–34)
MCHC RBC AUTO-ENTMCNC: 32.4 GM/DL (ref 31–37)
MCV RBC AUTO: 97.8 FL (ref 80–100)
MONOCYTES # BLD AUTO: 0.6 X10(3)/MCL (ref 0.1–1.3)
MONOCYTES NFR BLD AUTO: 6 %
NEUTROPHILS # BLD AUTO: 6.08 X10(3)/MCL (ref 2.1–9.2)
NEUTROPHILS NFR BLD AUTO: 72 %
NRBC BLD AUTO-RTO: 0 % (ref 0–0.2)
PHOSPHATE SERPL-MCNC: 3.1 MG/DL (ref 2.3–4.7)
PLATELET # BLD AUTO: 143 X10(3)/MCL (ref 130–400)
PMV BLD AUTO: 9.5 FL (ref 7.4–10.4)
POTASSIUM SERPL-SCNC: 4.4 MMOL/L (ref 3.5–5.1)
POTASSIUM SERPL-SCNC: 4.7 MMOL/L (ref 3.5–5.1)
PROT SERPL-MCNC: 6.9 GM/DL (ref 5.8–7.6)
PROTHROMBIN TIME: 13.9 SECOND(S) (ref 11.9–14.4)
RBC # BLD AUTO: 3.72 X10(6)/MCL (ref 4.5–5.9)
SODIUM SERPL-SCNC: 141 MMOL/L (ref 136–145)
WBC # SPEC AUTO: 8.4 X10(3)/MCL (ref 4.5–11)

## 2021-12-17 LAB
ABS NEUT (OLG): 5.3 X10(3)/MCL (ref 2.1–9.2)
ALBUMIN SERPL-MCNC: 3.9 GM/DL (ref 3.4–4.8)
ALBUMIN/GLOB SERPL: 1.4 RATIO (ref 1.1–2)
ALP SERPL-CCNC: 55 UNIT/L (ref 40–150)
ALT SERPL-CCNC: 21 UNIT/L (ref 0–55)
AST SERPL-CCNC: 18 UNIT/L (ref 5–34)
BASOPHILS # BLD AUTO: 0 X10(3)/MCL (ref 0–0.2)
BASOPHILS NFR BLD AUTO: 0 %
BILIRUB SERPL-MCNC: 0.6 MG/DL
BILIRUBIN DIRECT+TOT PNL SERPL-MCNC: 0.3 MG/DL (ref 0–0.5)
BILIRUBIN DIRECT+TOT PNL SERPL-MCNC: 0.3 MG/DL (ref 0–0.8)
BUN SERPL-MCNC: 19.9 MG/DL (ref 8.4–25.7)
CALCIUM SERPL-MCNC: 9.8 MG/DL (ref 8.7–10.5)
CHLORIDE SERPL-SCNC: 105 MMOL/L (ref 98–107)
CO2 SERPL-SCNC: 27 MMOL/L (ref 23–31)
CREAT SERPL-MCNC: 0.86 MG/DL (ref 0.73–1.18)
EOSINOPHIL # BLD AUTO: 0.1 X10(3)/MCL (ref 0–0.9)
EOSINOPHIL NFR BLD AUTO: 2 %
ERYTHROCYTE [DISTWIDTH] IN BLOOD BY AUTOMATED COUNT: 14.7 % (ref 11.5–14.5)
GLOBULIN SER-MCNC: 2.8 GM/DL (ref 2.4–3.5)
GLUCOSE SERPL-MCNC: 133 MG/DL (ref 82–115)
HCT VFR BLD AUTO: 35.5 % (ref 40–51)
HGB BLD-MCNC: 11.4 GM/DL (ref 13.5–17.5)
IMM GRANULOCYTES # BLD AUTO: 0.03 10*3/UL
IMM GRANULOCYTES NFR BLD AUTO: 0 %
LYMPHOCYTES # BLD AUTO: 1.4 X10(3)/MCL (ref 0.6–4.6)
LYMPHOCYTES NFR BLD AUTO: 19 %
MCH RBC QN AUTO: 31.8 PG (ref 26–34)
MCHC RBC AUTO-ENTMCNC: 32.1 GM/DL (ref 31–37)
MCV RBC AUTO: 98.9 FL (ref 80–100)
MONOCYTES # BLD AUTO: 0.6 X10(3)/MCL (ref 0.1–1.3)
MONOCYTES NFR BLD AUTO: 8 %
NEUTROPHILS # BLD AUTO: 5.3 X10(3)/MCL (ref 2.1–9.2)
NEUTROPHILS NFR BLD AUTO: 71 %
NRBC BLD AUTO-RTO: 0 % (ref 0–0.2)
PLATELET # BLD AUTO: 152 X10(3)/MCL (ref 130–400)
PMV BLD AUTO: 10.4 FL (ref 7.4–10.4)
POTASSIUM SERPL-SCNC: 4.8 MMOL/L (ref 3.5–5.1)
PROT SERPL-MCNC: 6.7 GM/DL (ref 5.8–7.6)
RBC # BLD AUTO: 3.59 X10(6)/MCL (ref 4.5–5.9)
SODIUM SERPL-SCNC: 139 MMOL/L (ref 136–145)
WBC # SPEC AUTO: 7.5 X10(3)/MCL (ref 4.5–11)

## 2022-01-01 ENCOUNTER — OFFICE VISIT (OUTPATIENT)
Dept: CARDIOLOGY | Facility: CLINIC | Age: 67
End: 2022-01-01

## 2022-01-01 ENCOUNTER — LAB VISIT (OUTPATIENT)
Dept: LAB | Facility: HOSPITAL | Age: 67
End: 2022-01-01
Attending: NURSE PRACTITIONER

## 2022-01-01 VITALS
BODY MASS INDEX: 29.35 KG/M2 | WEIGHT: 209.69 LBS | SYSTOLIC BLOOD PRESSURE: 105 MMHG | OXYGEN SATURATION: 99 % | HEIGHT: 71 IN | DIASTOLIC BLOOD PRESSURE: 66 MMHG

## 2022-01-01 VITALS
SYSTOLIC BLOOD PRESSURE: 103 MMHG | HEART RATE: 80 BPM | TEMPERATURE: 98 F | OXYGEN SATURATION: 98 % | WEIGHT: 204.38 LBS | BODY MASS INDEX: 28.61 KG/M2 | DIASTOLIC BLOOD PRESSURE: 68 MMHG | RESPIRATION RATE: 18 BRPM | HEIGHT: 71 IN

## 2022-01-01 DIAGNOSIS — I44.2 COMPLETE HEART BLOCK: ICD-10-CM

## 2022-01-01 DIAGNOSIS — Z72.0 TOBACCO ABUSE: ICD-10-CM

## 2022-01-01 DIAGNOSIS — E78.2 MIXED HYPERLIPIDEMIA: ICD-10-CM

## 2022-01-01 DIAGNOSIS — I10 ESSENTIAL HYPERTENSION: ICD-10-CM

## 2022-01-01 DIAGNOSIS — I25.10 CORONARY ARTERY DISEASE INVOLVING NATIVE CORONARY ARTERY OF NATIVE HEART WITHOUT ANGINA PECTORIS: ICD-10-CM

## 2022-01-01 DIAGNOSIS — Z95.1 HX OF CABG: ICD-10-CM

## 2022-01-01 DIAGNOSIS — I25.10 CORONARY ARTERY DISEASE, UNSPECIFIED VESSEL OR LESION TYPE, UNSPECIFIED WHETHER ANGINA PRESENT, UNSPECIFIED WHETHER NATIVE OR TRANSPLANTED HEART: ICD-10-CM

## 2022-01-01 DIAGNOSIS — I25.5 ISCHEMIC CARDIOMYOPATHY: Primary | ICD-10-CM

## 2022-01-01 DIAGNOSIS — I48.0 PAROXYSMAL ATRIAL FIBRILLATION: ICD-10-CM

## 2022-01-01 DIAGNOSIS — E11.9 DIABETES: Primary | ICD-10-CM

## 2022-01-01 DIAGNOSIS — Z95.0 CARDIAC PACEMAKER IN SITU: ICD-10-CM

## 2022-01-01 DIAGNOSIS — E78.5 HYPERLIPIDEMIA, UNSPECIFIED HYPERLIPIDEMIA TYPE: Primary | ICD-10-CM

## 2022-01-01 DIAGNOSIS — E78.5 HYPERLIPIDEMIA, UNSPECIFIED HYPERLIPIDEMIA TYPE: ICD-10-CM

## 2022-01-01 DIAGNOSIS — E11.9 TYPE 2 DIABETES MELLITUS WITHOUT COMPLICATION, WITHOUT LONG-TERM CURRENT USE OF INSULIN: ICD-10-CM

## 2022-01-01 LAB
BASOPHILS # BLD AUTO: 0.05 X10(3)/MCL (ref 0–0.2)
BASOPHILS NFR BLD AUTO: 0.7 %
CHOLEST SERPL-MCNC: 77 MG/DL
CHOLEST/HDLC SERPL: 3 {RATIO} (ref 0–5)
EOSINOPHIL # BLD AUTO: 0.18 X10(3)/MCL (ref 0–0.9)
EOSINOPHIL NFR BLD AUTO: 2.4 %
ERYTHROCYTE [DISTWIDTH] IN BLOOD BY AUTOMATED COUNT: 15 % (ref 11.5–17)
EST. AVERAGE GLUCOSE BLD GHB EST-MCNC: 171.4 MG/DL
HBA1C MFR BLD: 7.6 %
HCT VFR BLD AUTO: 34.2 % (ref 42–52)
HDLC SERPL-MCNC: 26 MG/DL (ref 35–60)
HGB BLD-MCNC: 10.7 GM/DL (ref 14–18)
IMM GRANULOCYTES # BLD AUTO: 0.01 X10(3)/MCL (ref 0–0.02)
IMM GRANULOCYTES NFR BLD AUTO: 0.1 % (ref 0–0.43)
LDLC SERPL CALC-MCNC: 38 MG/DL (ref 50–140)
LYMPHOCYTES # BLD AUTO: 2.15 X10(3)/MCL (ref 0.6–4.6)
LYMPHOCYTES NFR BLD AUTO: 28.3 %
MCH RBC QN AUTO: 30.8 PG (ref 27–31)
MCHC RBC AUTO-ENTMCNC: 31.3 MG/DL (ref 33–36)
MCV RBC AUTO: 98.6 FL (ref 80–94)
MONOCYTES # BLD AUTO: 0.66 X10(3)/MCL (ref 0.1–1.3)
MONOCYTES NFR BLD AUTO: 8.7 %
NEUTROPHILS # BLD AUTO: 4.5 X10(3)/MCL (ref 2.1–9.2)
NEUTROPHILS NFR BLD AUTO: 59.8 %
NRBC BLD AUTO-RTO: 0 %
PLATELET # BLD AUTO: 210 X10(3)/MCL (ref 130–400)
PLATELETS.RETICULATED NFR BLD AUTO: 4.7 % (ref 0.9–11.2)
PMV BLD AUTO: 10.7 FL (ref 9.4–12.4)
RBC # BLD AUTO: 3.47 X10(6)/MCL (ref 4.7–6.1)
TRIGL SERPL-MCNC: 66 MG/DL (ref 34–140)
VLDLC SERPL CALC-MCNC: 13 MG/DL
WBC # SPEC AUTO: 7.6 X10(3)/MCL (ref 4.5–11.5)

## 2022-01-01 PROCEDURE — 99213 OFFICE O/P EST LOW 20 MIN: CPT | Mod: PBBFAC | Performed by: INTERNAL MEDICINE

## 2022-01-01 PROCEDURE — 83036 HEMOGLOBIN GLYCOSYLATED A1C: CPT

## 2022-01-01 PROCEDURE — 36415 COLL VENOUS BLD VENIPUNCTURE: CPT

## 2022-01-01 PROCEDURE — 85025 COMPLETE CBC W/AUTO DIFF WBC: CPT

## 2022-01-01 PROCEDURE — 80061 LIPID PANEL: CPT

## 2022-01-01 RX ORDER — CLOPIDOGREL BISULFATE 75 MG/1
75 TABLET ORAL
COMMUNITY
Start: 2021-12-17

## 2022-01-01 RX ORDER — METFORMIN HYDROCHLORIDE 1000 MG/1
1000 TABLET ORAL
COMMUNITY
Start: 2021-11-15

## 2022-01-01 RX ORDER — ATORVASTATIN CALCIUM 80 MG/1
80 TABLET, FILM COATED ORAL
COMMUNITY
Start: 2021-08-19

## 2022-01-01 RX ORDER — LOSARTAN POTASSIUM 50 MG/1
1 TABLET ORAL DAILY
COMMUNITY
Start: 2021-05-19 | End: 2022-01-01

## 2022-01-01 RX ORDER — CARVEDILOL 12.5 MG/1
12.5 TABLET ORAL 2 TIMES DAILY
COMMUNITY
Start: 2022-02-25

## 2022-01-01 RX ORDER — FAMOTIDINE 20 MG/1
20 TABLET, FILM COATED ORAL
COMMUNITY
Start: 2021-11-15

## 2022-01-01 RX ORDER — SACUBITRIL AND VALSARTAN 24; 26 MG/1; MG/1
TABLET, FILM COATED ORAL
COMMUNITY
Start: 2021-12-30

## 2022-01-01 RX ORDER — NITROGLYCERIN 0.4 MG/1
TABLET SUBLINGUAL
COMMUNITY
Start: 2022-01-01

## 2022-01-20 ENCOUNTER — HISTORICAL (OUTPATIENT)
Dept: CARDIOLOGY | Facility: HOSPITAL | Age: 67
End: 2022-01-20

## 2022-02-14 ENCOUNTER — HISTORICAL (OUTPATIENT)
Dept: INTERNAL MEDICINE | Facility: CLINIC | Age: 67
End: 2022-02-14

## 2022-02-14 LAB
ALBUMIN SERPL-MCNC: 4.2 G/DL (ref 3.4–4.8)
ALBUMIN/GLOB SERPL: 1.3 {RATIO} (ref 1.1–2)
ALP SERPL-CCNC: 64 U/L (ref 40–150)
ALT SERPL-CCNC: 20 U/L (ref 0–55)
AST SERPL-CCNC: 13 U/L (ref 5–34)
BILIRUB SERPL-MCNC: 0.7 MG/DL
BILIRUBIN DIRECT+TOT PNL SERPL-MCNC: 0.3 (ref 0–0.5)
BILIRUBIN DIRECT+TOT PNL SERPL-MCNC: 0.4 (ref 0–0.8)
BUN SERPL-MCNC: 17.5 MG/DL (ref 8.4–25.7)
CALCIUM SERPL-MCNC: 9.9 MG/DL (ref 8.7–10.5)
CHLORIDE SERPL-SCNC: 104 MMOL/L (ref 98–107)
CO2 SERPL-SCNC: 26 MMOL/L (ref 23–31)
CREAT SERPL-MCNC: 1.02 MG/DL (ref 0.73–1.18)
EST. AVERAGE GLUCOSE BLD GHB EST-MCNC: 165.7 MG/DL
GLOBULIN SER-MCNC: 3.2 G/DL (ref 2.4–3.5)
GLUCOSE SERPL-MCNC: 171 MG/DL (ref 82–115)
HBA1C MFR BLD: 7.4 %
ICTERIC INTERF INDEX SERPL-ACNC: 0
POTASSIUM SERPL-SCNC: 4.9 MMOL/L (ref 3.5–5.1)
PROT SERPL-MCNC: 7.4 G/DL (ref 5.8–7.6)
SODIUM SERPL-SCNC: 139 MMOL/L (ref 136–145)

## 2022-02-24 ENCOUNTER — HOSPITAL ENCOUNTER (OUTPATIENT)
Dept: MEDSURG UNIT | Facility: HOSPITAL | Age: 67
End: 2022-02-25
Attending: INTERNAL MEDICINE

## 2022-02-24 ENCOUNTER — HISTORICAL (OUTPATIENT)
Dept: ADMINISTRATIVE | Facility: HOSPITAL | Age: 67
End: 2022-02-24

## 2022-02-24 LAB
ABS NEUT (OLG): 5.09 (ref 2.1–9.2)
BASOPHILS # BLD AUTO: 0 10*3/UL (ref 0–0.2)
BASOPHILS NFR BLD AUTO: 0 %
BUN SERPL-MCNC: 21.2 MG/DL (ref 8.4–25.7)
CALCIUM SERPL-MCNC: 9.7 MG/DL (ref 8.7–10.5)
CBG: 111 (ref 70–115)
CHLORIDE SERPL-SCNC: 105 MMOL/L (ref 98–107)
CO2 SERPL-SCNC: 28 MMOL/L (ref 23–31)
CREAT SERPL-MCNC: 1.16 MG/DL (ref 0.73–1.18)
CREAT/UREA NIT SERPL: 18
EOSINOPHIL # BLD AUTO: 0.2 10*3/UL (ref 0–0.9)
EOSINOPHIL NFR BLD AUTO: 2 %
ERYTHROCYTE [DISTWIDTH] IN BLOOD BY AUTOMATED COUNT: 14.6 % (ref 11.5–14.5)
FLAG2 (OHS): 80
FLAG3 (OHS): 80
FLAGS (OHS): 70
GLUCOSE SERPL-MCNC: 165 MG/DL (ref 82–115)
HCT VFR BLD AUTO: 38.5 % (ref 40–51)
HEMOLYSIS INTERF INDEX SERPL-ACNC: 2
HGB BLD-MCNC: 12.7 G/DL (ref 13.5–17.5)
ICTERIC INTERF INDEX SERPL-ACNC: 0
IMM GRANULOCYTES # BLD AUTO: 0.03 10*3/UL
IMM GRANULOCYTES NFR BLD AUTO: 0 %
LIPEMIC INTERF INDEX SERPL-ACNC: <0
LOW EVENT # SUSPECT FLAG (OHS): 70
LYMPHOCYTES # BLD AUTO: 2.8 10*3/UL (ref 0.6–4.6)
LYMPHOCYTES NFR BLD AUTO: 32 %
MANUAL DIFF? (OHS): NO
MCH RBC QN AUTO: 32.3 PG (ref 26–34)
MCHC RBC AUTO-ENTMCNC: 33 G/DL (ref 31–37)
MCV RBC AUTO: 98 FL (ref 80–100)
MO BLASTS SUSPECT FLAG (OHS): 30
MONOCYTES # BLD AUTO: 0.7 10*3/UL (ref 0.1–1.3)
MONOCYTES NFR BLD AUTO: 8 %
NEUTROPHILS # BLD AUTO: 5.09 10*3/UL (ref 2.1–9.2)
NEUTROPHILS NFR BLD AUTO: 58 %
NRBC BLD AUTO-RTO: 0 % (ref 0–0.2)
PLATELET # BLD AUTO: 190 10*3/UL (ref 130–400)
PLATELET CLUMPS SUSPECT FLAG (OHS): 10
PMV BLD AUTO: 9.8 FL (ref 7.4–10.4)
POTASSIUM SERPL-SCNC: 5.2 MMOL/L (ref 3.5–5.1)
RBC # BLD AUTO: 3.93 10*6/UL (ref 4.5–5.9)
SARS-COV-2 AG RESP QL IA.RAPID: NEGATIVE
SODIUM SERPL-SCNC: 138 MMOL/L (ref 136–145)
WBC # SPEC AUTO: 8.8 10*3/UL (ref 4.5–11)

## 2022-02-25 LAB
ABS NEUT (OLG): 3.82 (ref 2.1–9.2)
ALBUMIN SERPL-MCNC: 3.6 G/DL (ref 3.4–4.8)
ALBUMIN/GLOB SERPL: 1.2 {RATIO} (ref 1.1–2)
ALP SERPL-CCNC: 52 U/L (ref 40–150)
ALT SERPL-CCNC: 11 U/L (ref 0–55)
AST SERPL-CCNC: 11 U/L (ref 5–34)
BASOPHILS # BLD AUTO: 0 10*3/UL (ref 0–0.2)
BASOPHILS NFR BLD AUTO: 0 %
BILIRUB SERPL-MCNC: 0.7 MG/DL
BILIRUBIN DIRECT+TOT PNL SERPL-MCNC: 0.3 (ref 0–0.5)
BILIRUBIN DIRECT+TOT PNL SERPL-MCNC: 0.4 (ref 0–0.8)
BUN SERPL-MCNC: 17.8 MG/DL (ref 8.4–25.7)
CALCIUM SERPL-MCNC: 9.3 MG/DL (ref 8.7–10.5)
CBG: 151 (ref 70–115)
CBG: 195 (ref 70–115)
CHLORIDE SERPL-SCNC: 105 MMOL/L (ref 98–107)
CO2 SERPL-SCNC: 25 MMOL/L (ref 23–31)
CREAT SERPL-MCNC: 0.85 MG/DL (ref 0.73–1.18)
EOSINOPHIL # BLD AUTO: 0.2 10*3/UL (ref 0–0.9)
EOSINOPHIL NFR BLD AUTO: 2 %
ERYTHROCYTE [DISTWIDTH] IN BLOOD BY AUTOMATED COUNT: 14.7 % (ref 11.5–14.5)
FLAG2 (OHS): 70
FLAG3 (OHS): 80
FLAGS (OHS): 70
GLOBULIN SER-MCNC: 2.9 G/DL (ref 2.4–3.5)
GLUCOSE SERPL-MCNC: 127 MG/DL (ref 82–115)
HCT VFR BLD AUTO: 34.1 % (ref 40–51)
HEMOLYSIS INTERF INDEX SERPL-ACNC: 1
HGB BLD-MCNC: 11.3 G/DL (ref 13.5–17.5)
ICTERIC INTERF INDEX SERPL-ACNC: 1
IMM GRANULOCYTES # BLD AUTO: 0.02 10*3/UL
IMM GRANULOCYTES NFR BLD AUTO: 0 %
LIPEMIC INTERF INDEX SERPL-ACNC: 4
LOW EVENT # SUSPECT FLAG (OHS): 70
LYMPHOCYTES # BLD AUTO: 2 10*3/UL (ref 0.6–4.6)
LYMPHOCYTES NFR BLD AUTO: 30 %
MANUAL DIFF? (OHS): NO
MCH RBC QN AUTO: 31.9 PG (ref 26–34)
MCHC RBC AUTO-ENTMCNC: 33.1 G/DL (ref 31–37)
MCV RBC AUTO: 96.3 FL (ref 80–100)
MO BLASTS SUSPECT FLAG (OHS): 30
MONOCYTES # BLD AUTO: 0.7 10*3/UL (ref 0.1–1.3)
MONOCYTES NFR BLD AUTO: 10 %
NEUTROPHILS # BLD AUTO: 3.82 10*3/UL (ref 2.1–9.2)
NEUTROPHILS NFR BLD AUTO: 57 %
NRBC BLD AUTO-RTO: 0 % (ref 0–0.2)
PLATELET # BLD AUTO: 164 10*3/UL (ref 130–400)
PMV BLD AUTO: 10.6 FL (ref 7.4–10.4)
POTASSIUM SERPL-SCNC: 3.8 MMOL/L (ref 3.5–5.1)
PROT SERPL-MCNC: 6.5 G/DL (ref 5.8–7.6)
RBC # BLD AUTO: 3.54 10*6/UL (ref 4.5–5.9)
SODIUM SERPL-SCNC: 136 MMOL/L (ref 136–145)
WBC # SPEC AUTO: 6.7 10*3/UL (ref 4.5–11)

## 2022-03-10 ENCOUNTER — CLINICAL SUPPORT (OUTPATIENT)
Dept: CARDIOLOGY | Facility: CLINIC | Age: 67
End: 2022-03-10

## 2022-03-10 DIAGNOSIS — Z95.0 CARDIAC PACEMAKER IN SITU: Primary | ICD-10-CM

## 2022-03-10 DIAGNOSIS — I44.2 COMPLETE HEART BLOCK: ICD-10-CM

## 2022-03-10 PROCEDURE — 93296 REM INTERROG EVL PM/IDS: CPT | Mod: ,,, | Performed by: INTERNAL MEDICINE

## 2022-03-10 PROCEDURE — 93280 PM DEVICE PROGR EVAL DUAL: CPT | Mod: 26,S$PBB,, | Performed by: INTERNAL MEDICINE

## 2022-03-10 PROCEDURE — 99024 PR POST-OP FOLLOW-UP VISIT: ICD-10-PCS | Mod: ,,, | Performed by: INTERNAL MEDICINE

## 2022-03-10 PROCEDURE — 99024 POSTOP FOLLOW-UP VISIT: CPT | Mod: ,,, | Performed by: INTERNAL MEDICINE

## 2022-03-10 PROCEDURE — 93280 PM DEVICE PROGR EVAL DUAL: CPT | Mod: PBBFAC | Performed by: INTERNAL MEDICINE

## 2022-03-10 PROCEDURE — 99999 PR PBB SHADOW E&M-EST. PATIENT-LVL I: ICD-10-PCS | Mod: PBBFAC,,,

## 2022-03-10 PROCEDURE — 93280 PR PROGRAM EVAL (IN PERSON) IMPLANT DEVICE,PACEMAKER,2 LEAD: ICD-10-PCS | Mod: 26,S$PBB,, | Performed by: INTERNAL MEDICINE

## 2022-03-10 PROCEDURE — 99211 OFF/OP EST MAY X REQ PHY/QHP: CPT | Mod: PBBFAC

## 2022-03-10 PROCEDURE — 99999 PR PBB SHADOW E&M-EST. PATIENT-LVL I: CPT | Mod: PBBFAC,,,

## 2022-03-10 PROCEDURE — 93296 PR INTERROG EVAL, REMOTE, UP TO 90 DAYS, PACER/DEFIB,TECH REVIEW: ICD-10-PCS | Mod: ,,, | Performed by: INTERNAL MEDICINE

## 2022-03-16 ENCOUNTER — HISTORICAL (OUTPATIENT)
Dept: RADIOLOGY | Facility: HOSPITAL | Age: 67
End: 2022-03-16

## 2022-04-11 ENCOUNTER — HISTORICAL (OUTPATIENT)
Dept: ADMINISTRATIVE | Facility: HOSPITAL | Age: 67
End: 2022-04-11

## 2022-05-03 PROBLEM — I48.0 PAROXYSMAL ATRIAL FIBRILLATION: Status: ACTIVE | Noted: 2022-01-01

## 2022-05-03 PROBLEM — I42.0 DILATED CARDIOMYOPATHY: Status: ACTIVE | Noted: 2022-01-01

## 2022-05-03 PROBLEM — E78.2 MIXED HYPERLIPIDEMIA: Status: ACTIVE | Noted: 2022-01-01

## 2022-05-03 PROBLEM — I25.5 ISCHEMIC CARDIOMYOPATHY: Status: ACTIVE | Noted: 2022-01-01

## 2022-05-03 PROBLEM — Z72.0 TOBACCO ABUSE: Status: ACTIVE | Noted: 2022-01-01

## 2022-05-03 NOTE — PROGRESS NOTES
Subjective:    Patient ID:  Darren Patel is a 66 y.o. male who presents for follow-up of Follow-up and Dizziness (Pt states he has no other concerns)      HPI   Patient underwent peripheral angiogram procedure on February 24, 2022 in which no intervention was performed. Medical management was recommended with consideration of patient returning to Dr. Estrada for revascularization of claudication continued. Patient had been able to walk 1.5 miles.  He had been less SOB. He continues to smoke 1.5 PPD.     Today the patient voices the following.  He does not have chest pain, does not have SOB, does not have LORENZANA, does not have orthopnea, does not have PND, does not have palpitations, has dizziness (when he looks up and then immediately looks down), does not have syncope, does not have claudication, does not have cyanosis, does not have edema.  His level of exertion is adequate.  he does not have symptoms with this level of exertion.        Review of Systems   Constitutional: Negative for chills and fever.   Cardiovascular: Negative for chest pain, claudication, dyspnea on exertion, leg swelling, orthopnea, palpitations and syncope.   Gastrointestinal: Negative for constipation, diarrhea, heartburn, nausea and vomiting.   Genitourinary: Negative for dysuria and hematuria.   Neurological: Positive for dizziness. Negative for focal weakness.   Psychiatric/Behavioral: Negative for depression. The patient is not nervous/anxious.         Objective:    Physical Exam  Constitutional:       General: He is not in acute distress.     Appearance: He is not ill-appearing.   HENT:      Head: Normocephalic and atraumatic.      Nose: Nose normal.   Eyes:      Conjunctiva/sclera: Conjunctivae normal.   Neck:      Vascular: No carotid bruit or JVD.   Cardiovascular:      Rate and Rhythm: Normal rate and regular rhythm.      Pulses: Normal pulses.           Carotid pulses are 2+ on the right side and 2+ on the left side.       Radial  pulses are 2+ on the right side and 2+ on the left side.      Heart sounds: Normal heart sounds. No murmur heard.  Pulmonary:      Effort: No respiratory distress.      Breath sounds: Normal breath sounds. No wheezing or rales.   Abdominal:      General: There is no distension.      Palpations: Abdomen is soft.      Tenderness: There is no abdominal tenderness.   Musculoskeletal:         General: No deformity.      Cervical back: Neck supple.      Right lower leg: No edema.      Left lower leg: No edema.   Skin:     General: Skin is warm.      Findings: No rash.   Neurological:      General: No focal deficit present.      Mental Status: He is oriented to person, place, and time.   Psychiatric:         Mood and Affect: Mood normal.         Thought Content: Thought content normal.           Assessment:       1. Ischemic cardiomyopathy    2. Paroxysmal atrial fibrillation    3. Mixed hyperlipidemia    4. Complete heart block    5. Essential hypertension    6. Coronary artery disease involving native coronary artery of native heart without angina pectoris    7. Cardiac pacemaker in situ    8. Type 2 diabetes mellitus without complication, without long-term current use of insulin    9. Tobacco abuse         Plan:         Ischemic cardiomyopathy  Continue Entresto, coreg    Paroxysmal atrial fibrillation  Continue Eliquis     Mixed hyperlipidemia  Continue lipitor     Complete heart block  Patient has pacemaker     Essential hypertension  Controlled     Coronary artery disease involving native coronary artery of native heart without angina pectoris  Continue walking for exercise    Cardiac pacemaker in situ  Routine PM checks     Type 2 diabetes mellitus without complication, without long-term current use of insulin  Per PCP    Tobacco Abuse  Encouraged patient to work on quitting smoking.

## 2022-05-03 NOTE — PATIENT INSTRUCTIONS
Patient is encouraged to work on quitting smoking.     Plan:          Ischemic cardiomyopathy  Continue Entresto, coreg     Paroxysmal atrial fibrillation  Continue Eliquis      Mixed hyperlipidemia  Continue lipitor      Complete heart block  Patient has pacemaker      Essential hypertension  Controlled      Coronary artery disease involving native coronary artery of native heart without angina pectoris  Continue walking for exercise     Cardiac pacemaker in situ  Routine PM checks      Type 2 diabetes mellitus without complication, without long-term current use of insulin  Per PCP     Tobacco Abuse  Encouraged patient to work on quitting smoking.

## 2022-05-05 NOTE — HISTORICAL [facility]
This is a historical note converted from [employer]. Formatting and pictures may have been removed.  Please reference [employer] for original formatting and attached multimedia. History of Present Illness  Patient is a 66 year old male with PMH of DM, HTN, HLD, PAD, HFrEF, CAD s/p CABG x3 in 05/21 and pacemaker placement who presents after stenting of right SFA, Right profundus and right external iliac. Post-operatively, patient had pulses which were present in right foot, whereas prior to the procedure none were present per interventional cardiologist. Patient presented with complaint of leg?pain?with 2 block exertion and upon cardiology evaluation, was referred for intervention after lower extremity US revealed significant peripheral arterial disease of right lower extremity. Patient received loading dose of Plavix prior to procedure of 600 mg, with plan to continue low dose Aspirin/Plavix and resume Eliquis tomorrow. Medicine consulted, patient admitted to telemetry unit for post-procedural monitoring.  ?  Allergies: NKDA  PSH: CABG x3, pacemaker 11/20, right ankle surgery  SH: smokes 1.5 PPD, denies alcohol/drug use  FH: DM  Meds: listed  Review of Systems  10 point ROS reviewed  Physical Exam  Vitals & Measurements  T:?36.8? ?C (Oral)? HR:?70(Peripheral)? RR:?18? BP:?96/82? SpO2:?98%? WT:?94.1?kg? BMI:?29.04?  General:?well-developed well-nourished in no acute distress, obese  Eye: PERRLA, EOMI, clear conjunctiva, eyelids normal  HENT:?AT, NC, oropharynx without erythema/exudate, oropharynx and nasal mucosal surfaces moist  Neck: full range of motion, no thyromegaly or lymphadenopathy  Respiratory:?clear to auscultation bilaterally, symmetric expansion  Cardiovascular:?regular rate and rhythm without murmurs, gallops or rubs, no peripheral edema, R DP dopplerable, L DP pulse +1  Gastrointestinal:?soft, non-tender, non-distended with normal bowel sounds, without masses to palpation  Musculoskeletal:?full range of motion  of all extremities/spine without limitation or discomfort  Integumentary: no rashes or skin lesions present  Neurologic: AOx3, no signs of peripheral neurological deficit, motor/sensory function intact  ?  ?  Labs Last 24 Hours?(pending CBC/CMP/Mg/Ph/PT/PTT)  ?Chemistry? Hematology/Coagulation?   Potassium Lvl: 4.4 mmol/L (12/16/21 06:21:00) POC ACT:?279 second(s)?High (12/16/21 11:46:00)   ?  ?  Imaging: none  Assessment/Plan  s/p stenting of right SFA, Right profundus and right external iliac arteries  received 600 mg Plavix, resume low dose ASA/Plavix?and Eliquis tomorrow per cardiologist  plan for stenting of right popliteal artery next month given total occlusion  continue to monitor on telemetry unit  ?   CAD s/p CABG x3 in 05/21 and pacemaker placement  continue DAPT, statin, nitro prn for chest pain  ?  HFrEF 35% 10/21  Moderately dilated LV  continue Coreg, Losartan  ?   DM  holding metformin  low SSI ordered  ?   HTN  continue Coreg, Losartan  ?   HLD  continue high intensity statin  ?  ?   Diet: Cardiac  DVT ppx: Eliquis  ?   Dispo: Admitted to telemetry for post-op monitoring.  ?  Attending Physician Addendum  Patient was seen and examined on AM rounds. Management and plan were discussed with  resident. Care was reasonable and necessary.  Doing well.? Denies any complaints.? Had a stent of the right SFA, right profundus and right external iliac.? Stable to be discharged with dual antiplatelet and Eliquis.? No other issues.  Time spent on DC greater than 35 min.   Problem List/Past Medical History  Ongoing  Diabetes  HFrEF (heart failure with reduced ejection fraction)  HLD (hyperlipidemia)  HTN (hypertension)  Obesity (BMI 30.0-34.9)  Peripheral arterial disease  Presence of cardiac pacemaker  S/P CABG (coronary artery bypass graft)  Historical  No qualifying data  Procedure/Surgical History  Catheter placement in coronary artery(s) for coronary angiography, including intraprocedural injection(s) for  coronary angiography, imaging supervision and interpretation; with left heart catheterization including intraprocedural injection(s) for left kraig (04/07/2021)  Fluoroscopy of Left Internal Mammary Bypass Graft using Low Osmolar Contrast (04/07/2021)  Measurement of Cardiac Sampling and Pressure, Left Heart, Percutaneous Approach (04/07/2021)  Pacemaker (11/06/2020)  Performance of Cardiac Pacing, Continuous (11/04/2020)  right ankle repair  Triple coronary bypass   Medications  Inpatient  aspirin, 81 mg= 1 tab(s), Oral, Daily  atorvastatin 20 mg oral tablet, 80 mg= 4 tab(s), Oral, Daily  carvedilol 12.5 mg oral tablet, 12.5 mg= 1 tab(s), Oral, BID  Eliquis, 5 mg= 2 tab(s), Oral, BID  losartan 25 mg oral tablet, 25 mg= 0.5 tab(s), Oral, Daily  nitroglycerin 0.4 mg sublingual TAB, 0.4 mg= 1 tab(s), SL, q5min, PRN  Normal Saline (0.9% NS)  mL, 500 mL, IV  Plavix 75 mg oral tablet, 75 mg= 1 tab(s), Oral, Daily  Home  aspirin 81 mg oral Delayed Release (EC) tablet, 81 mg= 1 tab(s), Oral, Daily, 3 refills  atorvastatin 80 mg oral tablet, 80 mg= 1 tab(s), Oral, Daily, 11 refills  carvedilol 12.5 mg oral tablet, 12.5 mg= 1 tab(s), Oral, BID, 3 refills  Eliquis 5 mg oral tablet, 5 mg= 1 tab(s), Oral, BID, 6 refills  Entresto 24 mg-26 mg oral tablet, 3 tab(s), Oral, BID, 3 refills,? ?Not taking: waiting for [employer] approval  famotidine 20 mg oral tablet, 20 mg= 1 tab(s), Oral, Daily, 1 refills  losartan 25 mg oral tablet, 25 mg= 1 tab(s), Oral, Daily  metFORMIN 1000 mg oral tablet, extended release, 1000 mg= 1 tab(s), Oral, Daily, 1 refills  nitroglycerin 0.4 mg sublingual TAB, 0.4 mg= 1 tab(s), SL, q5min, PRN, 6 refills  Plavix 75 mg oral tablet, 75 mg= 1 tab(s), Oral, Daily, 3 refills  True Metrix Glucometer, See Instructions  True Metrix Lancets, See Instructions, 11 refills  True Metrix Test Strips, See Instructions, 11 refills  Allergies  No Known Allergies  Social History  Abuse/Neglect  No, No, Yes,  12/16/2021  Alcohol  Current, Liquor, 1-2 times per year, Alcohol use interferes with work or home: No. Others hurt by drinking: No. Household alcohol concerns: No., 07/15/2021  Employment/School  Retired, 12/30/2020  Exercise  Exercise duration: 60. Exercise frequency: Daily. Exercise type: Walking., 12/30/2020  Home/Environment  Lives with Alone. Living situation: Home/Independent., 12/30/2020    Never in , 01/12/2021  Nutrition/Health  Regular, Good, 11/15/2021  Sexual  Gender Identity Identifies as male., 01/12/2021  Spiritual/Cultural  None, 12/30/2020  Substance Use  Past, Cocaine, 07/15/2021  Tobacco  10 or more cigarettes (1/2 pack or more)/day in last 30 days, Cigarettes, No, 30 per day., 12/16/2021  Family History  Aneurysm: Father.  Bipolar: Mother.  Diabetes: Sister.  Schizophrenia: Mother.  Stroke: Father.  Stroke: Father.  Immunizations  Vaccine Date Status   COVID-19, vector nr, [id number] - Katharina 06/24/2021 Given
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
## 2022-05-14 NOTE — DISCHARGE SUMMARY
Admit and Discharge Dates  Admit Date: 02/24/2022  Discharge Date: 02/25/2022  Physicians  Attending Physician - Dorota GANDHI, Nahid BECK  Admitting Physician - Nahid Raya MD  Primary Care Physician - Chente QUEEN, Sheila Florese  Discharge Diagnosis  1.?Status post percutaneous arteriogram?Z98.890  2.?Peripheral arterial disease?I73.9  3.?HTN (hypertension)?I10  4.?Diabetes?E11.9  Immunizations  No immunizations recorded for this visit.  Hospital Course  Pt is a 67 y/o male with PMH of HTN, DM, HLD,PAD, HFrEF (LVEF 35 to 40%; 10/2021), CAD status post CABG x3 (05/2021), pacemaker (11/2020), admitted for overnight observation s/p diagnostic unilateral Right Arteriogram.  Pt tolerated procedure well. Angiogram revealed occlusion of the distal popliteal artery, anterior tibial artery and severe stenosis in the posterior tibial artery and peroneal artery. Vitals remained stable. Pt maintained on bed rest with 30 degree head elevation allowed after 6 hours. Pt diet resumed and evening home medication given. US showed superficial hematoma at insertion site. Morning H/H stable. Vitals stable.  Plan to discharge home continuing home medications. Pt is scheduled with PCP, cardiology, radiology, and will follow with Dr. Raya for further interventions.  ?  Significant Findings  Angiogram revealed occlusion of the distal popliteal artery, anterior tibial artery and severe stenosis in the posterior tibial artery and peroneal artery.  Time Spent on discharge  >30 mins  Objective  Vitals & Measurements  T:?37.4? ?C (Oral)? TMIN:?36.5? ?C (Oral)? TMAX:?37.4? ?C (Oral)? HR:?71(Peripheral)? RR:?16? BP:?110/70? SpO2:?95%?  Physical Exam  General:?Alert and oriented,?no acute distress  Respiratory:?Lungs clear to auscultation bilaterally,?No increased work of breathing  Cardiovascular:?S1-S2, regular rate, regular rhythm,?2+ radial pulses,?No lower extremity edema  Gastrointestinal:?Soft, nontender, nondistended  Musculoskeletal: Appropriate  movement of extremities bilaterally  Integumentary: ecchymosis surrounding inguinal insertion bandage, nontender to palpation surrounding area  Patient Discharge Condition  Stable  Discharge Disposition  Discharge to home  Attending Physician Addendum  Patient was seen and examined on AM rounds. Management and plan were?discussed with resident. Care was reasonable and necessary.?   Discharge Medication Reconciliation  Continue  Misc Prescription (True Metrix Glucometer)?See Instructions  Misc Prescription (True Metrix Lancets)?See Instructions  Misc Prescription (True Metrix Test Strips)?See Instructions  apixaban (Eliquis 5 mg oral tablet)?5 mg, Oral, BID  atorvastatin (atorvastatin 80 mg oral tablet)?80 mg, Oral, Daily  carvedilol (carvedilol 12.5 mg oral tablet)?12.5 mg, Oral, BID  clopidogrel (Plavix 75 mg oral tablet)?75 mg, Oral, Daily  famotidine (famotidine 20 mg oral tablet)?20 mg, Oral, Daily  metFORMIN (metFORMIN 1000 mg oral tablet, extended release)?1,000 mg, Oral, Daily  nitroglycerin (nitroglycerin 0.4 mg sublingual TAB)?0.4 mg, SL, q5min, PRN for chest pain  sacubitril-valsartan (Entresto 24 mg-26 mg oral tablet)?1 tab(s), Oral, BID  Discontinue  aspirin (aspirin 81 mg oral Delayed Release (EC) tablet)?81 mg, Oral, Daily  Education and Orders Provided  Peripheral Vascular Disease, Easy-to-Read  Discharge - 02/25/22 14:29:00 CST, Home?  Follow up  [facility] - Cardiology Clinic, on 03/03/2022  ????Keep scheduled appointment  Sheila Eldridge  ????Keep scheduled appointment  Nahid Raya  ????Call to confirm scheduling

## 2022-05-20 NOTE — HISTORICAL [facility]
This is a historical note converted from Ariela. Formatting and pictures may have been removed.  Please reference Ariela for original formatting and attached multimedia. History of Present Illness  Pt is 66 male PMH HTN, HLD, DM, CHF,?PAD with intervention to right SFA presented as direct admission s/p diagnostic unilateral Right Arteriogram with Dr. Raya today. Pt with hx PAD with continued claudication.?Pt is seen s/p procedure. Vitals stable. Resting comfortably. Mildly sedated from procedure but able to confirm medications and provide limited history. Pt confirms daily medication compliance  ?  PMH: DM, HTN. HLD, PAD, CHF, a-Fib.  Allergies: None  Meds: Entresto, Eliquis, Plavix, ?metformin, ASA, carvedilol, Atorvastatin  Surgeries: pacemaker placement, triple bypass,  Hospitalization: November 2021  Social: 30 cigarettes daily since 14 yrs old, Alcohol use only on holidays, denies current drug use. Tobacco and cocaine use per bran t review  Review of Systems  Constitutional:?no fevers, chills or fatigue  Respiratory:?no trouble breathing or shortness of breath  Cardiovascular:?no chest pain or tightness,?no shortness breath on activity,?no ankle swelling  Gastrointestinal:no constipation,?no diarrhea,?no nausea,?no vomiting  Musculoskeletal:?No muscle pain,?no joint pain  Integumentary: no rashes or breakdown  Neurologic: no dizziness, no fainting  Physical Exam  Vitals & Measurements  T:?36.6? ?C (Oral)? TMIN:?36.6? ?C (Oral)? TMAX:?36.8? ?C (Oral)? HR:?60(Monitored)? RR:?12? BP:?109/62? SpO2:?100%? WT:?93.8?kg? BMI:?28.95?  General:?Alert and oriented,?no acute distress  Respiratory:?Lungs clear to auscultation bilaterally,?No increased work of breathing  Cardiovascular:?S1-S2,??No lower extremity edema, faint DP, 2+ PT, feet cool to touch  Gastrointestinal:?Soft, nontender, nondistended  Musculoskeletal: Appropriate movement of extremities bilaterally  Integumentary: bruising Rt inguinal region with bandage  in place, bandage Rt medial foot  Assessment/Plan  PAD  S/p unilateral arteriogram with superficial hematoma  - Observation with telemetry monitoring  - Bed rest until tomorrow  - May incline to 30 degrees after 6 hours  - Continue home medications tomorrow AM  - Advance diet as tolerated  ?  DM  - Low SSI  - Hold home metformin  ?  HFrEF  A-fib  - Entresto BID, ASA, Plavix, Coreg  - Cardiac, low sodium diet  ?  HLD  CAD  - Home ASA and atorvastatin  ?  ?  Dispo: Discharge tomorrow pending recovery course  ?  ?  Kiah Saravia M.D.  [facility]  ?  ?  ?  PGY-2 Addendum:  Patient seen and assessed with Dr. Saravia, agree with above assessment and plan  ?  66-year-old male with a history of PAD, HFrEF (LVEF 35 to 40%; 10/2021), CAD status post CABG x3 (05/2021), pacemaker (11/2020) was admitted for overnight observation after right lower extremity angiogram.? Prior to procedure today patient had continued claudication of the right lower extremity causing increased pain with prolonged ambulation.? During the procedure patient was found to have occlusion of the distal popliteal artery, anterior tibial artery and severe stenosis in the posterior tibial artery and peroneal artery.? Following procedure patient was in postop area in stable condition.? Hematoma was noticed at the access site, ultrasound of the area revealed a 1.7 x 0.9 cm hematoma, however patient remains clinically and hemodynamically stable.? Internal medicine was consulted for admission for observation overnight after right lower extremity angiogram.  ?  PE: No acute distress, lying flat comfortably in bed, right inguinal region with gauze access site, with minimal dried blood, no signs of active bleeding, mild tenderness to palpation in the right inguinal region, right medial ankle also with callus with dried blood, small hematoma, no signs of active bleeding, trace edema in bilateral lower extremities  ?  A/P:  Patient is status post right  lower extremity angiogram, today showed occlusion in several arteries.? Patient advised to lay flat for 6 hours following procedure, would then be able to lay at about 30 degree angle.? Patient had hematoma at access site following procedure, patient showing no signs of active bleeding at this time and is hemodynamically stable, ultrasound of the area revealed a small one by one hematoma, will continue to monitor, advised patient to remain with feet elevated.? Patient will be monitored on telemetry unit overnight.? We will continue home meds for chronic conditions at home 02/25/2022.? Cath Lab note recommended patient return at a later date for revascularization of that right lower extremity.  ?  Edie Em MD  [employer] ? PGY-2  Attending Physician Addendum  Patient was seen and examined on AM rounds. Management and plan were?discussed with resident. Care was reasonable and necessary.?   Problem List/Past Medical History  Ongoing  Diabetes  HFrEF (heart failure with reduced ejection fraction)  History of COVID-19  HLD (hyperlipidemia)  HTN (hypertension)  Obesity (BMI 30.0-34.9)  Peripheral arterial disease  Presence of cardiac pacemaker  S/P CABG (coronary artery bypass graft)  Historical  No qualifying data  Procedure/Surgical History  Dilation of Right External Iliac Artery using Drug-Coated Balloon, Percutaneous Approach (12/16/2021)  Dilation of Right Femoral Artery with Two Intraluminal Devices, Percutaneous Approach (12/16/2021)  Extirpation of Matter from Right External Iliac Artery, Percutaneous Approach (12/16/2021)  Extirpation of Matter from Right Femoral Artery, Percutaneous Approach (12/16/2021)  Intravascular ultrasound (noncoronary vessel) during diagnostic evaluation and/or therapeutic intervention, including radiological supervision and interpretation; initial noncoronary vessel (List separately in addition to code for primary procedure) (12/16/2021)  Revascularization, endovascular, open or  percutaneous, femoral, popliteal artery(s), unilateral; with transluminal stent placement(s) and atherectomy, includes angioplasty within the same vessel, when performed (12/16/2021)  Revascularization, endovascular, open or percutaneous, iliac artery, unilateral, initial vessel; with transluminal stent placement(s), includes angioplasty within the same vessel, when performed (12/16/2021)  Ultrasonography of Right Lower Extremity Arteries, Intravascular (12/16/2021)  Catheter placement in coronary artery(s) for coronary angiography, including intraprocedural injection(s) for coronary angiography, imaging supervision and interpretation; with left heart catheterization including intraprocedural injection(s) for left kraig (04/07/2021)  Fluoroscopy of Left Internal Mammary Bypass Graft using Low Osmolar Contrast (04/07/2021)  Measurement of Cardiac Sampling and Pressure, Left Heart, Percutaneous Approach (04/07/2021)  Pacemaker (11/06/2020)  Performance of Cardiac Pacing, Continuous (11/04/2020)  right ankle repair  Triple coronary bypass   Medications  Inpatient  aspirin, 81 mg= 1 tab(s), Oral, Daily  atorvastatin 20 mg oral tablet, 80 mg= 4 tab(s), Oral, Daily  carvedilol 12.5 mg oral tablet, 12.5 mg= 1 tab(s), Oral, BID  Dextrose 50% and Water (50 mL vial/syringe), 12.5 gm= 25 mL, IV Push, Once, PRN  Dextrose 50% and Water (50 mL vial/syringe), 12.5 gm= 25 mL, IV Push, As Directed, PRN  Dextrose 50% and Water (50 mL vial/syringe), 25 gm= 50 mL, IV Push, As Directed, PRN  Dextrose 50% in Water intravenous solution, 12.5 gm= 25 mL, IV Push, As Directed, PRN  Eliquis, 5 mg= 2 tab(s), Oral, BID  Entresto 24 mg-26 mg oral tablet, 1 tab(s), Oral, BID  famotidine 20 mg oral tablet, 20 mg= 1 tab(s), Oral, Daily  glucagon recombinant 1 mg injection, 1 mg= 1 EA, IM, q10min, PRN  glucagon recombinant 1 mg injection, 1 mg= 1 EA, IM, q10min, PRN  glucose 40% oral gel, 15 gm= 1 tube(s), Oral, As Directed, PRN  insulin lispro 100  units/mL subcutaneous injection, 2-14 units, Subcutaneous, As Directed, PRN  Normal Saline (0.9% NS) IV 1,000 mL, 1000 mL, IV  Plavix 75 mg oral tablet, 75 mg= 1 tab(s), Oral, Daily  Home  aspirin 81 mg oral Delayed Release (EC) tablet, 81 mg= 1 tab(s), Oral, Daily, 3 refills  atorvastatin 80 mg oral tablet, 80 mg= 1 tab(s), Oral, Daily, 11 refills  carvedilol 12.5 mg oral tablet, 12.5 mg= 1 tab(s), Oral, BID, 3 refills  Eliquis 5 mg oral tablet, 5 mg= 1 tab(s), Oral, BID, 6 refills  Entresto 24 mg-26 mg oral tablet, 1 tab(s), Oral, BID  famotidine 20 mg oral tablet, 20 mg= 1 tab(s), Oral, Daily, 1 refills  metFORMIN 1000 mg oral tablet, extended release, 1000 mg= 1 tab(s), Oral, Daily, 1 refills  nitroglycerin 0.4 mg sublingual TAB, 0.4 mg= 1 tab(s), SL, q5min, PRN, 6 refills  Plavix 75 mg oral tablet, 75 mg= 1 tab(s), Oral, Daily, 3 refills  True Metrix Glucometer, See Instructions  True Metrix Lancets, See Instructions, 11 refills  True Metrix Test Strips, See Instructions, 11 refills  Allergies  No Known Allergies  Family History  Aneurysm: Father.  Bipolar: Mother.  Diabetes: Sister.  Schizophrenia: Mother.  Stroke: Father.  Stroke: Father.  Immunizations  Vaccine Date Status   tetanus/diphtheria/pertussis, acel(Tdap) 02/21/2022 Given   COVID-19, vector nr, [id number] - Katharina 06/24/2021 Given   Lab Results  Labs Last 24 Hours?  Chemistry? Hematology/Coagulation?   Sodium Lvl: 138 mmol/L (02/24/22 05:50:00) WBC: 8.8 x10(3)/mcL (02/24/22 05:50:00)   Potassium Lvl:?5.2 mmol/L?High (02/24/22 05:50:00) RBC:?3.93 x10(6)/mcL?Low (02/24/22 05:50:00)   Chloride: 105 mmol/L (02/24/22 05:50:00) Hgb:?12.7 gm/dL?Low (02/24/22 05:50:00)   CO2: 28 mmol/L (02/24/22 05:50:00) Hct:?38.5 %?Low (02/24/22 05:50:00)   Calcium Lvl: 9.7 mg/dL (02/24/22 05:50:00) Platelet: 190 x10(3)/mcL (02/24/22 05:50:00)   Glucose Lvl:?165 mg/dL?High (02/24/22 05:50:00) MCV: 98 fL (02/24/22 05:50:00)   BUN: 21.2 mg/dL (02/24/22 05:50:00) MCH: 32.3 pg  (02/24/22 05:50:00)   Creatinine: 1.16 mg/dL (02/24/22 05:50:00) MCHC: 33 gm/dL (02/24/22 05:50:00)   Est Creat Clearance Ser: 64.65 mL/min (02/24/22 07:42:51) RDW:?14.6 %?High (02/24/22 05:50:00)   BUN/Creat Ratio: 18 (02/24/22 05:50:00) MPV: 9.8 fL (02/24/22 05:50:00)   eGFR-AA:?81?Low (02/24/22 05:50:00) Abs Neut: 5.09 x10(3)/mcL (02/24/22 05:50:00)   eGFR-TOMMY:?67 mL/min/1.73 m2?Low (02/24/22 05:50:00) Neutro Auto: 58 % (02/24/22 05:50:00)    Lymph Auto: 32 % (02/24/22 05:50:00)    Mono Auto: 8 % (02/24/22 05:50:00)    Eos Auto: 2 % (02/24/22 05:50:00)    Abs Eos: 0.2 x10(3)/mcL (02/24/22 05:50:00)    Basophil Auto: 0 % (02/24/22 05:50:00)    Abs Neutro: 5.09 x10(3)/mcL (02/24/22 05:50:00)    Abs Lymph: 2.8 x10(3)/mcL (02/24/22 05:50:00)    Abs Mono: 0.7 x10(3)/mcL (02/24/22 05:50:00)    Abs Baso: 0 x10(3)/mcL (02/24/22 05:50:00)    NRBC%: 0.0 (02/24/22 05:50:00)    IG%: 0 % (02/24/22 05:50:00)    IG#: 0.03 (02/24/22 05:50:00)    POC ACT:?172 second(s)?High (02/24/22 10:53:00)   Diagnostic Results  Accession:?[id number]  Order:?US Arterial Lower Extremity Right  Report Dt/Tm:?02/24/2022 12:49  Report:?  EXAMINATION  US Arterial Lower Extremity Right  INDICATION  Recent catheterization, evaluate pseudoaneurysm  Comparison: None available  TECHNIQUE  Limited grayscale evaluation of the lower extremity vasculature, to  include color and spectral Doppler interrogation.  FINDINGS  The visualized right inguinal and lower extremity vasculature  demonstrates no evidence of focal contour irregularity or other  finding to suggest pseudoaneurysm formation. At the area of catheter  access there is a perivascular area of irregular hypoechogenicity just  deep to the skin surface and measuring approximately 1.7 cm x 0.9 cm x  2.4 cm; abnormality demonstrates through-acoustic enhancement with no  internal vascular flow or surrounding hyperemia.  ?  Doppler interrogation of the right lower extremity arterial  vessels  demonstrates no evidence of abnormally elevated peak systolic  velocity. Normal multiphasic spectral waveforms are present throughout  the femoral arterial tree.  The visualized nonvascular soft tissues are without focal abnormality.  IMPRESSION  ??1. No evidence of pseudoaneurysm, high-grade arterial stenosis, or  other focal vascular abnormality.  ??2. Suspected small superficial hematoma at the site of recent  vascular access.  ?

## 2023-01-01 ENCOUNTER — HOSPITAL ENCOUNTER (INPATIENT)
Facility: HOSPITAL | Age: 68
LOS: 5 days | DRG: 283 | End: 2023-04-12
Attending: EMERGENCY MEDICINE | Admitting: INTERNAL MEDICINE
Payer: MEDICARE

## 2023-01-01 ENCOUNTER — HOSPITAL ENCOUNTER (EMERGENCY)
Facility: HOSPITAL | Age: 68
Discharge: LEFT BEFORE DISCHARGE | End: 2023-02-23
Attending: FAMILY MEDICINE

## 2023-01-01 VITALS
BODY MASS INDEX: 30.04 KG/M2 | HEART RATE: 80 BPM | SYSTOLIC BLOOD PRESSURE: 166 MMHG | TEMPERATURE: 99 F | WEIGHT: 226.63 LBS | DIASTOLIC BLOOD PRESSURE: 93 MMHG | RESPIRATION RATE: 18 BRPM | HEIGHT: 73 IN | OXYGEN SATURATION: 98 %

## 2023-01-01 VITALS
DIASTOLIC BLOOD PRESSURE: 59 MMHG | SYSTOLIC BLOOD PRESSURE: 98 MMHG | HEIGHT: 72 IN | TEMPERATURE: 99 F | WEIGHT: 187.38 LBS | BODY MASS INDEX: 25.38 KG/M2 | OXYGEN SATURATION: 71 %

## 2023-01-01 DIAGNOSIS — N17.9 AKI (ACUTE KIDNEY INJURY): ICD-10-CM

## 2023-01-01 DIAGNOSIS — R55 SYNCOPE: ICD-10-CM

## 2023-01-01 DIAGNOSIS — I50.23 ACUTE ON CHRONIC HFREF (HEART FAILURE WITH REDUCED EJECTION FRACTION): ICD-10-CM

## 2023-01-01 DIAGNOSIS — R06.02 SHORTNESS OF BREATH: ICD-10-CM

## 2023-01-01 DIAGNOSIS — R23.1 PALLOR: ICD-10-CM

## 2023-01-01 DIAGNOSIS — I21.4 NSTEMI (NON-ST ELEVATED MYOCARDIAL INFARCTION): Primary | ICD-10-CM

## 2023-01-01 DIAGNOSIS — Z51.5 PALLIATIVE CARE STATUS: ICD-10-CM

## 2023-01-01 DIAGNOSIS — R74.8 ELEVATED LIVER ENZYMES: ICD-10-CM

## 2023-01-01 DIAGNOSIS — A41.9 SEPSIS: ICD-10-CM

## 2023-01-01 DIAGNOSIS — E86.0 DEHYDRATION: ICD-10-CM

## 2023-01-01 DIAGNOSIS — I51.3 LEFT VENTRICULAR THROMBUS: ICD-10-CM

## 2023-01-01 DIAGNOSIS — J90 BILATERAL PLEURAL EFFUSION: ICD-10-CM

## 2023-01-01 DIAGNOSIS — I48.0 PAROXYSMAL ATRIAL FIBRILLATION: Primary | ICD-10-CM

## 2023-01-01 DIAGNOSIS — E87.0 HYPERNATREMIA: ICD-10-CM

## 2023-01-01 DIAGNOSIS — I50.9 CONGESTIVE HEART FAILURE, UNSPECIFIED HF CHRONICITY, UNSPECIFIED HEART FAILURE TYPE: ICD-10-CM

## 2023-01-01 DIAGNOSIS — R53.1 GENERALIZED WEAKNESS: ICD-10-CM

## 2023-01-01 DIAGNOSIS — R57.9 SHOCK: ICD-10-CM

## 2023-01-01 DIAGNOSIS — R07.9 CHEST PAIN: ICD-10-CM
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
## 2023-01-01 DIAGNOSIS — E87.3 ALKALOSIS: ICD-10-CM

## 2023-01-01 DIAGNOSIS — R23.1 PALLOR OF EXTREMITY: ICD-10-CM

## 2023-01-01 DIAGNOSIS — R60.1 ANASARCA: ICD-10-CM

## 2023-01-01 LAB
ABO + RH BLD: NORMAL
ABO + RH BLD: NORMAL
ABORH RETYPE: NORMAL
ABS NEUT (OLG): 8.7 X10(3)/MCL (ref 2.1–9.2)
ABS NEUT (OLG): 9.87 X10(3)/MCL (ref 2.1–9.2)
ALBUMIN SERPL-MCNC: 1.7 G/DL (ref 3.4–4.8)
ALBUMIN SERPL-MCNC: 1.8 G/DL (ref 3.4–4.8)
ALBUMIN SERPL-MCNC: 2.1 G/DL (ref 3.4–4.8)
ALBUMIN SERPL-MCNC: 2.1 G/DL (ref 3.4–4.8)
ALBUMIN SERPL-MCNC: 2.2 G/DL (ref 3.4–4.8)
ALBUMIN SERPL-MCNC: 2.2 G/DL (ref 3.4–4.8)
ALBUMIN SERPL-MCNC: 2.3 G/DL (ref 3.4–4.8)
ALBUMIN SERPL-MCNC: 3 G/DL (ref 3.4–4.8)
ALBUMIN/GLOB SERPL: 0.5 RATIO (ref 1.1–2)
ALBUMIN/GLOB SERPL: 0.7 RATIO (ref 1.1–2)
ALBUMIN/GLOB SERPL: 0.8 RATIO (ref 1.1–2)
ALBUMIN/GLOB SERPL: 0.9 RATIO (ref 1.1–2)
ALP SERPL-CCNC: 100 UNIT/L (ref 40–150)
ALP SERPL-CCNC: 85 UNIT/L (ref 40–150)
ALP SERPL-CCNC: 86 UNIT/L (ref 40–150)
ALP SERPL-CCNC: 91 UNIT/L (ref 40–150)
ALP SERPL-CCNC: 91 UNIT/L (ref 40–150)
ALP SERPL-CCNC: 92 UNIT/L (ref 40–150)
ALP SERPL-CCNC: 92 UNIT/L (ref 40–150)
ALP SERPL-CCNC: 94 UNIT/L (ref 40–150)
ALP SERPL-CCNC: 96 UNIT/L (ref 40–150)
ALP SERPL-CCNC: 96 UNIT/L (ref 40–150)
ALT SERPL-CCNC: 105 UNIT/L (ref 0–55)
ALT SERPL-CCNC: 128 UNIT/L (ref 0–55)
ALT SERPL-CCNC: 128 UNIT/L (ref 0–55)
ALT SERPL-CCNC: 167 UNIT/L (ref 0–55)
ALT SERPL-CCNC: 176 UNIT/L (ref 0–55)
ALT SERPL-CCNC: 33 UNIT/L (ref 0–55)
ALT SERPL-CCNC: 79 UNIT/L (ref 0–55)
ALT SERPL-CCNC: 85 UNIT/L (ref 0–55)
ALT SERPL-CCNC: 92 UNIT/L (ref 0–55)
ALT SERPL-CCNC: 95 UNIT/L (ref 0–55)
AMPHET UR QL SCN: NEGATIVE
ANION GAP SERPL CALC-SCNC: 10 MEQ/L
ANISOCYTOSIS BLD QL SMEAR: ABNORMAL
ANISOCYTOSIS BLD QL SMEAR: ABNORMAL
APPEARANCE UR: CLEAR
APPEARANCE UR: CLEAR
APTT PPP: 30.9 SECONDS (ref 23.2–33.7)
APTT PPP: 37 SECONDS (ref 23.2–33.7)
APTT PPP: 54.5 SECONDS (ref 23.2–33.7)
APTT PPP: 57.3 SECONDS (ref 23.2–33.7)
APTT PPP: 65.6 SECONDS (ref 23.2–33.7)
AST SERPL-CCNC: 100 UNIT/L (ref 5–34)
AST SERPL-CCNC: 154 UNIT/L (ref 5–34)
AST SERPL-CCNC: 26 UNIT/L (ref 5–34)
AST SERPL-CCNC: 73 UNIT/L (ref 5–34)
AST SERPL-CCNC: 80 UNIT/L (ref 5–34)
AST SERPL-CCNC: 82 UNIT/L (ref 5–34)
AST SERPL-CCNC: 86 UNIT/L (ref 5–34)
AST SERPL-CCNC: 87 UNIT/L (ref 5–34)
AST SERPL-CCNC: 90 UNIT/L (ref 5–34)
AST SERPL-CCNC: 95 UNIT/L (ref 5–34)
AV INDEX (PROSTH): 0.49
AV MEAN GRADIENT: 3 MMHG
AV PEAK GRADIENT: 5 MMHG
AV VALVE AREA: 1.71 CM2
AV VELOCITY RATIO: 0.68
BACTERIA #/AREA URNS AUTO: ABNORMAL /HPF
BACTERIA #/AREA URNS AUTO: NORMAL /HPF
BARBITURATE SCN PRESENT UR: NEGATIVE
BASE EXCESS ARTERIAL: 12.6 MMOL/L (ref -2–2)
BASOPHILS # BLD AUTO: 0.02 X10(3)/MCL (ref 0–0.2)
BASOPHILS # BLD AUTO: 0.04 X10(3)/MCL (ref 0–0.2)
BASOPHILS # BLD AUTO: 0.05 X10(3)/MCL (ref 0–0.2)
BASOPHILS # BLD AUTO: 0.05 X10(3)/MCL (ref 0–0.2)
BASOPHILS # BLD AUTO: 0.08 X10(3)/MCL (ref 0–0.2)
BASOPHILS # BLD AUTO: 0.12 X10(3)/MCL (ref 0–0.2)
BASOPHILS NFR BLD AUTO: 0.2 %
BASOPHILS NFR BLD AUTO: 0.4 %
BASOPHILS NFR BLD AUTO: 0.5 %
BASOPHILS NFR BLD AUTO: 0.6 %
BENZODIAZ UR QL SCN: NEGATIVE
BILIRUB UR QL STRIP.AUTO: ABNORMAL MG/DL
BILIRUB UR QL STRIP.AUTO: NEGATIVE MG/DL
BILIRUBIN DIRECT+TOT PNL SERPL-MCNC: 0.7 MG/DL (ref 0–0.8)
BILIRUBIN DIRECT+TOT PNL SERPL-MCNC: 1.3 MG/DL
BILIRUBIN DIRECT+TOT PNL SERPL-MCNC: 2.2 MG/DL (ref 0–0.8)
BILIRUBIN DIRECT+TOT PNL SERPL-MCNC: 3.2 MG/DL (ref 0–?)
BILIRUBIN DIRECT+TOT PNL SERPL-MCNC: 3.4 MG/DL (ref 0–?)
BILIRUBIN DIRECT+TOT PNL SERPL-MCNC: 3.9 MG/DL
BILIRUBIN DIRECT+TOT PNL SERPL-MCNC: 4.4 MG/DL
BILIRUBIN DIRECT+TOT PNL SERPL-MCNC: 4.6 MG/DL
BILIRUBIN DIRECT+TOT PNL SERPL-MCNC: 4.7 MG/DL (ref 0–?)
BILIRUBIN DIRECT+TOT PNL SERPL-MCNC: 5.2 MG/DL
BILIRUBIN DIRECT+TOT PNL SERPL-MCNC: 5.6 MG/DL
BILIRUBIN DIRECT+TOT PNL SERPL-MCNC: 5.6 MG/DL
BILIRUBIN DIRECT+TOT PNL SERPL-MCNC: 6.4 MG/DL
BILIRUBIN DIRECT+TOT PNL SERPL-MCNC: 6.5 MG/DL
BILIRUBIN DIRECT+TOT PNL SERPL-MCNC: 6.9 MG/DL
BLD PROD TYP BPU: NORMAL
BLD PROD TYP BPU: NORMAL
BLOOD UNIT EXPIRATION DATE: NORMAL
BLOOD UNIT EXPIRATION DATE: NORMAL
BLOOD UNIT TYPE CODE: 6200
BLOOD UNIT TYPE CODE: 8400
BNP BLD-MCNC: 1636.5 PG/ML
BNP BLD-MCNC: 3249.1 PG/ML
BSA FOR ECHO PROCEDURE: 2.17 M2
BUN SERPL-MCNC: 24.2 MG/DL (ref 8.4–25.7)
BUN SERPL-MCNC: 39.3 MG/DL (ref 8.4–25.7)
BUN SERPL-MCNC: 42.5 MG/DL (ref 8.4–25.7)
BUN SERPL-MCNC: 43.2 MG/DL (ref 8.4–25.7)
BUN SERPL-MCNC: 44.3 MG/DL (ref 8.4–25.7)
BUN SERPL-MCNC: 48.7 MG/DL (ref 8.4–25.7)
BUN SERPL-MCNC: 48.9 MG/DL (ref 8.4–25.7)
BUN SERPL-MCNC: 50.7 MG/DL (ref 8.4–25.7)
BUN SERPL-MCNC: 67.3 MG/DL (ref 8.4–25.7)
BURR CELLS (OLG): ABNORMAL
BURR CELLS (OLG): ABNORMAL
CALCIUM SERPL-MCNC: 8.1 MG/DL (ref 8.8–10)
CALCIUM SERPL-MCNC: 8.3 MG/DL (ref 8.8–10)
CALCIUM SERPL-MCNC: 8.4 MG/DL (ref 8.8–10)
CALCIUM SERPL-MCNC: 8.5 MG/DL (ref 8.8–10)
CALCIUM SERPL-MCNC: 8.6 MG/DL (ref 8.8–10)
CALCIUM SERPL-MCNC: 8.6 MG/DL (ref 8.8–10)
CALCIUM SERPL-MCNC: 8.7 MG/DL (ref 8.8–10)
CALCIUM SERPL-MCNC: 8.9 MG/DL (ref 8.8–10)
CALCIUM SERPL-MCNC: 9 MG/DL (ref 8.8–10)
CANNABINOIDS UR QL SCN: NEGATIVE
CARDIOLIPIN IGG SER IA-ACNC: <9.4 GPL
CARDIOLIPIN IGM SER IA-ACNC: <9.4 MPL
CHLORIDE SERPL-SCNC: 105 MMOL/L (ref 98–107)
CHLORIDE SERPL-SCNC: 106 MMOL/L (ref 98–107)
CHLORIDE SERPL-SCNC: 109 MMOL/L (ref 98–107)
CHLORIDE SERPL-SCNC: 110 MMOL/L (ref 98–107)
CHLORIDE SERPL-SCNC: 110 MMOL/L (ref 98–107)
CHLORIDE SERPL-SCNC: 111 MMOL/L (ref 98–107)
CHLORIDE SERPL-SCNC: 112 MMOL/L (ref 98–107)
CHLORIDE SERPL-SCNC: 114 MMOL/L (ref 98–107)
CHLORIDE SERPL-SCNC: 115 MMOL/L (ref 98–107)
CK SERPL-CCNC: 1429 U/L (ref 30–200)
CK SERPL-CCNC: 608 U/L (ref 30–200)
CO2 SERPL-SCNC: 21 MMOL/L (ref 23–31)
CO2 SERPL-SCNC: 22 MMOL/L (ref 23–31)
CO2 SERPL-SCNC: 25 MMOL/L (ref 23–31)
CO2 SERPL-SCNC: 26 MMOL/L (ref 23–31)
CO2 SERPL-SCNC: 29 MMOL/L (ref 23–31)
CO2 SERPL-SCNC: 31 MMOL/L (ref 23–31)
CO2 SERPL-SCNC: 33 MMOL/L (ref 23–31)
CO2 SERPL-SCNC: 37 MMOL/L (ref 23–31)
CO2 SERPL-SCNC: 40 MMOL/L (ref 23–31)
COCAINE UR QL SCN: NEGATIVE
COLOR STL: ABNORMAL
COLOR UR AUTO: ABNORMAL
COLOR UR AUTO: YELLOW
CONSISTENCY STL: ABNORMAL
CORRECTED TEMPERATURE (PCO2): 38 MMHG (ref 35–45)
CORRECTED TEMPERATURE (PCO2): 41 MMHG (ref 35–45)
CORRECTED TEMPERATURE (PCO2): 43 MMHG (ref 35–45)
CORRECTED TEMPERATURE (PCO2): 56 MMHG (ref 19–50)
CORRECTED TEMPERATURE (PH): 7.45 (ref 7.35–7.45)
CORRECTED TEMPERATURE (PH): 7.52 (ref 7.35–7.45)
CORRECTED TEMPERATURE (PH): 7.62 (ref 7.29–7.6)
CORRECTED TEMPERATURE (PH): 7.62 (ref 7.29–7.6)
CORRECTED TEMPERATURE (PO2): 74 MMHG (ref 80–100)
CORRECTED TEMPERATURE (PO2): 75 MMHG (ref 80–100)
CORRECTED TEMPERATURE (PO2): 76 MMHG (ref 80–100)
CORRECTED TEMPERATURE (PO2): 84 MMHG (ref 80–100)
CREAT SERPL-MCNC: 1.06 MG/DL (ref 0.73–1.18)
CREAT SERPL-MCNC: 1.17 MG/DL (ref 0.73–1.18)
CREAT SERPL-MCNC: 1.18 MG/DL (ref 0.73–1.18)
CREAT SERPL-MCNC: 1.22 MG/DL (ref 0.73–1.18)
CREAT SERPL-MCNC: 1.24 MG/DL (ref 0.73–1.18)
CREAT SERPL-MCNC: 1.26 MG/DL (ref 0.73–1.18)
CREAT SERPL-MCNC: 1.29 MG/DL (ref 0.73–1.18)
CREAT SERPL-MCNC: 1.32 MG/DL (ref 0.73–1.18)
CREAT SERPL-MCNC: 1.39 MG/DL (ref 0.73–1.18)
CREAT SERPL-MCNC: 2.08 MG/DL (ref 0.73–1.18)
CREAT UR-MCNC: 5.7 MG/DL (ref 63–166)
CREAT/UREA NIT SERPL: 34
CROSSMATCH INTERPRETATION: NORMAL
CROSSMATCH INTERPRETATION: NORMAL
CV ECHO LV RWT: 0.28 CM
DISPENSE STATUS: NORMAL
DISPENSE STATUS: NORMAL
DOP CALC AO PEAK VEL: 1.17 M/S
DOP CALC AO VTI: 23.1 CM
DOP CALC LVOT AREA: 3.5 CM2
DOP CALC LVOT DIAMETER: 2.1 CM
DOP CALC LVOT PEAK VEL: 0.8 M/S
DOP CALC LVOT STROKE VOLUME: 39.47 CM3
DOP CALC MV VTI: 20.8 CM
DOP CALCLVOT PEAK VEL VTI: 11.4 CM
DRVV CONF RATIO (OHS): 49.9
DRVV NORM RATIO (OHS): 1.15 (ref 0–1.19)
DRVV SCR RATIO (OHS): 57.4
E WAVE DECELERATION TIME: 140 MSEC
E/A RATIO: 1.53
E/E' RATIO: 13.09 M/S
ECHO LV POSTERIOR WALL: 0.92 CM (ref 0.6–1.1)
EJECTION FRACTION: 15 %
EOSINOPHIL # BLD AUTO: 0 X10(3)/MCL (ref 0–0.9)
EOSINOPHIL # BLD AUTO: 0.01 X10(3)/MCL (ref 0–0.9)
EOSINOPHIL # BLD AUTO: 0.02 X10(3)/MCL (ref 0–0.9)
EOSINOPHIL # BLD AUTO: 0.04 X10(3)/MCL (ref 0–0.9)
EOSINOPHIL # BLD AUTO: 0.08 X10(3)/MCL (ref 0–0.9)
EOSINOPHIL # BLD AUTO: 0.08 X10(3)/MCL (ref 0–0.9)
EOSINOPHIL NFR BLD AUTO: 0 %
EOSINOPHIL NFR BLD AUTO: 0.1 %
EOSINOPHIL NFR BLD AUTO: 0.2 %
EOSINOPHIL NFR BLD AUTO: 0.4 %
EOSINOPHIL NFR BLD AUTO: 0.4 %
EOSINOPHIL NFR BLD AUTO: 0.9 %
ERYTHROCYTE [DISTWIDTH] IN BLOOD BY AUTOMATED COUNT: 16.9 % (ref 11.5–17)
ERYTHROCYTE [DISTWIDTH] IN BLOOD BY AUTOMATED COUNT: 20.7 % (ref 11.5–17)
ERYTHROCYTE [DISTWIDTH] IN BLOOD BY AUTOMATED COUNT: 21.1 % (ref 11.5–17)
ERYTHROCYTE [DISTWIDTH] IN BLOOD BY AUTOMATED COUNT: 21.2 % (ref 11.5–17)
ERYTHROCYTE [DISTWIDTH] IN BLOOD BY AUTOMATED COUNT: 21.4 % (ref 11.5–17)
ERYTHROCYTE [DISTWIDTH] IN BLOOD BY AUTOMATED COUNT: 21.5 % (ref 11.5–17)
ERYTHROCYTE [DISTWIDTH] IN BLOOD BY AUTOMATED COUNT: 21.5 % (ref 11.5–17)
ERYTHROCYTE [DISTWIDTH] IN BLOOD BY AUTOMATED COUNT: 23.4 % (ref 11.5–17)
EST. AVERAGE GLUCOSE BLD GHB EST-MCNC: 260.4 MG/DL
FENTANYL UR QL SCN: NEGATIVE
FIBRINOGEN PPP-MCNC: 194 MG/DL (ref 210–463)
FIBRINOGEN PPP-MCNC: 361 MG/DL (ref 210–463)
FIBRINOGEN PPP-MCNC: 406 MG/DL (ref 210–463)
FRACTIONAL SHORTENING: 6 % (ref 28–44)
GFR SERPLBLD CREATININE-BSD FMLA CKD-EPI: 34 MLS/MIN/1.73/M2
GFR SERPLBLD CREATININE-BSD FMLA CKD-EPI: 55 MLS/MIN/1.73/M2
GFR SERPLBLD CREATININE-BSD FMLA CKD-EPI: 58 MLS/MIN/1.73/M2
GFR SERPLBLD CREATININE-BSD FMLA CKD-EPI: 60 MLS/MIN/1.73/M2
GFR SERPLBLD CREATININE-BSD FMLA CKD-EPI: >60 MLS/MIN/1.73/M2
GIANT PLATELETS: ABNORMAL
GLOBULIN SER-MCNC: 2.8 GM/DL (ref 2.4–3.5)
GLOBULIN SER-MCNC: 2.8 GM/DL (ref 2.4–3.5)
GLOBULIN SER-MCNC: 2.9 GM/DL (ref 2.4–3.5)
GLOBULIN SER-MCNC: 2.9 GM/DL (ref 2.4–3.5)
GLOBULIN SER-MCNC: 3.3 GM/DL (ref 2.4–3.5)
GLOBULIN SER-MCNC: 3.3 GM/DL (ref 2.4–3.5)
GLOBULIN SER-MCNC: 3.4 GM/DL (ref 2.4–3.5)
GLOBULIN SER-MCNC: 3.6 GM/DL (ref 2.4–3.5)
GLUCOSE SERPL-MCNC: 132 MG/DL (ref 82–115)
GLUCOSE SERPL-MCNC: 150 MG/DL (ref 82–115)
GLUCOSE SERPL-MCNC: 160 MG/DL (ref 70–110)
GLUCOSE SERPL-MCNC: 182 MG/DL (ref 82–115)
GLUCOSE SERPL-MCNC: 190 MG/DL (ref 82–115)
GLUCOSE SERPL-MCNC: 191 MG/DL (ref 82–115)
GLUCOSE SERPL-MCNC: 210 MG/DL (ref 82–115)
GLUCOSE SERPL-MCNC: 332 MG/DL (ref 82–115)
GLUCOSE UR QL STRIP.AUTO: NEGATIVE MG/DL
GLUCOSE UR QL STRIP.AUTO: NEGATIVE MG/DL
GRAN CASTS URNS QL MICRO: ABNORMAL /LPF
GROUP & RH: NORMAL
HAPTOGLOB SERPL-MCNC: 77 MG/DL (ref 40–368)
HAV IGM SERPL QL IA: NONREACTIVE
HAV IGM SERPL QL IA: NONREACTIVE
HBA1C MFR BLD: 10.7 %
HBV CORE IGM SERPL QL IA: NONREACTIVE
HBV CORE IGM SERPL QL IA: NONREACTIVE
HBV SURFACE AG SERPL QL IA: NONREACTIVE
HBV SURFACE AG SERPL QL IA: NONREACTIVE
HCO3 ARTERIAL: 38.9 MMOL/L (ref 18–23)
HCO3 UR-SCNC: 35.1 MMOL/L (ref 22–26)
HCO3 UR-SCNC: 38.9 MMOL/L (ref 22–26)
HCO3 UR-SCNC: 39.1 MMOL/L (ref 22–26)
HCO3 UR-SCNC: 42.1 MMOL/L (ref 22–26)
HCT VFR BLD AUTO: 35.4 % (ref 42–52)
HCT VFR BLD AUTO: 36.2 % (ref 42–52)
HCT VFR BLD AUTO: 36.2 % (ref 42–52)
HCT VFR BLD AUTO: 36.5 % (ref 42–52)
HCT VFR BLD AUTO: 37.4 % (ref 42–52)
HCT VFR BLD AUTO: 37.6 % (ref 42–52)
HCT VFR BLD AUTO: 37.9 % (ref 42–52)
HCT VFR BLD AUTO: 42.2 % (ref 42–52)
HCV AB SERPL QL IA: REACTIVE
HCV AB SERPL QL IA: REACTIVE
HEMATOLOGIST REVIEW: NORMAL
HEMOCCULT SP1 STL QL: POSITIVE
HGB BLD-MCNC: 10.9 G/DL (ref 12–16)
HGB BLD-MCNC: 11.1 G/DL (ref 14–18)
HGB BLD-MCNC: 11.3 G/DL (ref 14–18)
HGB BLD-MCNC: 11.4 G/DL (ref 14–18)
HGB BLD-MCNC: 11.4 G/DL (ref 14–18)
HGB BLD-MCNC: 11.8 G/DL (ref 12–16)
HGB BLD-MCNC: 12 G/DL (ref 14–18)
HGB BLD-MCNC: 12.2 G/DL (ref 14–18)
HGB BLD-MCNC: 12.4 G/DL (ref 14–18)
HGB BLD-MCNC: 12.5 G/DL (ref 12–16)
HGB BLD-MCNC: 12.8 G/DL (ref 12–16)
HGB BLD-MCNC: 13.1 G/DL (ref 14–18)
HGB BLD-MCNC: ABNORMAL G/DL
HYALINE CASTS URNS QL MICRO: ABNORMAL /LPF
IMM GRANULOCYTES # BLD AUTO: 0.03 X10(3)/MCL (ref 0–0.04)
IMM GRANULOCYTES # BLD AUTO: 0.03 X10(3)/MCL (ref 0–0.04)
IMM GRANULOCYTES # BLD AUTO: 0.18 X10(3)/MCL (ref 0–0.04)
IMM GRANULOCYTES # BLD AUTO: 0.2 X10(3)/MCL (ref 0–0.04)
IMM GRANULOCYTES # BLD AUTO: 0.22 X10(3)/MCL (ref 0–0.04)
IMM GRANULOCYTES # BLD AUTO: 0.34 X10(3)/MCL (ref 0–0.04)
IMM GRANULOCYTES NFR BLD AUTO: 0.2 %
IMM GRANULOCYTES NFR BLD AUTO: 0.4 %
IMM GRANULOCYTES NFR BLD AUTO: 1.4 %
IMM GRANULOCYTES NFR BLD AUTO: 1.6 %
IMM GRANULOCYTES NFR BLD AUTO: 1.6 %
IMM GRANULOCYTES NFR BLD AUTO: 1.7 %
INDIRECT COOMBS GEL: NORMAL
INR BLD: 1.49 (ref 0–1.3)
INR BLD: 2.06 (ref 0–1.3)
INSTRUMENT WBC (OLG): 10.5 X10(3)/MCL
INSTRUMENT WBC (OLG): 8.7 X10(3)/MCL
INTERVENTRICULAR SEPTUM: 1.11 CM (ref 0.6–1.1)
KETONES UR QL STRIP.AUTO: ABNORMAL MG/DL
KETONES UR QL STRIP.AUTO: NEGATIVE MG/DL
L.A. PATH INTERP (OHS): ABNORMAL
LA ST CALC (OHS): 11.6 SECONDS (ref 0–7.9)
LACTATE SERPL-SCNC: 2 MMOL/L (ref 0.5–2.2)
LACTATE SERPL-SCNC: 2.1 MMOL/L (ref 0.5–2.2)
LACTATE SERPL-SCNC: 2.2 MMOL/L (ref 0.5–2.2)
LACTATE SERPL-SCNC: 2.2 MMOL/L (ref 0.5–2.2)
LDH SERPL-CCNC: 475 U/L (ref 125–220)
LEFT ATRIUM SIZE: 5.6 CM
LEFT ATRIUM VOLUME INDEX MOD: 47.9 ML/M2
LEFT ATRIUM VOLUME MOD: 103 CM3
LEFT INTERNAL DIMENSION IN SYSTOLE: 6.07 CM (ref 2.1–4)
LEFT VENTRICLE DIASTOLIC VOLUME INDEX: 99.53 ML/M2
LEFT VENTRICLE DIASTOLIC VOLUME: 214 ML
LEFT VENTRICLE MASS INDEX: 133 G/M2
LEFT VENTRICLE SYSTOLIC VOLUME INDEX: 86 ML/M2
LEFT VENTRICLE SYSTOLIC VOLUME: 185 ML
LEFT VENTRICULAR INTERNAL DIMENSION IN DIASTOLE: 6.47 CM (ref 3.5–6)
LEFT VENTRICULAR MASS: 286.21 G
LEUKOCYTE ESTERASE UR QL STRIP.AUTO: ABNORMAL UNIT/L
LEUKOCYTE ESTERASE UR QL STRIP.AUTO: NEGATIVE UNIT/L
LIPASE SERPL-CCNC: 27 U/L
LV LATERAL E/E' RATIO: 12 M/S
LV SEPTAL E/E' RATIO: 14.4 M/S
LVOT MG: 1 MMHG
LVOT MV: 0.49 CM/S
LYMPHOCYTES # BLD AUTO: 0.58 X10(3)/MCL (ref 0.6–4.6)
LYMPHOCYTES # BLD AUTO: 0.84 X10(3)/MCL (ref 0.6–4.6)
LYMPHOCYTES # BLD AUTO: 1.01 X10(3)/MCL (ref 0.6–4.6)
LYMPHOCYTES # BLD AUTO: 1.1 X10(3)/MCL (ref 0.6–4.6)
LYMPHOCYTES # BLD AUTO: 1.2 X10(3)/MCL (ref 0.6–4.6)
LYMPHOCYTES # BLD AUTO: 1.78 X10(3)/MCL (ref 0.6–4.6)
LYMPHOCYTES NFR BLD AUTO: 20.8 %
LYMPHOCYTES NFR BLD AUTO: 3.8 %
LYMPHOCYTES NFR BLD AUTO: 4.7 %
LYMPHOCYTES NFR BLD AUTO: 8.3 %
LYMPHOCYTES NFR BLD AUTO: 8.4 %
LYMPHOCYTES NFR BLD AUTO: 9.1 %
LYMPHOCYTES NFR BLD MANUAL: 0.42 X10(3)/MCL
LYMPHOCYTES NFR BLD MANUAL: 4 %
MACROCYTES BLD QL SMEAR: ABNORMAL
MACROCYTES BLD QL SMEAR: ABNORMAL
MAGNESIUM SERPL-MCNC: 1.9 MG/DL (ref 1.6–2.6)
MAGNESIUM SERPL-MCNC: 2 MG/DL (ref 1.6–2.6)
MAGNESIUM SERPL-MCNC: 2.1 MG/DL (ref 1.6–2.6)
MCH RBC QN AUTO: 28 PG (ref 27–31)
MCH RBC QN AUTO: 28.7 PG (ref 27–31)
MCH RBC QN AUTO: 28.7 PG (ref 27–31)
MCH RBC QN AUTO: 29.1 PG (ref 27–31)
MCH RBC QN AUTO: 29.1 PG (ref 27–31)
MCH RBC QN AUTO: 29.2 PG (ref 27–31)
MCH RBC QN AUTO: 29.6 PG (ref 27–31)
MCH RBC QN AUTO: 29.7 PG
MCHC RBC AUTO-ENTMCNC: 30.7 G/DL (ref 33–36)
MCHC RBC AUTO-ENTMCNC: 31 G/DL (ref 33–36)
MCHC RBC AUTO-ENTMCNC: 31.2 G/DL (ref 33–36)
MCHC RBC AUTO-ENTMCNC: 31.2 G/DL (ref 33–36)
MCHC RBC AUTO-ENTMCNC: 32.1 G/DL (ref 33–36)
MCHC RBC AUTO-ENTMCNC: 32.2 G/DL (ref 33–36)
MCHC RBC AUTO-ENTMCNC: 32.2 G/DL (ref 33–36)
MCHC RBC AUTO-ENTMCNC: 33 G/DL (ref 33–36)
MCV RBC AUTO: 87 FL (ref 80–94)
MCV RBC AUTO: 89.7 FL (ref 80–94)
MCV RBC AUTO: 90.6 FL (ref 80–94)
MCV RBC AUTO: 90.7 FL (ref 80–94)
MCV RBC AUTO: 91.9 FL (ref 80–94)
MCV RBC AUTO: 92.5 FL (ref 80–94)
MCV RBC AUTO: 93.1 FL (ref 80–94)
MCV RBC AUTO: 96.8 FL (ref 80–94)
MDMA UR QL SCN: NEGATIVE
METAMYELOCYTES NFR BLD MANUAL: 1 %
METAMYELOCYTES NFR BLD MANUAL: 3 %
MONOCYTES # BLD AUTO: 0.61 X10(3)/MCL (ref 0.1–1.3)
MONOCYTES # BLD AUTO: 0.69 X10(3)/MCL (ref 0.1–1.3)
MONOCYTES # BLD AUTO: 0.73 X10(3)/MCL (ref 0.1–1.3)
MONOCYTES # BLD AUTO: 0.95 X10(3)/MCL (ref 0.1–1.3)
MONOCYTES # BLD AUTO: 1.08 X10(3)/MCL (ref 0.1–1.3)
MONOCYTES # BLD AUTO: 1.19 X10(3)/MCL (ref 0.1–1.3)
MONOCYTES NFR BLD AUTO: 3.3 %
MONOCYTES NFR BLD AUTO: 5.5 %
MONOCYTES NFR BLD AUTO: 7.2 %
MONOCYTES NFR BLD AUTO: 8.1 %
MONOCYTES NFR BLD AUTO: 8.3 %
MONOCYTES NFR BLD AUTO: 8.8 %
MONOCYTES NFR BLD MANUAL: 0.09 X10(3)/MCL (ref 0.1–1.3)
MONOCYTES NFR BLD MANUAL: 0.21 X10(3)/MCL (ref 0.1–1.3)
MONOCYTES NFR BLD MANUAL: 1 %
MONOCYTES NFR BLD MANUAL: 2 %
MV MEAN GRADIENT: 2 MMHG
MV PEAK A VEL: 0.47 M/S
MV PEAK E VEL: 0.72 M/S
MV PEAK GRADIENT: 4 MMHG
MV STENOSIS PRESSURE HALF TIME: 66 MS
MV VALVE AREA BY CONTINUITY EQUATION: 1.9 CM2
MV VALVE AREA P 1/2 METHOD: 3.33 CM2
MYELOCYTES NFR BLD MANUAL: 2 %
NEUTROPHILS # BLD AUTO: 10.58 X10(3)/MCL (ref 2.1–9.2)
NEUTROPHILS # BLD AUTO: 10.9 X10(3)/MCL (ref 2.1–9.2)
NEUTROPHILS # BLD AUTO: 11.59 X10(3)/MCL (ref 2.1–9.2)
NEUTROPHILS # BLD AUTO: 19.74 X10(3)/MCL (ref 2.1–9.2)
NEUTROPHILS # BLD AUTO: 5.92 X10(3)/MCL (ref 2.1–9.2)
NEUTROPHILS # BLD AUTO: 9.17 X10(3)/MCL (ref 2.1–9.2)
NEUTROPHILS NFR BLD AUTO: 69.3 %
NEUTROPHILS NFR BLD AUTO: 81.2 %
NEUTROPHILS NFR BLD AUTO: 82.2 %
NEUTROPHILS NFR BLD AUTO: 82.9 %
NEUTROPHILS NFR BLD AUTO: 86.1 %
NEUTROPHILS NFR BLD AUTO: 90.4 %
NEUTROPHILS NFR BLD MANUAL: 91 %
NEUTROPHILS NFR BLD MANUAL: 97 %
NITRITE UR QL STRIP.AUTO: NEGATIVE
NITRITE UR QL STRIP.AUTO: POSITIVE
NRBC BLD AUTO-RTO: 0 %
NRBC BLD AUTO-RTO: 0.2 %
NRBC BLD AUTO-RTO: 0.4 %
NRBC BLD AUTO-RTO: 0.5 %
NRBC BLD AUTO-RTO: 0.7 %
NRBC BLD AUTO-RTO: 1.2 %
NRBC BLD AUTO-RTO: 1.3 %
NRBC BLD AUTO-RTO: 1.4 %
NRBC BLD MANUAL-RTO: 4 %
NRBC BLD MANUAL-RTO: 5 %
OPIATES UR QL SCN: NEGATIVE
PATH REV: NORMAL
PCO2 BLDA: 38 MMHG (ref 35–45)
PCO2 BLDA: 41 MMHG (ref 35–45)
PCO2 BLDA: 43 MMHG (ref 35–45)
PCO2 BLDA: 56 MMHG (ref 19–50)
PCO2 BLDA: ABNORMAL MM[HG]
PCO2 BLDA: ABNORMAL MM[HG]
PCP UR QL: NEGATIVE
PF4 HEPARIN CMPLX IGG SER-IMP: NORMAL
PF4 HEPARIN CMPLX IGG SERPL IA: <0.05 OD
PF4 HEPARIN CMPLX IGG SERPL QL IA: NEGATIVE
PH SMN: 7.45 [PH] (ref 7.35–7.45)
PH SMN: 7.52 [PH] (ref 7.35–7.45)
PH SMN: 7.62 [PH] (ref 7.29–7.6)
PH SMN: 7.62 [PH] (ref 7.29–7.6)
PH SMN: ABNORMAL [PH]
PH UR STRIP.AUTO: 5 [PH]
PH UR STRIP.AUTO: 5.5 [PH]
PH UR: 5.5 [PH] (ref 3–11)
PHOSPHATE SERPL-MCNC: 2.6 MG/DL (ref 2.3–4.7)
PHOSPHATE SERPL-MCNC: 4 MG/DL (ref 2.3–4.7)
PISA TR MAX VEL: 2.47 M/S
PLATELET # BLD AUTO: 105 X10(3)/MCL (ref 130–400)
PLATELET # BLD AUTO: 261 X10(3)/MCL (ref 130–400)
PLATELET # BLD AUTO: 40 X10(3)/MCL (ref 130–400)
PLATELET # BLD AUTO: 52 X10(3)/MCL (ref 130–400)
PLATELET # BLD AUTO: 54 X10(3)/MCL (ref 130–400)
PLATELET # BLD AUTO: 55 X10(3)/MCL (ref 130–400)
PLATELET # BLD AUTO: 57 X10(3)/MCL (ref 130–400)
PLATELET # BLD AUTO: 75 X10(3)/MCL (ref 130–400)
PLATELET # BLD EST: ABNORMAL 10*3/UL
PLATELET # BLD EST: ABNORMAL 10*3/UL
PMV BLD AUTO: 10.4 FL (ref 7.4–10.4)
PMV BLD AUTO: 11.3 FL (ref 7.4–10.4)
PMV BLD AUTO: 13.5 FL (ref 7.4–10.4)
PMV BLD AUTO: ABNORMAL FL
PO2 BLDA: 74 MMHG (ref 80–100)
PO2 BLDA: 75 MMHG (ref 80–100)
PO2 BLDA: 76 MMHG (ref 80–100)
PO2 BLDA: 84 MMHG (ref 80–100)
PO2 BLDA: ABNORMAL MM[HG]
POC BASE DEFICIT: 11 MMOL/L (ref -2–2)
POC BASE DEFICIT: 12.6 MMOL/L (ref -2–2)
POC BASE DEFICIT: 16.5 MMOL/L (ref -2–2)
POC BASE DEFICIT: 19 MMOL/L (ref -2–2)
POC COHB: 2.3 %
POC COHB: 2.5 %
POC COHB: 2.8 %
POC COHB: 2.9 %
POC COHB: ABNORMAL
POC FIO2: ABNORMAL
POC IONIZED CALCIUM: 1.09 MMOL/L (ref 1.12–1.23)
POC IONIZED CALCIUM: 1.11 MMOL/L (ref 1.12–1.23)
POC IONIZED CALCIUM: 1.15 MMOL/L (ref 1.12–1.23)
POC IONIZED CALCIUM: 1.15 MMOL/L (ref 1.12–1.23)
POC IONIZED CALCIUM: ABNORMAL
POC METHB: 0.5 % (ref 0.4–1.5)
POC METHB: 0.9 % (ref 0.4–1.5)
POC METHB: 1.1 % (ref 0.4–1.5)
POC METHB: 1.4 % (ref 0.4–1.5)
POC METHB: ABNORMAL
POC O2HB: 92.9 % (ref 94–97)
POC O2HB: 93.8 % (ref 94–97)
POC O2HB: 94.2 % (ref 94–97)
POC O2HB: 95 % (ref 94–97)
POC O2HB: ABNORMAL
POC SATURATED O2: 96.2 %
POC SATURATED O2: 96.7 %
POC SATURATED O2: 97.2 %
POC SATURATED O2: 97.2 %
POC TEMPERATURE: 37 °C
POCT GLUCOSE: 130 MG/DL (ref 70–110)
POCT GLUCOSE: 141 MG/DL (ref 70–110)
POCT GLUCOSE: 144 MG/DL (ref 70–110)
POCT GLUCOSE: 147 MG/DL (ref 70–110)
POCT GLUCOSE: 152 MG/DL (ref 70–110)
POCT GLUCOSE: 156 MG/DL (ref 70–110)
POCT GLUCOSE: 160 MG/DL (ref 70–110)
POCT GLUCOSE: 173 MG/DL (ref 70–110)
POCT GLUCOSE: 209 MG/DL (ref 70–110)
POCT GLUCOSE: 225 MG/DL (ref 70–110)
POIKILOCYTOSIS BLD QL SMEAR: ABNORMAL
POIKILOCYTOSIS BLD QL SMEAR: ABNORMAL
POLYCHROMASIA BLD QL SMEAR: ABNORMAL
POTASSIUM BLD-SCNC: 2.7 MMOL/L (ref 3.5–5)
POTASSIUM BLD-SCNC: 3.3 MMOL/L (ref 3.5–5)
POTASSIUM BLD-SCNC: 3.5 MMOL/L (ref 3.5–5)
POTASSIUM BLD-SCNC: 3.9 MMOL/L (ref 3.5–5)
POTASSIUM BLD-SCNC: ABNORMAL MMOL/L
POTASSIUM SERPL-SCNC: 2.5 MMOL/L (ref 3.5–5.1)
POTASSIUM SERPL-SCNC: 2.8 MMOL/L (ref 3.5–5.1)
POTASSIUM SERPL-SCNC: 3.3 MMOL/L (ref 3.5–5.1)
POTASSIUM SERPL-SCNC: 3.8 MMOL/L (ref 3.5–5.1)
POTASSIUM SERPL-SCNC: 4.1 MMOL/L (ref 3.5–5.1)
POTASSIUM SERPL-SCNC: 4.2 MMOL/L (ref 3.5–5.1)
POTASSIUM SERPL-SCNC: 4.3 MMOL/L (ref 3.5–5.1)
POTASSIUM SERPL-SCNC: 4.4 MMOL/L (ref 3.5–5.1)
POTASSIUM SERPL-SCNC: 4.7 MMOL/L (ref 3.5–5.1)
PROT SERPL-MCNC: 4.8 GM/DL (ref 5.8–7.6)
PROT SERPL-MCNC: 4.9 GM/DL (ref 5.8–7.6)
PROT SERPL-MCNC: 5 GM/DL (ref 5.8–7.6)
PROT SERPL-MCNC: 5.2 GM/DL (ref 5.8–7.6)
PROT SERPL-MCNC: 5.2 GM/DL (ref 5.8–7.6)
PROT SERPL-MCNC: 5.3 GM/DL (ref 5.8–7.6)
PROT SERPL-MCNC: 5.3 GM/DL (ref 5.8–7.6)
PROT SERPL-MCNC: 6.3 GM/DL (ref 5.8–7.6)
PROT UR QL STRIP.AUTO: ABNORMAL MG/DL
PROT UR QL STRIP.AUTO: NEGATIVE MG/DL
PROT UR STRIP-MCNC: <6.8 MG/DL
PROTHROMBIN TIME: 17.8 SECONDS (ref 12.5–14.5)
PROTHROMBIN TIME: 22.8 SECONDS (ref 12.5–14.5)
RA PRESSURE: 15 MMHG
RBC # BLD AUTO: 3.74 X10(6)/MCL (ref 4.7–6.1)
RBC # BLD AUTO: 3.92 X10(6)/MCL (ref 4.7–6.1)
RBC # BLD AUTO: 3.94 X10(6)/MCL (ref 4.7–6.1)
RBC # BLD AUTO: 4.07 X10(6)/MCL (ref 4.7–6.1)
RBC # BLD AUTO: 4.13 X10(6)/MCL (ref 4.7–6.1)
RBC # BLD AUTO: 4.18 X10(6)/MCL (ref 4.7–6.1)
RBC # BLD AUTO: 4.19 X10(6)/MCL (ref 4.7–6.1)
RBC # BLD AUTO: 4.56 X10(6)/MCL (ref 4.7–6.1)
RBC #/AREA URNS AUTO: <5 /HPF
RBC #/AREA URNS AUTO: ABNORMAL /HPF
RBC MORPH BLD: ABNORMAL
RBC MORPH BLD: ABNORMAL
RBC UR QL AUTO: ABNORMAL UNIT/L
RBC UR QL AUTO: NEGATIVE UNIT/L
SATURATED O2 ARTERIAL, I-STAT: ABNORMAL
SINUS: 4 CM
SODIUM BLD-SCNC: 148 MMOL/L (ref 137–145)
SODIUM BLD-SCNC: 148 MMOL/L (ref 137–145)
SODIUM BLD-SCNC: 149 MMOL/L (ref 137–145)
SODIUM BLD-SCNC: 149 MMOL/L (ref 137–145)
SODIUM BLD-SCNC: ABNORMAL MMOL/L
SODIUM SERPL-SCNC: 138 MMOL/L (ref 136–145)
SODIUM SERPL-SCNC: 146 MMOL/L (ref 136–145)
SODIUM SERPL-SCNC: 149 MMOL/L (ref 136–145)
SODIUM SERPL-SCNC: 153 MMOL/L (ref 136–145)
SODIUM SERPL-SCNC: 154 MMOL/L (ref 136–145)
SODIUM SERPL-SCNC: 155 MMOL/L (ref 136–145)
SODIUM SERPL-SCNC: 156 MMOL/L (ref 136–145)
SODIUM SERPL-SCNC: 156 MMOL/L (ref 136–145)
SODIUM SERPL-SCNC: 157 MMOL/L (ref 136–145)
SODIUM UR-SCNC: 106 MMOL/L
SP GR UR STRIP.AUTO: 1.01 (ref 1–1.03)
SP GR UR STRIP.AUTO: >1.03 (ref 1–1.03)
SPECIMEN OUTDATE: NORMAL
SPECIMEN SOURCE: ABNORMAL
SQUAMOUS #/AREA URNS AUTO: <5 /HPF
SQUAMOUS #/AREA URNS AUTO: ABNORMAL /HPF
TARGETS BLD QL SMEAR: ABNORMAL
TARGETS BLD QL SMEAR: ABNORMAL
TDI LATERAL: 0.06 M/S
TDI SEPTAL: 0.05 M/S
TDI: 0.06 M/S
TR MAX PG: 24 MMHG
TROPONIN I SERPL-MCNC: 0.09 NG/ML (ref 0–0.04)
TROPONIN I SERPL-MCNC: 1.06 NG/ML (ref 0–0.04)
TROPONIN I SERPL-MCNC: 1.27 NG/ML (ref 0–0.04)
TROPONIN I SERPL-MCNC: 1.29 NG/ML (ref 0–0.04)
TROPONIN I SERPL-MCNC: 1.93 NG/ML (ref 0–0.04)
TROPONIN I SERPL-MCNC: 2.02 NG/ML (ref 0–0.04)
TROPONIN I SERPL-MCNC: 2.1 NG/ML (ref 0–0.04)
TT IMM BOVINE THROMBIN PPP: 18 SECONDS (ref 0–22)
TV REST PULMONARY ARTERY PRESSURE: 39 MMHG
UNIT NUMBER: NORMAL
UNIT NUMBER: NORMAL
UROBILINOGEN UR STRIP-ACNC: 0.2 MG/DL
UROBILINOGEN UR STRIP-ACNC: 1 MG/DL
UUN UR-MCNC: 128 MG/DL
VANCOMYCIN SERPL-MCNC: 20.6 UG/ML (ref 15–20)
VIT B12 SERPL-MCNC: >2000 PG/ML (ref 213–816)
WBC # SPEC AUTO: 10.5 X10(3)/MCL (ref 4.5–11.5)
WBC # SPEC AUTO: 11.1 X10(3)/MCL (ref 4.5–11.5)
WBC # SPEC AUTO: 12.3 X10(3)/MCL (ref 4.5–11.5)
WBC # SPEC AUTO: 13.2 X10(3)/MCL (ref 4.5–11.5)
WBC # SPEC AUTO: 14.3 X10(3)/MCL (ref 4.5–11.5)
WBC # SPEC AUTO: 21.9 X10(3)/MCL (ref 4.5–11.5)
WBC # SPEC AUTO: 8.5 X10(3)/MCL (ref 4.5–11.5)
WBC # SPEC AUTO: 8.7 X10(3)/MCL (ref 4.5–11.5)
WBC #/AREA URNS AUTO: <5 /HPF
WBC #/AREA URNS AUTO: ABNORMAL /HPF

## 2023-01-01 PROCEDURE — 80053 COMPREHEN METABOLIC PANEL: CPT | Performed by: PHYSICIAN ASSISTANT

## 2023-01-01 PROCEDURE — 84484 ASSAY OF TROPONIN QUANT: CPT | Performed by: EMERGENCY MEDICINE

## 2023-01-01 PROCEDURE — 82270 OCCULT BLOOD FECES: CPT | Performed by: INTERNAL MEDICINE

## 2023-01-01 PROCEDURE — 85060 BLOOD SMEAR INTERPRETATION: CPT | Performed by: INTERNAL MEDICINE

## 2023-01-01 PROCEDURE — 27000221 HC OXYGEN, UP TO 24 HOURS

## 2023-01-01 PROCEDURE — 25000003 PHARM REV CODE 250: Performed by: INTERNAL MEDICINE

## 2023-01-01 PROCEDURE — 82803 BLOOD GASES ANY COMBINATION: CPT

## 2023-01-01 PROCEDURE — 86900 BLOOD TYPING SEROLOGIC ABO: CPT | Performed by: INTERNAL MEDICINE

## 2023-01-01 PROCEDURE — 63600175 PHARM REV CODE 636 W HCPCS

## 2023-01-01 PROCEDURE — 96365 THER/PROPH/DIAG IV INF INIT: CPT

## 2023-01-01 PROCEDURE — 99223 PR INITIAL HOSPITAL CARE,LEVL III: ICD-10-PCS | Mod: ,,, | Performed by: STUDENT IN AN ORGANIZED HEALTH CARE EDUCATION/TRAINING PROGRAM

## 2023-01-01 PROCEDURE — 99233 SBSQ HOSP IP/OBS HIGH 50: CPT | Mod: ,,, | Performed by: INTERNAL MEDICINE

## 2023-01-01 PROCEDURE — 81001 URINALYSIS AUTO W/SCOPE: CPT | Performed by: STUDENT IN AN ORGANIZED HEALTH CARE EDUCATION/TRAINING PROGRAM

## 2023-01-01 PROCEDURE — 99221 1ST HOSP IP/OBS SF/LOW 40: CPT | Mod: ,,, | Performed by: INTERNAL MEDICINE

## 2023-01-01 PROCEDURE — 84100 ASSAY OF PHOSPHORUS: CPT | Performed by: STUDENT IN AN ORGANIZED HEALTH CARE EDUCATION/TRAINING PROGRAM

## 2023-01-01 PROCEDURE — 21400001 HC TELEMETRY ROOM

## 2023-01-01 PROCEDURE — 99285 EMERGENCY DEPT VISIT HI MDM: CPT | Mod: 25

## 2023-01-01 PROCEDURE — 85730 THROMBOPLASTIN TIME PARTIAL: CPT | Performed by: EMERGENCY MEDICINE

## 2023-01-01 PROCEDURE — 63600175 PHARM REV CODE 636 W HCPCS: Performed by: INTERNAL MEDICINE

## 2023-01-01 PROCEDURE — 85610 PROTHROMBIN TIME: CPT | Performed by: INTERNAL MEDICINE

## 2023-01-01 PROCEDURE — 84484 ASSAY OF TROPONIN QUANT: CPT | Performed by: STUDENT IN AN ORGANIZED HEALTH CARE EDUCATION/TRAINING PROGRAM

## 2023-01-01 PROCEDURE — 93010 ELECTROCARDIOGRAM REPORT: CPT | Mod: ,,, | Performed by: INTERNAL MEDICINE

## 2023-01-01 PROCEDURE — 93010 EKG 12-LEAD: ICD-10-PCS | Mod: ,,, | Performed by: INTERNAL MEDICINE

## 2023-01-01 PROCEDURE — 36600 WITHDRAWAL OF ARTERIAL BLOOD: CPT

## 2023-01-01 PROCEDURE — A4216 STERILE WATER/SALINE, 10 ML: HCPCS | Performed by: INTERNAL MEDICINE

## 2023-01-01 PROCEDURE — 99221 PR INITIAL HOSPITAL CARE,LEVL I: ICD-10-PCS | Mod: ,,, | Performed by: INTERNAL MEDICINE

## 2023-01-01 PROCEDURE — 85384 FIBRINOGEN ACTIVITY: CPT | Performed by: INTERNAL MEDICINE

## 2023-01-01 PROCEDURE — 84484 ASSAY OF TROPONIN QUANT: CPT

## 2023-01-01 PROCEDURE — 85670 THROMBIN TIME PLASMA: CPT | Performed by: INTERNAL MEDICINE

## 2023-01-01 PROCEDURE — 25000003 PHARM REV CODE 250: Performed by: EMERGENCY MEDICINE

## 2023-01-01 PROCEDURE — 83605 ASSAY OF LACTIC ACID: CPT | Performed by: INTERNAL MEDICINE

## 2023-01-01 PROCEDURE — 99900035 HC TECH TIME PER 15 MIN (STAT)

## 2023-01-01 PROCEDURE — 83615 LACTATE (LD) (LDH) ENZYME: CPT | Performed by: INTERNAL MEDICINE

## 2023-01-01 PROCEDURE — 99497 PR ADVNCD CARE PLAN 30 MIN: ICD-10-PCS | Mod: ,,, | Performed by: INTERNAL MEDICINE

## 2023-01-01 PROCEDURE — C1751 CATH, INF, PER/CENT/MIDLINE: HCPCS

## 2023-01-01 PROCEDURE — 11000001 HC ACUTE MED/SURG PRIVATE ROOM

## 2023-01-01 PROCEDURE — 93005 ELECTROCARDIOGRAM TRACING: CPT

## 2023-01-01 PROCEDURE — 83735 ASSAY OF MAGNESIUM: CPT | Performed by: INTERNAL MEDICINE

## 2023-01-01 PROCEDURE — 80053 COMPREHEN METABOLIC PANEL: CPT | Performed by: EMERGENCY MEDICINE

## 2023-01-01 PROCEDURE — 25000003 PHARM REV CODE 250

## 2023-01-01 PROCEDURE — 80307 DRUG TEST PRSMV CHEM ANLYZR: CPT | Performed by: INTERNAL MEDICINE

## 2023-01-01 PROCEDURE — 36430 TRANSFUSION BLD/BLD COMPNT: CPT

## 2023-01-01 PROCEDURE — 82565 ASSAY OF CREATININE: CPT | Performed by: INTERNAL MEDICINE

## 2023-01-01 PROCEDURE — 25000003 PHARM REV CODE 250: Performed by: STUDENT IN AN ORGANIZED HEALTH CARE EDUCATION/TRAINING PROGRAM

## 2023-01-01 PROCEDURE — 85025 COMPLETE CBC W/AUTO DIFF WBC: CPT | Performed by: STUDENT IN AN ORGANIZED HEALTH CARE EDUCATION/TRAINING PROGRAM

## 2023-01-01 PROCEDURE — 85027 COMPLETE CBC AUTOMATED: CPT | Performed by: PHYSICIAN ASSISTANT

## 2023-01-01 PROCEDURE — 82248 BILIRUBIN DIRECT: CPT | Performed by: INTERNAL MEDICINE

## 2023-01-01 PROCEDURE — 82550 ASSAY OF CK (CPK): CPT | Performed by: STUDENT IN AN ORGANIZED HEALTH CARE EDUCATION/TRAINING PROGRAM

## 2023-01-01 PROCEDURE — C9113 INJ PANTOPRAZOLE SODIUM, VIA: HCPCS | Performed by: INTERNAL MEDICINE

## 2023-01-01 PROCEDURE — 20000000 HC ICU ROOM

## 2023-01-01 PROCEDURE — 85025 COMPLETE CBC W/AUTO DIFF WBC: CPT | Performed by: PHYSICIAN ASSISTANT

## 2023-01-01 PROCEDURE — 85025 COMPLETE CBC W/AUTO DIFF WBC: CPT | Performed by: INTERNAL MEDICINE

## 2023-01-01 PROCEDURE — 82570 ASSAY OF URINE CREATININE: CPT | Performed by: INTERNAL MEDICINE

## 2023-01-01 PROCEDURE — 81001 URINALYSIS AUTO W/SCOPE: CPT | Performed by: INTERNAL MEDICINE

## 2023-01-01 PROCEDURE — 99497 ADVNCD CARE PLAN 30 MIN: CPT | Mod: ,,, | Performed by: INTERNAL MEDICINE

## 2023-01-01 PROCEDURE — 25000003 PHARM REV CODE 250: Performed by: NURSE PRACTITIONER

## 2023-01-01 PROCEDURE — 83880 ASSAY OF NATRIURETIC PEPTIDE: CPT | Performed by: INTERNAL MEDICINE

## 2023-01-01 PROCEDURE — 92610 EVALUATE SWALLOWING FUNCTION: CPT

## 2023-01-01 PROCEDURE — 84520 ASSAY OF UREA NITROGEN: CPT | Performed by: INTERNAL MEDICINE

## 2023-01-01 PROCEDURE — 96361 HYDRATE IV INFUSION ADD-ON: CPT

## 2023-01-01 PROCEDURE — 63600175 PHARM REV CODE 636 W HCPCS: Performed by: EMERGENCY MEDICINE

## 2023-01-01 PROCEDURE — 80202 ASSAY OF VANCOMYCIN: CPT | Performed by: INTERNAL MEDICINE

## 2023-01-01 PROCEDURE — 85025 COMPLETE CBC W/AUTO DIFF WBC: CPT | Performed by: EMERGENCY MEDICINE

## 2023-01-01 PROCEDURE — 86023 IMMUNOGLOBULIN ASSAY: CPT | Performed by: INTERNAL MEDICINE

## 2023-01-01 PROCEDURE — S5010 5% DEXTROSE AND 0.45% SALINE: HCPCS | Performed by: STUDENT IN AN ORGANIZED HEALTH CARE EDUCATION/TRAINING PROGRAM

## 2023-01-01 PROCEDURE — 83690 ASSAY OF LIPASE: CPT | Performed by: EMERGENCY MEDICINE

## 2023-01-01 PROCEDURE — 63600175 PHARM REV CODE 636 W HCPCS: Performed by: STUDENT IN AN ORGANIZED HEALTH CARE EDUCATION/TRAINING PROGRAM

## 2023-01-01 PROCEDURE — 80076 HEPATIC FUNCTION PANEL: CPT | Performed by: INTERNAL MEDICINE

## 2023-01-01 PROCEDURE — 80053 COMPREHEN METABOLIC PANEL: CPT | Performed by: STUDENT IN AN ORGANIZED HEALTH CARE EDUCATION/TRAINING PROGRAM

## 2023-01-01 PROCEDURE — 83735 ASSAY OF MAGNESIUM: CPT | Performed by: NURSE PRACTITIONER

## 2023-01-01 PROCEDURE — 99223 1ST HOSP IP/OBS HIGH 75: CPT | Mod: ,,, | Performed by: STUDENT IN AN ORGANIZED HEALTH CARE EDUCATION/TRAINING PROGRAM

## 2023-01-01 PROCEDURE — 83010 ASSAY OF HAPTOGLOBIN QUANT: CPT | Performed by: INTERNAL MEDICINE

## 2023-01-01 PROCEDURE — 94761 N-INVAS EAR/PLS OXIMETRY MLT: CPT

## 2023-01-01 PROCEDURE — 85730 THROMBOPLASTIN TIME PARTIAL: CPT | Performed by: INTERNAL MEDICINE

## 2023-01-01 PROCEDURE — 99233 PR SUBSEQUENT HOSPITAL CARE,LEVL III: ICD-10-PCS | Mod: ,,, | Performed by: INTERNAL MEDICINE

## 2023-01-01 PROCEDURE — 96375 TX/PRO/DX INJ NEW DRUG ADDON: CPT

## 2023-01-01 PROCEDURE — 82550 ASSAY OF CK (CPK): CPT | Performed by: EMERGENCY MEDICINE

## 2023-01-01 PROCEDURE — S5010 5% DEXTROSE AND 0.45% SALINE: HCPCS | Performed by: INTERNAL MEDICINE

## 2023-01-01 PROCEDURE — 80053 COMPREHEN METABOLIC PANEL: CPT | Performed by: INTERNAL MEDICINE

## 2023-01-01 PROCEDURE — 86148 ANTI-PHOSPHOLIPID ANTIBODY: CPT | Mod: 90 | Performed by: INTERNAL MEDICINE

## 2023-01-01 PROCEDURE — 25500020 PHARM REV CODE 255: Performed by: INTERNAL MEDICINE

## 2023-01-01 PROCEDURE — 85613 RUSSELL VIPER VENOM DILUTED: CPT | Performed by: INTERNAL MEDICINE

## 2023-01-01 PROCEDURE — 84300 ASSAY OF URINE SODIUM: CPT | Performed by: INTERNAL MEDICINE

## 2023-01-01 PROCEDURE — 87040 BLOOD CULTURE FOR BACTERIA: CPT | Performed by: INTERNAL MEDICINE

## 2023-01-01 PROCEDURE — 80074 ACUTE HEPATITIS PANEL: CPT | Performed by: INTERNAL MEDICINE

## 2023-01-01 PROCEDURE — 25500020 PHARM REV CODE 255: Performed by: EMERGENCY MEDICINE

## 2023-01-01 PROCEDURE — 83880 ASSAY OF NATRIURETIC PEPTIDE: CPT | Performed by: PHYSICIAN ASSISTANT

## 2023-01-01 PROCEDURE — 82607 VITAMIN B-12: CPT | Performed by: INTERNAL MEDICINE

## 2023-01-01 PROCEDURE — 83036 HEMOGLOBIN GLYCOSYLATED A1C: CPT | Performed by: INTERNAL MEDICINE

## 2023-01-01 PROCEDURE — 63600175 PHARM REV CODE 636 W HCPCS: Performed by: NURSE PRACTITIONER

## 2023-01-01 PROCEDURE — 63600175 PHARM REV CODE 636 W HCPCS: Performed by: PHYSICIAN ASSISTANT

## 2023-01-01 PROCEDURE — 99231 SBSQ HOSP IP/OBS SF/LOW 25: CPT | Mod: ,,, | Performed by: STUDENT IN AN ORGANIZED HEALTH CARE EDUCATION/TRAINING PROGRAM

## 2023-01-01 PROCEDURE — 99231 PR SUBSEQUENT HOSPITAL CARE,LEVL I: ICD-10-PCS | Mod: ,,, | Performed by: STUDENT IN AN ORGANIZED HEALTH CARE EDUCATION/TRAINING PROGRAM

## 2023-01-01 PROCEDURE — 36569 INSJ PICC 5 YR+ W/O IMAGING: CPT

## 2023-01-01 PROCEDURE — 99232 PR SUBSEQUENT HOSPITAL CARE,LEVL II: ICD-10-PCS | Mod: ,,, | Performed by: INTERNAL MEDICINE

## 2023-01-01 PROCEDURE — 84484 ASSAY OF TROPONIN QUANT: CPT | Performed by: PHYSICIAN ASSISTANT

## 2023-01-01 PROCEDURE — 85027 COMPLETE CBC AUTOMATED: CPT | Performed by: INTERNAL MEDICINE

## 2023-01-01 PROCEDURE — 96367 TX/PROPH/DG ADDL SEQ IV INF: CPT

## 2023-01-01 PROCEDURE — 85610 PROTHROMBIN TIME: CPT | Performed by: EMERGENCY MEDICINE

## 2023-01-01 PROCEDURE — 96374 THER/PROPH/DIAG INJ IV PUSH: CPT

## 2023-01-01 PROCEDURE — 99232 SBSQ HOSP IP/OBS MODERATE 35: CPT | Mod: ,,, | Performed by: INTERNAL MEDICINE

## 2023-01-01 PROCEDURE — 85025 COMPLETE CBC W/AUTO DIFF WBC: CPT | Performed by: NURSE PRACTITIONER

## 2023-01-01 PROCEDURE — 86022 PLATELET ANTIBODIES: CPT | Mod: 90 | Performed by: INTERNAL MEDICINE

## 2023-01-01 PROCEDURE — 96366 THER/PROPH/DIAG IV INF ADDON: CPT

## 2023-01-01 PROCEDURE — P9035 PLATELET PHERES LEUKOREDUCED: HCPCS | Performed by: INTERNAL MEDICINE

## 2023-01-01 PROCEDURE — 84100 ASSAY OF PHOSPHORUS: CPT | Performed by: INTERNAL MEDICINE

## 2023-01-01 RX ORDER — PROCHLORPERAZINE EDISYLATE 5 MG/ML
5 INJECTION INTRAMUSCULAR; INTRAVENOUS EVERY 6 HOURS PRN
Status: DISCONTINUED | OUTPATIENT
Start: 2023-01-01 | End: 2023-01-01 | Stop reason: HOSPADM

## 2023-01-01 RX ORDER — MORPHINE SULFATE 4 MG/ML
2 INJECTION, SOLUTION INTRAMUSCULAR; INTRAVENOUS
Status: DISCONTINUED | OUTPATIENT
Start: 2023-01-01 | End: 2023-01-01 | Stop reason: HOSPADM

## 2023-01-01 RX ORDER — ACETAMINOPHEN 500 MG
1000 TABLET ORAL EVERY 6 HOURS PRN
Status: DISCONTINUED | OUTPATIENT
Start: 2023-01-01 | End: 2023-01-01 | Stop reason: HOSPADM

## 2023-01-01 RX ORDER — NOREPINEPHRINE BITARTRATE/D5W 8 MG/250ML
0-3 PLASTIC BAG, INJECTION (ML) INTRAVENOUS CONTINUOUS
Status: DISCONTINUED | OUTPATIENT
Start: 2023-01-01 | End: 2023-01-01

## 2023-01-01 RX ORDER — SODIUM CHLORIDE 0.9 % (FLUSH) 0.9 %
10 SYRINGE (ML) INJECTION
Status: DISCONTINUED | OUTPATIENT
Start: 2023-01-01 | End: 2023-01-01 | Stop reason: HOSPADM

## 2023-01-01 RX ORDER — HYDRALAZINE HYDROCHLORIDE 20 MG/ML
10 INJECTION INTRAMUSCULAR; INTRAVENOUS EVERY 4 HOURS PRN
Status: DISCONTINUED | OUTPATIENT
Start: 2023-01-01 | End: 2023-01-01 | Stop reason: HOSPADM

## 2023-01-01 RX ORDER — FUROSEMIDE 10 MG/ML
80 INJECTION INTRAMUSCULAR; INTRAVENOUS 2 TIMES DAILY
Status: DISCONTINUED | OUTPATIENT
Start: 2023-01-01 | End: 2023-01-01

## 2023-01-01 RX ORDER — GLYCOPYRROLATE 0.2 MG/ML
400 INJECTION INTRAMUSCULAR; INTRAVENOUS EVERY 6 HOURS PRN
Status: DISCONTINUED | OUTPATIENT
Start: 2023-01-01 | End: 2023-01-01 | Stop reason: HOSPADM

## 2023-01-01 RX ORDER — MAG HYDROX/ALUMINUM HYD/SIMETH 200-200-20
30 SUSPENSION, ORAL (FINAL DOSE FORM) ORAL 4 TIMES DAILY PRN
Status: DISCONTINUED | OUTPATIENT
Start: 2023-01-01 | End: 2023-01-01

## 2023-01-01 RX ORDER — ACETAMINOPHEN 325 MG/1
650 TABLET ORAL EVERY 4 HOURS PRN
Status: DISCONTINUED | OUTPATIENT
Start: 2023-01-01 | End: 2023-01-01 | Stop reason: HOSPADM

## 2023-01-01 RX ORDER — PANTOPRAZOLE SODIUM 40 MG/10ML
40 INJECTION, POWDER, LYOPHILIZED, FOR SOLUTION INTRAVENOUS 2 TIMES DAILY
Status: DISCONTINUED | OUTPATIENT
Start: 2023-01-01 | End: 2023-01-01 | Stop reason: HOSPADM

## 2023-01-01 RX ORDER — ONDANSETRON 2 MG/ML
4 INJECTION INTRAMUSCULAR; INTRAVENOUS EVERY 4 HOURS PRN
Status: DISCONTINUED | OUTPATIENT
Start: 2023-01-01 | End: 2023-01-01 | Stop reason: HOSPADM

## 2023-01-01 RX ORDER — MUPIROCIN 20 MG/G
OINTMENT TOPICAL 2 TIMES DAILY
Status: DISCONTINUED | OUTPATIENT
Start: 2023-01-01 | End: 2023-01-01 | Stop reason: HOSPADM

## 2023-01-01 RX ORDER — DEXTROSE MONOHYDRATE 50 MG/ML
INJECTION, SOLUTION INTRAVENOUS CONTINUOUS
Status: DISCONTINUED | OUTPATIENT
Start: 2023-01-01 | End: 2023-01-01

## 2023-01-01 RX ORDER — SODIUM CHLORIDE 0.9 % (FLUSH) 0.9 %
10 SYRINGE (ML) INJECTION EVERY 6 HOURS
Status: DISCONTINUED | OUTPATIENT
Start: 2023-01-01 | End: 2023-01-01 | Stop reason: HOSPADM

## 2023-01-01 RX ORDER — POLYETHYLENE GLYCOL 3350 17 G/17G
17 POWDER, FOR SOLUTION ORAL 2 TIMES DAILY PRN
Status: DISCONTINUED | OUTPATIENT
Start: 2023-01-01 | End: 2023-01-01 | Stop reason: HOSPADM

## 2023-01-01 RX ORDER — HYDROCODONE BITARTRATE AND ACETAMINOPHEN 500; 5 MG/1; MG/1
TABLET ORAL
Status: DISCONTINUED | OUTPATIENT
Start: 2023-01-01 | End: 2023-01-01 | Stop reason: HOSPADM

## 2023-01-01 RX ORDER — POTASSIUM CHLORIDE 14.9 MG/ML
40 INJECTION INTRAVENOUS ONCE
Status: COMPLETED | OUTPATIENT
Start: 2023-01-01 | End: 2023-01-01

## 2023-01-01 RX ORDER — AMOXICILLIN 250 MG
2 CAPSULE ORAL 2 TIMES DAILY PRN
Status: DISCONTINUED | OUTPATIENT
Start: 2023-01-01 | End: 2023-01-01 | Stop reason: HOSPADM

## 2023-01-01 RX ORDER — GLUCAGON 1 MG
1 KIT INJECTION
Status: DISCONTINUED | OUTPATIENT
Start: 2023-01-01 | End: 2023-01-01 | Stop reason: HOSPADM

## 2023-01-01 RX ORDER — POTASSIUM CHLORIDE 20 MEQ/1
40 TABLET, EXTENDED RELEASE ORAL ONCE
Status: DISCONTINUED | OUTPATIENT
Start: 2023-01-01 | End: 2023-01-01

## 2023-01-01 RX ORDER — DEXTROSE MONOHYDRATE AND SODIUM CHLORIDE 5; .45 G/100ML; G/100ML
INJECTION, SOLUTION INTRAVENOUS CONTINUOUS
Status: DISCONTINUED | OUTPATIENT
Start: 2023-01-01 | End: 2023-01-01

## 2023-01-01 RX ORDER — PANTOPRAZOLE SODIUM 40 MG/10ML
40 INJECTION, POWDER, LYOPHILIZED, FOR SOLUTION INTRAVENOUS DAILY
Status: DISCONTINUED | OUTPATIENT
Start: 2023-01-01 | End: 2023-01-01

## 2023-01-01 RX ORDER — SODIUM CHLORIDE, SODIUM LACTATE, POTASSIUM CHLORIDE, CALCIUM CHLORIDE 600; 310; 30; 20 MG/100ML; MG/100ML; MG/100ML; MG/100ML
INJECTION, SOLUTION INTRAVENOUS CONTINUOUS
Status: DISCONTINUED | OUTPATIENT
Start: 2023-01-01 | End: 2023-01-01

## 2023-01-01 RX ORDER — FUROSEMIDE 10 MG/ML
40 INJECTION INTRAMUSCULAR; INTRAVENOUS
Status: COMPLETED | OUTPATIENT
Start: 2023-01-01 | End: 2023-01-01

## 2023-01-01 RX ORDER — VANCOMYCIN HCL IN 5 % DEXTROSE 1G/250ML
1000 PLASTIC BAG, INJECTION (ML) INTRAVENOUS
Status: DISCONTINUED | OUTPATIENT
Start: 2023-01-01 | End: 2023-01-01

## 2023-01-01 RX ORDER — VANCOMYCIN HCL IN 5 % DEXTROSE 1G/250ML
2000 PLASTIC BAG, INJECTION (ML) INTRAVENOUS
Status: COMPLETED | OUTPATIENT
Start: 2023-01-01 | End: 2023-01-01

## 2023-01-01 RX ORDER — CARVEDILOL 12.5 MG/1
12.5 TABLET ORAL 2 TIMES DAILY
Status: DISCONTINUED | OUTPATIENT
Start: 2023-01-01 | End: 2023-01-01

## 2023-01-01 RX ORDER — CLONIDINE HYDROCHLORIDE 0.2 MG/1
0.2 TABLET ORAL 3 TIMES DAILY PRN
Status: DISCONTINUED | OUTPATIENT
Start: 2023-01-01 | End: 2023-01-01

## 2023-01-01 RX ORDER — MICONAZOLE NITRATE 2 %
POWDER (GRAM) TOPICAL 2 TIMES DAILY
Status: DISCONTINUED | OUTPATIENT
Start: 2023-01-01 | End: 2023-01-01 | Stop reason: HOSPADM

## 2023-01-01 RX ORDER — POTASSIUM CHLORIDE 14.9 MG/ML
40 INJECTION INTRAVENOUS EVERY 4 HOURS
Status: COMPLETED | OUTPATIENT
Start: 2023-01-01 | End: 2023-01-01

## 2023-01-01 RX ORDER — DEXTROSE MONOHYDRATE, SODIUM CHLORIDE, AND POTASSIUM CHLORIDE 50; 2.98; 4.5 G/1000ML; G/1000ML; G/1000ML
INJECTION, SOLUTION INTRAVENOUS CONTINUOUS
Status: DISCONTINUED | OUTPATIENT
Start: 2023-01-01 | End: 2023-01-01

## 2023-01-01 RX ORDER — POTASSIUM CHLORIDE 14.9 MG/ML
20 INJECTION INTRAVENOUS ONCE
Status: DISCONTINUED | OUTPATIENT
Start: 2023-01-01 | End: 2023-01-01

## 2023-01-01 RX ORDER — TALC
6 POWDER (GRAM) TOPICAL NIGHTLY PRN
Status: DISCONTINUED | OUTPATIENT
Start: 2023-01-01 | End: 2023-01-01 | Stop reason: HOSPADM

## 2023-01-01 RX ORDER — FUROSEMIDE 10 MG/ML
80 INJECTION INTRAMUSCULAR; INTRAVENOUS ONCE
Status: COMPLETED | OUTPATIENT
Start: 2023-01-01 | End: 2023-01-01

## 2023-01-01 RX ORDER — INSULIN ASPART 100 [IU]/ML
0-5 INJECTION, SOLUTION INTRAVENOUS; SUBCUTANEOUS EVERY 6 HOURS PRN
Status: DISCONTINUED | OUTPATIENT
Start: 2023-01-01 | End: 2023-01-01 | Stop reason: HOSPADM

## 2023-01-01 RX ORDER — POTASSIUM CHLORIDE 14.9 MG/ML
40 INJECTION INTRAVENOUS ONCE
Status: DISCONTINUED | OUTPATIENT
Start: 2023-01-01 | End: 2023-01-01

## 2023-01-01 RX ORDER — ASPIRIN 325 MG
325 TABLET ORAL
Status: COMPLETED | OUTPATIENT
Start: 2023-01-01 | End: 2023-01-01

## 2023-01-01 RX ORDER — ASPIRIN 325 MG
325 TABLET ORAL
Status: DISCONTINUED | OUTPATIENT
Start: 2023-01-01 | End: 2023-01-01

## 2023-01-01 RX ADMIN — IOPAMIDOL 100 ML: 755 INJECTION, SOLUTION INTRAVENOUS at 01:04

## 2023-01-01 RX ADMIN — NITROGLYCERIN 1 INCH: 20 OINTMENT TOPICAL at 01:04

## 2023-01-01 RX ADMIN — CARVEDILOL 12.5 MG: 12.5 TABLET, FILM COATED ORAL at 01:04

## 2023-01-01 RX ADMIN — NITROGLYCERIN 1 INCH: 20 OINTMENT TOPICAL at 05:04

## 2023-01-01 RX ADMIN — FUROSEMIDE 80 MG: 10 INJECTION, SOLUTION INTRAMUSCULAR; INTRAVENOUS at 06:04

## 2023-01-01 RX ADMIN — SODIUM CHLORIDE, POTASSIUM CHLORIDE, SODIUM LACTATE AND CALCIUM CHLORIDE: 600; 310; 30; 20 INJECTION, SOLUTION INTRAVENOUS at 08:04

## 2023-01-01 RX ADMIN — PIPERACILLIN AND TAZOBACTAM 4.5 G: 4; .5 INJECTION, POWDER, LYOPHILIZED, FOR SOLUTION INTRAVENOUS; PARENTERAL at 03:04

## 2023-01-01 RX ADMIN — DEXTROSE AND SODIUM CHLORIDE: 5; 450 INJECTION, SOLUTION INTRAVENOUS at 08:04

## 2023-01-01 RX ADMIN — Medication: at 09:04

## 2023-01-01 RX ADMIN — FUROSEMIDE 40 MG: 10 INJECTION, SOLUTION INTRAMUSCULAR; INTRAVENOUS at 04:02

## 2023-01-01 RX ADMIN — FUROSEMIDE 80 MG: 10 INJECTION, SOLUTION INTRAMUSCULAR; INTRAVENOUS at 05:04

## 2023-01-01 RX ADMIN — VANCOMYCIN HYDROCHLORIDE 1750 MG: 500 INJECTION, POWDER, LYOPHILIZED, FOR SOLUTION INTRAVENOUS at 08:04

## 2023-01-01 RX ADMIN — SODIUM CHLORIDE, PRESERVATIVE FREE 10 ML: 5 INJECTION INTRAVENOUS at 01:04

## 2023-01-01 RX ADMIN — SODIUM CHLORIDE, PRESERVATIVE FREE 10 ML: 5 INJECTION INTRAVENOUS at 11:04

## 2023-01-01 RX ADMIN — NITROGLYCERIN 1 INCH: 20 OINTMENT TOPICAL at 12:04

## 2023-01-01 RX ADMIN — SODIUM CHLORIDE, POTASSIUM CHLORIDE, SODIUM LACTATE AND CALCIUM CHLORIDE 1000 ML: 600; 310; 30; 20 INJECTION, SOLUTION INTRAVENOUS at 10:04

## 2023-01-01 RX ADMIN — POTASSIUM CHLORIDE 40 MEQ: 14.9 INJECTION, SOLUTION INTRAVENOUS at 05:04

## 2023-01-01 RX ADMIN — MUPIROCIN: 20 OINTMENT TOPICAL at 08:04

## 2023-01-01 RX ADMIN — SODIUM CHLORIDE, PRESERVATIVE FREE 10 ML: 5 INJECTION INTRAVENOUS at 06:04

## 2023-01-01 RX ADMIN — SACUBITRIL AND VALSARTAN 1 TABLET: 24; 26 TABLET, FILM COATED ORAL at 01:04

## 2023-01-01 RX ADMIN — SODIUM CHLORIDE 1000 ML: 9 INJECTION, SOLUTION INTRAVENOUS at 12:04

## 2023-01-01 RX ADMIN — NITROGLYCERIN 1 INCH: 20 OINTMENT TOPICAL at 11:04

## 2023-01-01 RX ADMIN — FUROSEMIDE 80 MG: 10 INJECTION, SOLUTION INTRAMUSCULAR; INTRAVENOUS at 01:04

## 2023-01-01 RX ADMIN — MUPIROCIN: 20 OINTMENT TOPICAL at 12:04

## 2023-01-01 RX ADMIN — FUROSEMIDE 80 MG: 10 INJECTION, SOLUTION INTRAMUSCULAR; INTRAVENOUS at 10:04

## 2023-01-01 RX ADMIN — FUROSEMIDE 80 MG: 10 INJECTION, SOLUTION INTRAMUSCULAR; INTRAVENOUS at 11:04

## 2023-01-01 RX ADMIN — PIPERACILLIN AND TAZOBACTAM 4.5 G: 4; .5 INJECTION, POWDER, LYOPHILIZED, FOR SOLUTION INTRAVENOUS; PARENTERAL at 09:04

## 2023-01-01 RX ADMIN — SODIUM CHLORIDE, PRESERVATIVE FREE 10 ML: 5 INJECTION INTRAVENOUS at 12:04

## 2023-01-01 RX ADMIN — SACUBITRIL AND VALSARTAN 1 TABLET: 24; 26 TABLET, FILM COATED ORAL at 08:04

## 2023-01-01 RX ADMIN — NOREPINEPHRINE BITARTRATE 0.01 MCG/KG/MIN: 8 INJECTION, SOLUTION INTRAVENOUS at 02:04

## 2023-01-01 RX ADMIN — METHYLPREDNISOLONE SODIUM SUCCINATE 80 MG: 40 INJECTION, POWDER, FOR SOLUTION INTRAMUSCULAR; INTRAVENOUS at 08:04

## 2023-01-01 RX ADMIN — PANTOPRAZOLE SODIUM 40 MG: 40 INJECTION, POWDER, FOR SOLUTION INTRAVENOUS at 08:04

## 2023-01-01 RX ADMIN — INSULIN ASPART 4 UNITS: 100 INJECTION, SOLUTION INTRAVENOUS; SUBCUTANEOUS at 08:04

## 2023-01-01 RX ADMIN — PIPERACILLIN AND TAZOBACTAM 4.5 G: 4; .5 INJECTION, POWDER, LYOPHILIZED, FOR SOLUTION INTRAVENOUS; PARENTERAL at 05:04

## 2023-01-01 RX ADMIN — MICONAZOLE NITRATE: 20 POWDER TOPICAL at 08:04

## 2023-01-01 RX ADMIN — PERFLUTREN 2 ML: 6.52 INJECTION, SUSPENSION INTRAVENOUS at 03:04

## 2023-01-01 RX ADMIN — NITROGLYCERIN 1 INCH: 20 OINTMENT TOPICAL at 06:04

## 2023-01-01 RX ADMIN — DEXTROSE MONOHYDRATE: 50 INJECTION, SOLUTION INTRAVENOUS at 07:04

## 2023-01-01 RX ADMIN — PIPERACILLIN AND TAZOBACTAM 4.5 G: 4; .5 INJECTION, POWDER, LYOPHILIZED, FOR SOLUTION INTRAVENOUS; PARENTERAL at 01:04

## 2023-01-01 RX ADMIN — POTASSIUM CHLORIDE 40 MEQ: 14.9 INJECTION, SOLUTION INTRAVENOUS at 10:04

## 2023-01-01 RX ADMIN — CARVEDILOL 12.5 MG: 12.5 TABLET, FILM COATED ORAL at 08:04

## 2023-01-01 RX ADMIN — DEXTROSE AND SODIUM CHLORIDE: 5; 450 INJECTION, SOLUTION INTRAVENOUS at 05:04

## 2023-01-01 RX ADMIN — METHYLPREDNISOLONE SODIUM SUCCINATE 80 MG: 40 INJECTION, POWDER, FOR SOLUTION INTRAMUSCULAR; INTRAVENOUS at 07:04

## 2023-01-01 RX ADMIN — MORPHINE SULFATE 2 MG: 4 INJECTION INTRAVENOUS at 12:04

## 2023-01-01 RX ADMIN — SODIUM CHLORIDE, PRESERVATIVE FREE 10 ML: 5 INJECTION INTRAVENOUS at 05:04

## 2023-01-01 RX ADMIN — LORAZEPAM 1 MG: 2 INJECTION INTRAMUSCULAR; INTRAVENOUS at 12:04

## 2023-01-01 RX ADMIN — BIVALIRUDIN 0.08 MG/KG/HR: 250 INJECTION, POWDER, LYOPHILIZED, FOR SOLUTION INTRAVENOUS at 06:04

## 2023-01-01 RX ADMIN — MICONAZOLE NITRATE: 20 POWDER TOPICAL at 12:04

## 2023-01-01 RX ADMIN — SODIUM CHLORIDE, POTASSIUM CHLORIDE, SODIUM LACTATE AND CALCIUM CHLORIDE 1000 ML: 600; 310; 30; 20 INJECTION, SOLUTION INTRAVENOUS at 08:04

## 2023-01-01 RX ADMIN — POTASSIUM CHLORIDE 40 MEQ: 14.9 INJECTION, SOLUTION INTRAVENOUS at 07:04

## 2023-01-01 RX ADMIN — SODIUM CHLORIDE, POTASSIUM CHLORIDE, SODIUM LACTATE AND CALCIUM CHLORIDE: 600; 310; 30; 20 INJECTION, SOLUTION INTRAVENOUS at 01:04

## 2023-01-01 RX ADMIN — POTASSIUM CHLORIDE 40 MEQ: 14.9 INJECTION, SOLUTION INTRAVENOUS at 06:04

## 2023-01-01 RX ADMIN — VANCOMYCIN HYDROCHLORIDE 2000 MG: 1 INJECTION, POWDER, LYOPHILIZED, FOR SOLUTION INTRAVENOUS at 04:04

## 2023-01-01 RX ADMIN — IOPAMIDOL 100 ML: 755 INJECTION, SOLUTION INTRAVENOUS at 03:04

## 2023-01-01 RX ADMIN — PIPERACILLIN AND TAZOBACTAM 4.5 G: 4; .5 INJECTION, POWDER, LYOPHILIZED, FOR SOLUTION INTRAVENOUS; PARENTERAL at 04:04

## 2023-01-01 RX ADMIN — PIPERACILLIN AND TAZOBACTAM 4.5 G: 4; .5 INJECTION, POWDER, LYOPHILIZED, FOR SOLUTION INTRAVENOUS; PARENTERAL at 02:04

## 2023-01-01 RX ADMIN — Medication: at 08:04

## 2023-01-01 RX ADMIN — DEXTROSE AND SODIUM CHLORIDE: 5; 450 INJECTION, SOLUTION INTRAVENOUS at 06:04

## 2023-01-01 RX ADMIN — SODIUM CHLORIDE, POTASSIUM CHLORIDE, SODIUM LACTATE AND CALCIUM CHLORIDE 500 ML: 600; 310; 30; 20 INJECTION, SOLUTION INTRAVENOUS at 10:04

## 2023-01-01 RX ADMIN — MICONAZOLE NITRATE: 20 POWDER TOPICAL at 10:04

## 2023-01-01 RX ADMIN — ASPIRIN 325 MG ORAL TABLET 325 MG: 325 PILL ORAL at 02:04

## 2023-02-23 PROBLEM — E66.9 OBESITY: Status: ACTIVE | Noted: 2023-01-01

## 2023-02-23 PROBLEM — I50.20 HEART FAILURE WITH REDUCED EJECTION FRACTION: Status: ACTIVE | Noted: 2021-04-20

## 2023-02-23 PROBLEM — I73.9 PERIPHERAL ARTERIAL DISEASE: Status: ACTIVE | Noted: 2023-01-01

## 2023-02-23 NOTE — ED PROVIDER NOTES
"Encounter Date: 2/23/2023       History     Chief Complaint   Patient presents with    Leg Swelling     Darren Patel is a 67 y.o. male with a history of HTN, T2DM, CAD, afib (on eliquis), CHF (EF 40% in 2020) who presents to the ED complaining of a rash and swelling to his bilateral lower extremities. Patient reports working in his yard over the weekend when he cut down a branch that looked like poison ivy. He has a red itchy rash to both of his legs that have been weeping clear fluid. Last night he developed swelling in his scrotum which is what really caused him to seek evaluation today. He also reports shortness of breath when walking a short distance which started 3 weeks ago. Pt reports he has  never had swelling in his scrotum, but has had it in his legs before and he was told it was from his heart. He stopped taking all of his mediations months ago because he got "fed up with this hospital". He denies fevers, chills, chest pain, SOB at rest, N/V/D, dysuria.    The history is provided by the patient. No  was used.   Review of patient's allergies indicates:  No Known Allergies  Past Medical History:   Diagnosis Date    Diabetes mellitus     High cholesterol     Hypertension      Past Surgical History:   Procedure Laterality Date    A-V CARDIAC PACEMAKER INSERTION Left 11/9/2020    Procedure: INSERTION, CARDIAC PACEMAKER, DUAL CHAMBER;  Surgeon: Nolan Hernandez MD;  Location: [facility] CATH LAB;  Service: Cardiology;  Laterality: Left;    CORONARY ANGIOGRAPHY N/A 11/9/2020    Procedure: ANGIOGRAM, CORONARY ARTERY - right radial;  Surgeon: Nolan Hernandez MD;  Location: [facility] CATH LAB;  Service: Cardiology;  Laterality: N/A;     Family History   Family history unknown: Yes     Social History     Tobacco Use    Smoking status: Some Days     Packs/day: 1.50     Types: Cigarettes    Smokeless tobacco: Never   Substance Use Topics    Alcohol use: Never     Comment: occasionally    Drug use: Never "     Review of Systems   Constitutional:  Negative for activity change, chills and fever.   HENT:  Negative for congestion and trouble swallowing.    Eyes:  Negative for photophobia and visual disturbance.   Respiratory:  Positive for shortness of breath (with exertion). Negative for chest tightness and wheezing.    Cardiovascular:  Positive for leg swelling. Negative for chest pain and palpitations.   Gastrointestinal:  Negative for abdominal pain, constipation, diarrhea, nausea and vomiting.   Genitourinary:  Positive for scrotal swelling. Negative for dysuria, frequency, hematuria and urgency.   Musculoskeletal:  Negative for arthralgias, back pain and gait problem.   Skin:  Positive for color change and rash.   Neurological:  Negative for dizziness, syncope, weakness, light-headedness, numbness and headaches.   Psychiatric/Behavioral:  Negative for agitation and confusion. The patient is not nervous/anxious.      Physical Exam     Initial Vitals [02/23/23 1352]   BP Pulse Resp Temp SpO2   (!) 166/93 80 18 98.9 °F (37.2 °C) 98 %      MAP       --         Physical Exam    Nursing note and vitals reviewed.  Constitutional: He appears well-developed and well-nourished. No distress.   HENT:   Head: Normocephalic and atraumatic.   Mouth/Throat: No oropharyngeal exudate.   Eyes: EOM are normal. No scleral icterus.   Neck: Neck supple.   Normal range of motion.  Cardiovascular:  Normal rate.           No murmur heard.  Pulmonary/Chest: No respiratory distress. He has no wheezes. He has rales (bilateral lower and mid lungs).   Abdominal: Abdomen is soft. He exhibits no distension. There is no abdominal tenderness.   Genitourinary: Cremasteric reflex is present. Right testis shows swelling. Right testis shows no tenderness. Left testis shows swelling. Left testis shows no tenderness.    Genitourinary Comments: ROSE Aguilera present as chaperone. Significant scrotal edema on exam. No warmth, erythema, or tenderness.       Musculoskeletal:         General: Normal range of motion.      Cervical back: Normal range of motion and neck supple.      Right lower le+ Pitting Edema present.      Left lower le+ Pitting Edema present.     Neurological: He is alert and oriented to person, place, and time. No cranial nerve deficit.   Skin: Skin is warm and dry. Capillary refill takes less than 2 seconds. No erythema.   Psychiatric: He has a normal mood and affect. Thought content normal.       ED Course   Procedures  Labs Reviewed   COMPREHENSIVE METABOLIC PANEL - Abnormal; Notable for the following components:       Result Value    Glucose Level 190 (*)     Creatinine 1.22 (*)     Albumin Level 3.0 (*)     Albumin/Globulin Ratio 0.9 (*)     All other components within normal limits   B-TYPE NATRIURETIC PEPTIDE - Abnormal; Notable for the following components:    Natriuretic Peptide 1,636.5 (*)     All other components within normal limits   TROPONIN I - Abnormal; Notable for the following components:    Troponin-I 0.088 (*)     All other components within normal limits   CBC WITH DIFFERENTIAL - Abnormal; Notable for the following components:    RBC 3.74 (*)     Hgb 11.1 (*)     Hct 36.2 (*)     MCV 96.8 (*)     MCHC 30.7 (*)     All other components within normal limits   CBC W/ AUTO DIFFERENTIAL    Narrative:     The following orders were created for panel order CBC auto differential.  Procedure                               Abnormality         Status                     ---------                               -----------         ------                     CBC with Differential[[id number]]        Abnormal            Final result                 Please view results for these tests on the individual orders.   EXTRA TUBES    Narrative:     The following orders were created for panel order EXTRA TUBES.  Procedure                               Abnormality         Status                     ---------                                -----------         ------                     Light Blue Top Hold[[id number]]                              In process                   Please view results for these tests on the individual orders.   LIGHT BLUE TOP HOLD          Imaging Results              X-Ray Chest AP Portable (Final result)  Result time 02/23/23 15:44:24      Final result by Colton Laguna MD (02/23/23 15:44:24)                   Impression:      Changes suggestive of right-sided pleural effusion.    Otherwise no active pulmonary disease and no significant change as compared with the previous exam      Electronically signed by: Colton Laguna  Date:    02/23/2023  Time:    15:44               Narrative:    EXAMINATION:  XR CHEST AP PORTABLE    CLINICAL HISTORY:  Shortness of breath    TECHNIQUE:  Single frontal view of the chest was performed.    COMPARISON:  December 30, 2020    FINDINGS:  Examination reveals mediastinal silhouette to be within normal limits cardiac silhouette is mildly enlarged there is some blunting of the right costophrenic angle indicating the presence of a small right-sided pleural effusion lung fields are otherwise clear and free of gross infiltrates or atelectasis.    No other significant change as compared with the previous exam                                       Medications   aspirin tablet 325 mg (has no administration in time range)   furosemide injection 40 mg (40 mg Intravenous Given 2/23/23 1628)                 ED Course as of 02/23/23 1641   Thu Feb 23, 2023   1639 CXR with pleural effusion. BNP 1,600. Trop 0.088. Will give lasix 40 IV. Discussed with Dr. Rojas who recommends admission. Medicine consulted for acute CHF exacerbation. [KD]      ED Course User Index  [KD] Ernestine Dawson PA-C                 Clinical Impression:   Final diagnoses:  [R06.02] Shortness of breath  [I21.4] NSTEMI (non-ST elevated myocardial infarction) (Primary)  [I50.9] Congestive heart failure, unspecified HF  chronicity, unspecified heart failure type        ED Disposition Condition    Admit Stable                Ernestine Dawson PA-C  02/23/23 1317

## 2023-02-23 NOTE — DISCHARGE SUMMARY
"AMA NOTE      I was called by ED staff to admit Mr. Patel for CHF Exacerbation.  When I arrived at room FT5, patient was standing up with his belongings ready to leave.  States "this is not a [facility] and I can leave if I want to."         This patient is choosing to leave against medical advice. I have personally explained to patient the risks and that choosing to do so may result in permanent bodily harm or even death. Discussed at great length that without further evaluation and monitoring there may have unforeseen circumstances and/or deterioration causing permanent bodily harm or death as a result of leaving against medical advice. Patient verbalizes these risks back to me in layman terms. Patient still desires to leave against medical advice. Patient is alert, oriented, and shows the mental capacity to make clear decisions regarding their health care at this time.  Advise patient to return to ED at any time immediately if they change their mind at any time or if condition begins to worsen or change in anyway.      Jazlyn Summers M.D.  [facility]    "

## 2023-02-23 NOTE — LETTER
Patient: Darren Patel  YOB: 1955  Date: 2/23/2023 Time: 5:21 PM  Location: [facility]    Leaving the Hospital Against Medical Advice    Chart #:[id number]    This will certify that I, the undersigned,    ______________________________________________________________________    A patient in the above named medical center, having requested discharge and removal from the medical center against the advice of my attending physician(s), hereby release [facility], its physicians, officers and employees, severally and individually, from any and all liability of any nature whatsoever for any injury or harm or complication of any kind that may result directly or indirectly, by reason of my terminating my stay as a patient at [facility] and my departure from [facility], and hereby waive any and all rights of action I may now have or later acquire as a result of my voluntary departure from [facility] and the termination of my stay as a patient therein.    This release is made with the full knowledge of the danger that may result from the action which I am taking.      Date:_______________________                         ___________________________                                                                                    Patient/Legal Representative    Witness:        ____________________________                          ___________________________  Nurse                                                                        Physician

## 2023-04-07 PROBLEM — D69.6 THROMBOCYTOPENIA: Status: ACTIVE | Noted: 2023-01-01

## 2023-04-07 PROBLEM — I51.3 LV (LEFT VENTRICULAR) MURAL THROMBUS: Status: ACTIVE | Noted: 2023-01-01

## 2023-04-07 PROBLEM — K76.1 HEPATIC CONGESTION: Status: ACTIVE | Noted: 2023-01-01

## 2023-04-07 PROBLEM — I50.23 ACUTE ON CHRONIC HFREF (HEART FAILURE WITH REDUCED EJECTION FRACTION): Status: ACTIVE | Noted: 2023-01-01

## 2023-04-07 NOTE — ED PROVIDER NOTES
Encounter Date: 4/7/2023       History     Chief Complaint   Patient presents with    Fall     Pt reports per aasi with reports of being seen through the window on the floor of his home completely naked. Pt reports fall yesterday at some point. No obvious injuries, but pt does c/o R hip and leg pain. States he was weak, causing him to fall. On plavix. Also has pressure con on face from laying on floor. Hx DM, pacemaker. .     Patient is a 67-year-old male brought in by ambulance.  EMS reports that a neighbor stated the checked on patient and saw him laying on the floor naked.  Patient states that he fell at some point yesterday and he is been on the floor overnight since.  He complains of feeling very thirsty and generalized weakness.  He also complains of right hip pain.  Patient denies headache.  Patient denies chest pain or shortness of breath.  He denies any fever chills.  Does state he has abdominal pain and hurts to the scrotal area secondary to lesion that started several days ago.    Review of patient's allergies indicates:  No Known Allergies  Past Medical History:   Diagnosis Date    Diabetes mellitus     High cholesterol     Hypertension      Past Surgical History:   Procedure Laterality Date    A-V CARDIAC PACEMAKER INSERTION Left 11/9/2020    Procedure: INSERTION, CARDIAC PACEMAKER, DUAL CHAMBER;  Surgeon: Nolan Hernandez MD;  Location: [facility] CATH LAB;  Service: Cardiology;  Laterality: Left;    CORONARY ANGIOGRAPHY N/A 11/9/2020    Procedure: ANGIOGRAM, CORONARY ARTERY - right radial;  Surgeon: Nolan Hernandez MD;  Location: [facility] CATH LAB;  Service: Cardiology;  Laterality: N/A;     Family History   Family history unknown: Yes     Social History     Tobacco Use    Smoking status: Some Days     Packs/day: 1.50     Types: Cigarettes    Smokeless tobacco: Never   Substance Use Topics    Alcohol use: Never     Comment: occasionally    Drug use: Never     Review of Systems   Constitutional:  Positive  for activity change and fatigue.   HENT: Negative.     Eyes: Negative.    Respiratory: Negative.     Cardiovascular: Negative.    Gastrointestinal:  Positive for abdominal pain. Negative for diarrhea, nausea and vomiting.   Genitourinary:  Positive for genital sores and scrotal swelling.   Musculoskeletal:  Positive for arthralgias and myalgias.   Skin:  Positive for rash.   Neurological:  Positive for light-headedness. Negative for dizziness, facial asymmetry, speech difficulty, weakness and headaches.   Psychiatric/Behavioral: Negative.       Physical Exam     Initial Vitals [04/07/23 1156]   BP Pulse Resp Temp SpO2   (!) 148/70 82 19 98.2 °F (36.8 °C) 97 %      MAP       --         Physical Exam    Nursing note and vitals reviewed.  Constitutional: He appears well-developed and well-nourished.   67-year-old male appears weak in general.  Nurses a currently cleaning patient's secondary to feces on patient's buttocks and legs.   HENT:   Patient has bruising to the left forehead.  Patient has very dry mucous membranes.   Eyes: Pupils are equal, round, and reactive to light.   Neck: Neck supple.   Normal range of motion.  Cardiovascular:  Normal rate, regular rhythm and normal heart sounds.           Pacemaker to the left upper chest area.  Patient has a sternal scar.   Pulmonary/Chest: Breath sounds normal. No respiratory distress. He has no wheezes. He has no rhonchi.   Abdominal:   Patient has moderate generalized abdominal tenderness to palpation with guarding.   Musculoskeletal:      Cervical back: Normal range of motion and neck supple.      Comments: Patient has tenderness to palpation to the right hip area.  Patient can lift both lower extremities against gravity.  Patient has coolness and some mottling to distal bilateral lower extremities mainly to the feet and coolness to touch to both feet.  Sensation is intact to both feet.  Patient moves all digits of both feet.     Neurological: He is alert and  oriented to person, place, and time. He has normal strength.   Skin:   Patient has ulcerated lesion to the scrotum on the left and a couple lesions just proximal to the glans penis.   Psychiatric: He has a normal mood and affect. Thought content normal.       ED Course   Critical Care    Date/Time: 4/7/2023 10:28 PM  Performed by: Esdras Klein MD  Authorized by: Esdras Klein MD   Direct patient critical care time: 35 minutes  Total critical care time (exclusive of procedural time) : 35 minutes  Critical care time was exclusive of separately billable procedures and treating other patients.  Critical care was necessary to treat or prevent imminent or life-threatening deterioration of the following conditions: cardiac failure, circulatory failure, sepsis and metabolic crisis.  Critical care was time spent personally by me on the following activities: blood draw for specimens, development of treatment plan with patient or surrogate, discussions with consultants, evaluation of patient's response to treatment, examination of patient, obtaining history from patient or surrogate, ordering and performing treatments and interventions, ordering and review of laboratory studies, ordering and review of radiographic studies, pulse oximetry, re-evaluation of patient's condition and review of old charts.      Labs Reviewed   COMPREHENSIVE METABOLIC PANEL - Abnormal; Notable for the following components:       Result Value    Sodium Level 146 (*)     Chloride 115 (*)     Carbon Dioxide 21 (*)     Glucose Level 182 (*)     Blood Urea Nitrogen 48.7 (*)     Creatinine 1.32 (*)     Calcium Level Total 8.6 (*)     Protein Total 5.0 (*)     Albumin Level 2.2 (*)     Albumin/Globulin Ratio 0.8 (*)     Bilirubin Total 4.4 (*)     Alanine Aminotransferase 176 (*)     Aspartate Aminotransferase 100 (*)     All other components within normal limits   TROPONIN I - Abnormal; Notable for the following components:    Troponin-I  1.266 (*)     All other components within normal limits   APTT - Abnormal; Notable for the following components:    PTT 37.0 (*)     All other components within normal limits   PROTIME-INR - Abnormal; Notable for the following components:    PT 22.8 (*)     INR 2.06 (*)     All other components within normal limits   CK - Abnormal; Notable for the following components:    Creatine Kinase 608 (*)     All other components within normal limits   CBC WITH DIFFERENTIAL - Abnormal; Notable for the following components:    WBC 12.3 (*)     RBC 4.18 (*)     Hgb 12.2 (*)     Hct 37.9 (*)     MCHC 32.2 (*)     RDW 20.7 (*)     Platelet 57 (*)     Lymph # 0.58 (*)     Neut # 10.58 (*)     All other components within normal limits   TROPONIN I - Abnormal; Notable for the following components:    Troponin-I 1.061 (*)     All other components within normal limits   LIPASE - Normal   CBC W/ AUTO DIFFERENTIAL    Narrative:     The following orders were created for panel order CBC auto differential.  Procedure                               Abnormality         Status                     ---------                               -----------         ------                     CBC with Differential[[id number]]        Abnormal            Final result                 Please view results for these tests on the individual orders.   B-TYPE NATRIURETIC PEPTIDE   LACTATE DEHYDROGENASE   HAPTOGLOBIN   PATH REVIEW OF BLOOD SMEAR   FIBRINOGEN   URINALYSIS, REFLEX TO URINE CULTURE   HEPATITIS PANEL, ACUTE   PROTEIN / CREATININE RATIO, URINE   SODIUM, URINE, RANDOM   UREA NITROGEN, URINE, RANDOM   TROPONIN I   POCT GLUCOSE MONITORING CONTINUOUS     EKG Readings: (Independently Interpreted)   Rhythm: Paced Rhythm. Heart Rate: 79.   ECG Results              EKG 12-lead (Final result)  Result time 04/07/23 14:53:47      Final result by Interface, Lab In [facility] (04/07/23 14:53:47)                   Narrative:    Test Reason : I48.0,    Vent. Rate : 079  BPM     Atrial Rate : 077 BPM     P-R Int : 000 ms          QRS Dur : 174 ms      QT Int : 548 ms       P-R-T Axes : 000 -83 088 degrees     QTc Int : 628 ms    Ventricular-paced rhythm  Abnormal ECG  Confirmed by Nahid Sen MD ([id number]) on 4/7/2023 2:53:39 PM    Referred By: [email or web address]   SELF           Confirmed By:Nahid Sen MD                                  Imaging Results              CT Head Without Contrast (Final result)  Result time 04/07/23 14:00:32      Final result by Dong Grant MD (04/07/23 14:00:32)                   Narrative:    EXAMINATION  CT HEAD WITHOUT CONTRAST    CLINICAL HISTORY  Head trauma, moderate-severe;trauma;    TECHNIQUE  Axial non-contrast CT images of the head were acquired and multiplanar reconstructions accomplished by a CT technologist at a separate workstation, pushed to PACS for physician review.    COMPARISON  None available at the time of the initial interpretation.    FINDINGS  Images were reviewed in subdural, brain, soft tissue, and bone windows.    Exam quality: Motion/streak artifact limits assessment of the posterior fossa.    Hemorrhage: No evidence of acute hyperattenuating blood products.    Parenchyma: There is diffuse bilateral supratentorial white matter hypoattenuation, typical of chronic microvascular changes. No discrete mass, mass effect, or CT evidence of acute large vascular territory insult. Gray-white differentiation is preserved.    Midline shift: None.    CSF spaces: Proportional appearance of ventricular and sulcal enlargement. No hydrocephalus. No masses or fluid collections.    Vasculature: No focally hyperdense artery. Scattered carotid siphon calcifications are present. No abnormal densities within the dural sinuses.    Other findings: No abnormalities of the scalp or subjacent osseous structures. Mastoids are well aerated. No focal abnormality of the sella. The included facial structures are unremarkable.    IMPRESSION  1. No acute intracranial  abnormality.  2. Age-related atrophy and chronic sequela of white matter microvascular disease.  3. Additional chronic secondary details discussed above.    RADIATION DOSE  Automated tube current modulation, weight-based exposure dosing, and/or iterative reconstruction technique utilized to reach lowest reasonably achievable exposure rate.    DLP: 956 mGy*cm      Electronically signed by: Dong Grant  Date:    04/07/2023  Time:    14:00                                      CT Abdomen Pelvis With Contrast (Final result)  Result time 04/07/23 14:07:39      Final result by Lissette Lambert MD (04/07/23 14:07:39)                   Impression:      1. Anasarca.  Bilateral pleural effusions, free intraperitoneal fluid, mesenteric edema, retroperitoneal edema, presacral edema, body wall edema  2. Filling defect at the left ventricular apex highly suspicious for thrombus.  Correlate with echocardiogram.  3. Cardiomegaly  4. It is difficult to exclude bowel wall edema particularly at the colon which is decompressed.  5. Severe aortoiliac atherosclerosis  6.  This report was flagged in Epic as abnormal.      Electronically signed by: Lissette Lambert  Date:    04/07/2023  Time:    14:07               Narrative:    EXAMINATION:  CT ABDOMEN PELVIS WITH CONTRAST    CLINICAL HISTORY:  Abdominal pain, acute, nonlocalized;    TECHNIQUE:  Helically acquired images with axial, sagittal and coronal reformations were obtained from the lung bases to the pubic symphysis after the IV administration of contrast.    Automated tube current modulation, weight-based exposure dosing, and/or iterative reconstruction technique utilized to reach lowest reasonably achievable exposure rate.    DLP: 1651 mGy*cm    COMPARISON:  CT chest 11/04/2020    FINDINGS:  LIMITATIONS: Beam hardening artifact related to placement of patient's arms down by the sides.    HEART: Cardiomegaly.  Filling defect at the left ventricular apex measuring approximately  4.3 cm (2, 11).  Distal aspect of a pacer device is seen at the right atrium and right ventricular apex.    LUNG BASES: There are small bilateral pleural effusions.  Basilar atelectasis.    LIVER: Normal attenuation. No appreciable focal hepatic lesion.    BILIARY: Possible small dependently layering gallstone.    PANCREAS: No inflammatory change.    SPLEEN: Normal in size    ADRENALS: No mass.    KIDNEYS/URETERS: Beam hardening artifact obscures evaluation of the kidneys.  Left renal cyst requires no dedicated imaging follow-up.  No hydronephrosis seen.    GI TRACT/MESENTERY:  No evidence of bowel obstruction.  The colon is decompressed which limits evaluation for wall thickening.    PERITONEUM AND RETROPERITONEUM: There is small volume free intraperitoneal fluid.  There is mesenteric edema.  There is retroperitoneal edema.  Presacral edema.No free air.    LYMPH NODES: No enlarged lymph nodes by size criteria.    VASCULATURE: Severe aortoiliac atherosclerosis.    BLADDER: Normal appearance given degree of distention.    REPRODUCTIVE ORGANS: Normal as visualized.    SOFT TISSUES: Body wall edema.    BONES: No acute osseous abnormality.                                       X-Ray Hip 2 or 3 views Right (with Pelvis when performed) (Final result)  Result time 04/07/23 13:28:25      Final result by Lissette Lambert MD (04/07/23 13:28:25)                   Impression:      No acute osseous abnormality.      Electronically signed by: Lissette Lambert  Date:    04/07/2023  Time:    13:28               Narrative:    EXAMINATION:  XR HIP WITH PELVIS WHEN PERFORMED, 2 OR 3  VIEWS RIGHT    CLINICAL HISTORY:  Unspecified fall, initial encounter    TECHNIQUE:  AP view of the pelvis and AP and frog leg lateral view of the right hip were performed.    COMPARISON:  None.    FINDINGS:  BONE: No fracture. No dislocation.    SOFT TISSUES: Calcific atherosclerosis.                                       Medications   sodium  chloride 0.9% flush 10 mL (has no administration in time range)   melatonin tablet 6 mg (has no administration in time range)   ondansetron injection 4 mg (has no administration in time range)   prochlorperazine injection Soln 5 mg (has no administration in time range)   polyethylene glycol packet 17 g (has no administration in time range)   senna-docusate 8.6-50 mg per tablet 2 tablet (has no administration in time range)   acetaminophen tablet 650 mg (has no administration in time range)   aluminum-magnesium hydroxide-simethicone 200-200-20 mg/5 mL suspension 30 mL (has no administration in time range)   acetaminophen tablet 1,000 mg (has no administration in time range)   glucagon (human recombinant) injection 1 mg (has no administration in time range)   dextrose 10% bolus 125 mL 125 mL (has no administration in time range)   dextrose 10% bolus 250 mL 250 mL (has no administration in time range)   insulin aspart U-100 injection 0-5 Units (has no administration in time range)   hydrALAZINE injection 10 mg (has no administration in time range)   cloNIDine tablet 0.2 mg (has no administration in time range)   sodium chloride 0.9% bolus 1,000 mL 1,000 mL (0 mLs Intravenous Stopped 4/7/23 1327)   iopamidoL (ISOVUE-370) injection 100 mL (100 mLs Intravenous Given 4/7/23 1357)   aspirin tablet 325 mg (325 mg Oral Given 4/7/23 1434)   piperacillin-tazobactam (ZOSYN) 4.5 g in dextrose 5 % in water (D5W) 5 % 100 mL IVPB (MB+) (0 g Intravenous Stopped 4/7/23 1541)   perflutren lipid microspheres injection 2 mL (2 mLs Intravenous Given 4/7/23 1512)   vancomycin in dextrose 5 % 1 gram/250 mL IVPB 2,000 mg (0 mg Intravenous Stopped 4/7/23 1853)   furosemide injection 80 mg (80 mg Intravenous Given 4/7/23 2213)     Medical Decision Making:   Initial Assessment:   As per HPI  Differential Diagnosis:   Syncope, near syncope, arrhythmia, acute coronary syndrome, CVA, dehydration, electrolyte abnormality, pelvic fracture, hip  fracture  Clinical Tests:   Lab Tests: Ordered and Reviewed       <> Summary of Lab: Patient has elevated liver enzymes including total bilirubin.  Patient has elevated BUN creatinine.  Fluid boluses were given.  Patient also has an elevated troponin, Cardiology was consulted.  Patient's CPK is elevated at 600.  Radiological Study: Ordered and Reviewed  ED Management:  Patient with anasarca type changes on CT.  On CT of the abdomen pelvis with contrast there was a possible left ventricular thrombus and an echocardiogram was then ordered.  Patient is already on blood thinners, Eliquis.  His INR is above 2.  Patient also has some mottling of bilateral lower extremities and nonpalpable dorsalis pedis and anterior tibialis pulses bilaterally.  Arterial and venous ultrasounds of both lower extremities were ordered.             ED Course as of 04/07/23 2229 Fri Apr 07, 2023   1434 Vital signs remained stable.  Possible left ventricular thrombus seen on CT scan of the abdomen pelvis with contrast.  Echocardiogram was recommended and ordered.  Ultrasound arterial and venous was also ordered bilateral lower extremities secondary to mottling of both feet. [KG]   1435 Patient is already on Eliquis.  His INR currently is 2.06. [KG]   1459 Dr Klein will assume care of patient.  At this time vascular, arterial and venous ultrasounds pending of bilateral lower extremities are pending and echocardiogram is pending. [KG]   1500 Hospitalist reports to wait until ultrasounds are complete for admission. [KG]   1600 EKG 12-lead  EKG independently interpreted by me.  EKG: Paced rhythm @ 79, no STEMI, QTc 628 [MC]   1615 US reviewed and discussed with cardiology. Hospitalist consulted for admission and accepts the patient. [MC]      ED Course User Index  [KG] Eugenio Concepcion MD  [MC] Esdras Klein MD                 Clinical Impression:   Final diagnoses:  [W19.XXXA] Fall  [I51.3] Left ventricular thrombus  [R23.1]  Pallor  [R23.1] Pallor of extremity  [R53.1] Generalized weakness  [R60.1] Anasarca  [J90] Bilateral pleural effusion  [R74.8] Elevated liver enzymes  [A41.9] Sepsis        ED Disposition Condition    Admit Stable                Esdras Klein MD  04/07/23 [id number]

## 2023-04-07 NOTE — Clinical Note
Diagnosis: Sepsis [216640]   Admitting Provider:: SHILA HAYWARD [[id number]]   Future Attending Provider: SHILA HAYWARD [[id number]]   Reason for IP Medical Treatment  (Clinical interventions that can only be accomplished in the IP setting? ) :: abx, simran clark   I certify that Inpatient services for greater than or equal to 2 midnights are medically necessary:: Yes   Plans for Post-Acute care--if anticipated (pick the single best option):: A. No post acute care anticipated at this time

## 2023-04-08 NOTE — PROGRESS NOTES
Inpatient Nutrition Assessment    Admit Date: 4/7/2023   Total duration of encounter: 1 day     Nutrition Recommendation/Prescription     If unable to begin oral intake, recommend central line access and begin IV nutrition vs tube feeds:  Tube feed recommendations: Diabetisource AC at goal rate of 85mL/hr.   IV nutrition recommendations with central access: Clinimix 5/15 @ 65mL/hr x24hrs, Then advance to goal rate 100mL/hr.   Recommend SLP eval prior to beginning oral diet      Communication of Recommendations: reviewed with nurse    Nutrition Assessment     Malnutrition Assessment/Nutrition-Focused Physical Exam    Malnutrition in the context of acute illness or injury  Degree of Malnutrition: non-severe (moderate) malnutrition  Energy Intake: unable to obtain  Interpretation of Weight Loss: unable to obtain  Body Fat:mild depletion  Area of Body Fat Loss: orbital region  and upper arm region - triceps / biceps  Muscle Mass Loss: mild depletion  Area of Muscle Mass Loss: temple region - temporalis muscle, clavicle bone region - pectoralis major, deltoid, trapezius muscles, and clavicle and acromion bone region - deltoid muscle  Fluid Accumulation: does not meet criteria  Edema: does not meet criteria   Reduced  Strength: unable to obtain  A minimum of two characteristics is recommended for diagnosis of either severe or non-severe malnutrition.    Chart Review    Reason Seen: malnutrition screening tool (MST)    Malnutrition Screening Tool Results   Have you recently lost weight without trying?: Unsure  Have you been eating poorly because of a decreased appetite?: No   MST Score: 2     Diagnosis:  Unwitnessed fall/syncope  Acute on chronic systolic and diastolic heart failure  LV apical thrombus (while on Eliquis)  NSTEMI unknown type  Congestive hepatopathy versus acute cholecystitis  Acute kidney injury  Acute thrombocytopenia -etiology possibly ?cardiac/LYNCH cirrhosis or  "infection/sepsis  Hypernatremia    Relevant Medical History: Severe BLE PAD, HTN, HLD, CHB s/p PPM, paroxysmal AFib on Eliquis, T2DM    Nutrition-Related Medications: furosemide, zosyn  Calorie Containing IV Medications: no significant kcals from medications at this time    Nutrition-Related Labs:  : Na-149, Chloride-114, bun-48.9, Gluc-150, AST-80, ALT-167, HgbA1c-10.7    Diet/PN Order: Diet NPO  Oral Supplement Order: none  Tube Feeding Order: none  Appetite/Oral Intake: NPO/not applicable  Factors Affecting Nutritional Intake: impaired cognitive status/motor control and NPO  Food/Oriental orthodox/Cultural Preferences: none reported  Food Allergies: no known food allergies    Skin Integrity: skin tear  Wound(s):   scrotal ulcer noted    Comments    : pt with confusion, was trying to use the urinal with cap on, tubing shoved inside the urinal. Notified nsg. Pt NPO with AMS. No IV access noted. Elevated HgbA1c noted.     Anthropometrics    Height: 6' 0.01" (182.9 cm) Height Method: Stated  Last Weight: 93 kg (205 lb) (23 0452) Weight Method: Bed Scale  BMI (Calculated): 27.8  BMI Classification: overweight (BMI 25-29.9)        Ideal Body Weight (IBW), Male: 178.06 lb     % Ideal Body Weight, Male (lb): 115.17 %                          Usual Weight Provided By: EMR weight history    Wt Readings from Last 5 Encounters:   23 93 kg (205 lb)   23 102.8 kg (226 lb 10.1 oz)   22 92.7 kg (204 lb 5.9 oz)   21 95.1 kg (209 lb 10.5 oz)   20 93 kg (205 lb)     Weight Change(s) Since Admission:  Admit Weight: 93 kg (205 lb) (23 1156)  93kg-unsure of UBW; will monitor wt trends    Estimated Needs    Weight Used For Calorie Calculations: 93 kg (205 lb 0.4 oz)  Energy Calorie Requirements (kcal): 2080 kcal (MSJxSF1.2)     Weight Used For Protein Calculations: 93 kg (205 lb 0.4 oz)  Protein Requirements: 120gm (kgx1.3)  Fluid Requirements (mL): 2080mL (1mL/kcal)  Temp (24hrs), Av.4 °F " (35.8 °C), Min:96.1 °F (35.6 °C), Max:96.9 °F (36.1 °C)         Enteral Nutrition    Patient not receiving enteral nutrition at this time.    Parenteral Nutrition    Patient not receiving parenteral nutrition support at this time.    Evaluation of Received Nutrient Intake    Calories: not meeting estimated needs  Protein: not meeting estimated needs    Patient Education    Not applicable.    Nutrition Diagnosis     PES: Malnutrition related to lack of self-feeding ability as evidenced by AMS. (new)    Interventions/Goals     Intervention(s): modified composition of enteral nutrition, modified composition of parenteral nutrition, and collaboration with other providers  Goal: Meet greater than 75% of nutritional needs by follow-up. (new)    Monitoring & Evaluation     Dietitian will monitor energy intake.  Nutrition Risk/Follow-Up: high (follow-up in 1-4 days)   Please consult if re-assessment needed sooner.

## 2023-04-08 NOTE — PROCEDURES
"Darren Patel is a 67 y.o. male patient.    Temp: 96.1 °F (35.6 °C) (04/08/23 1150)  Pulse: 80 (04/08/23 1150)  Resp: (!) 22 (04/08/23 1150)  BP: 123/74 (04/08/23 1150)  SpO2: 97 % (04/08/23 1150)  Weight: 93 kg (205 lb) (04/08/23 0452)  Height: 6' 0.01" (182.9 cm) (04/08/23 1221)    PICC  Date/Time: 4/8/2023 12:58 PM  Performed by: Collette Chaudhry RN  Consent Done: Yes  Time out: Immediately prior to procedure a time out was called to verify the correct patient, procedure, equipment, support staff and site/side marked as required  Indications: med administration and vascular access  Anesthesia: local infiltration  Local anesthetic: lidocaine 1% without epinephrine  Anesthetic Total (mL): 3  Preparation: skin prepped with ChloraPrep  Skin prep agent dried: skin prep agent completely dried prior to procedure  Sterile barriers: all five maximum sterile barriers used - cap, mask, sterile gown, sterile gloves, and large sterile sheet  Hand hygiene: hand hygiene performed prior to central venous catheter insertion  Location details: right basilic  Catheter type: double lumen  Catheter size: 5 Fr  Catheter Length: 39cm    Ultrasound guidance: yes  Vessel Caliber: medium and patent, compressibility normal  Needle advanced into vessel with real time Ultrasound guidance.  Guidewire confirmed in vessel.  Sterile sheath used.  Number of attempts: 1  Post-procedure: blood return through all ports, sterile dressing applied and chlorhexidine patch    Assessment: placement verified by x-ray, tip termination and successful placement  Complications: none  Comments: Picc ordered long term abx therapy with zosyn.  Pt has pacer to left chest wall, picc placed right arm without difficulty.         Collette chaudhry rn  4/8/2023    "

## 2023-04-08 NOTE — CONSULTS
Acute Care Surgery   History and Physical    Patient Name: Darren Patel  YOB: 1955  Date: 04/08/2023 5:12 PM  Date of Admission: 4/7/2023  HD#1  POD#* No surgery found *    PRESENTING HISTORY   Chief Complaint/Reason for Admission: Acute on chronic HFrEF (heart failure with reduced ejection fraction)    History of Present Illness:  Patient is a 67 y.o. male with PMH of ischemic cardiomyopathy/HRrEF 15% with thrombus in L ventrical, CAD s/p CABG, paroxysmal a-fib on eliquis, 3rd degree heart block s/p pace maker, PAD s/p multiple interventions on plavix, T2DM, HTN, and HLD who is currently admitted to medicine after being found down. Surgery consulted for evaluation of acute cholecystitis based on findings on RUQ ultrasound. The patient is currently somewhat altered and it is unclear what his baseline is. He answers questions appropriately for the most part and states that his abdomen does not hurt unless someone touches it. He states that he has been nauseous but does not think that he has vomited. History otherwise difficult to obtain secondary to mental status.     Review of Systems:  12 point ROS negative except as stated in HPI    PAST HISTORY:   Past medical history:  Past Medical History:   Diagnosis Date    Diabetes mellitus     High cholesterol     Hypertension        Past surgical history:  Past Surgical History:   Procedure Laterality Date    A-V CARDIAC PACEMAKER INSERTION Left 11/9/2020    Procedure: INSERTION, CARDIAC PACEMAKER, DUAL CHAMBER;  Surgeon: Nolan Hernandez MD;  Location: [facility] CATH LAB;  Service: Cardiology;  Laterality: Left;    CORONARY ANGIOGRAPHY N/A 11/9/2020    Procedure: ANGIOGRAM, CORONARY ARTERY - right radial;  Surgeon: Nolan Hernandez MD;  Location: [facility] CATH LAB;  Service: Cardiology;  Laterality: N/A;       Family history:  Family History   Family history unknown: Yes       Social history:  Social History     Socioeconomic History    Marital status:     Tobacco Use    Smoking status: Some Days     Packs/day: 1.50     Types: Cigarettes    Smokeless tobacco: Never   Substance and Sexual Activity    Alcohol use: Never     Comment: occasionally    Drug use: Never     Social History     Tobacco Use   Smoking Status Some Days    Packs/day: 1.50    Types: Cigarettes   Smokeless Tobacco Never      Social History     Substance and Sexual Activity   Alcohol Use Never    Comment: occasionally        MEDICATIONS & ALLERGIES:     No current facility-administered medications on file prior to encounter.     Current Outpatient Medications on File Prior to Encounter   Medication Sig    apixaban (ELIQUIS) 5 mg Tab Take 5 mg by mouth.    aspirin (ECOTRIN) 81 MG EC tablet Take 1 tablet (81 mg total) by mouth once daily.    atorvastatin (LIPITOR) 80 MG tablet Take 80 mg by mouth.    carvediloL (COREG) 12.5 MG tablet Take 12.5 mg by mouth 2 (two) times daily.    clopidogreL (PLAVIX) 75 mg tablet Take 75 mg by mouth.    famotidine (PEPCID) 20 MG tablet Take 20 mg by mouth.    losartan (COZAAR) 50 MG tablet Take 1 tablet by mouth once daily.    metFORMIN (GLUCOPHAGE) 1000 MG tablet Take 1,000 mg by mouth.    mupirocin (BACTROBAN) 2 % ointment Apply to nostrils twice daily    nitroGLYCERIN (NITROSTAT) 0.4 MG SL tablet DISSOLVE 1 TABLET UNDER THE TONGUE EVERY 5 MINUTES AS NEEDED FOR CHEST PAIN FOR UP TO 3 DOSES. IF NO RELIEF, SEEK MEDICAL ATTENTION    sacubitriL-valsartan (ENTRESTO) 24-26 mg per tablet Take by mouth.       Allergies: Review of patient's allergies indicates:  No Known Allergies    Scheduled Meds:   carvediloL  12.5 mg Oral BID    furosemide (LASIX) injection  80 mg Intravenous BID    nitroGLYCERIN 2% TD oint  1 inch Topical (Top) Q6H    piperacillin-tazobactam (ZOSYN) IVPB  4.5 g Intravenous Q8H    sacubitriL-valsartan  1 tablet Oral BID    sodium chloride 0.9%  10 mL Intravenous Q6H       Continuous Infusions:    PRN Meds:acetaminophen, acetaminophen, aluminum-magnesium  "hydroxide-simethicone, cloNIDine, dextrose 10%, dextrose 10%, glucagon (human recombinant), hydrALAZINE, insulin aspart U-100, melatonin, ondansetron, polyethylene glycol, prochlorperazine, senna-docusate 8.6-50 mg, sodium chloride 0.9%, Flushing PICC Protocol **AND** sodium chloride 0.9% **AND** sodium chloride 0.9%    OBJECTIVE:   Vital Signs:  VITAL SIGNS: 24 HR MIN & MAX LAST   Temp  Min: 96.1 °F (35.6 °C)  Max: 96.9 °F (36.1 °C)  96.1 °F (35.6 °C)   BP  Min: 117/70  Max: 153/128  117/70    Pulse  Min: 80  Max: 82  80    Resp  Min: 19  Max: 23  (!) 22    SpO2  Min: 95 %  Max: 100 %  97 %      HT: 6' 0.01" (182.9 cm)  WT: 93 kg (205 lb)  BMI: 27.8     Intake/output:  Intake/Output - Last 3 Shifts         04/06 0700  04/07 0659 04/07 0700  04/08 0659 04/08 0700  04/09 0659    Urine (mL/kg/hr)   3050 (3.2)    Total Output   3050    Net   -3050                   Intake/Output Summary (Last 24 hours) at 4/8/2023 1712  Last data filed at 4/8/2023 1546  Gross per 24 hour   Intake --   Output 3050 ml   Net -3050 ml         Physical Exam:  General: Well developed, well nourished, no acute distress  HEENT: Normocephalic, atraumatic, PERRL  CV: RR  Resp: NWOB on nasal cannula ~3L  GI:  Abdomen soft, non-distended, diffusely tender to palpitation worse on the left than the right  :  Deferred  MSK: No muscle atrophy, cyanosis, peripheral edema, moving all extremities spontaneously in mitten restraints   Skin/wounds:  No rashes, ulcers, erythema  Neuro:  CNII-XII grossly intact, alert and oriented to person and place but not time and only somewhat situation    Labs:  Troponin:  Recent Labs     04/07/23  1305 04/07/23  1858 04/07/23  2221   TROPONINI 1.266* 1.061* 1.289*     CBC:  Recent Labs     04/07/23  1305 04/08/23  0431   WBC 12.3* 8.7   RBC 4.18* 4.56*   HGB 12.2* 13.1*   HCT 37.9* 42.2   PLT 57* 52*   MCV 90.7 92.5   MCH 29.2 28.7   MCHC 32.2* 31.0*     CMP:  Recent Labs     04/07/23  1305 04/08/23  0431   CALCIUM " 8.6* 8.9   ALBUMIN 2.2* 2.3*   * 149*   K 4.3 4.1   CO2 21* 22*   BUN 48.7* 48.9*   CREATININE 1.32* 1.06   ALKPHOS 92 96   * 167*   * 80*   BILITOT 4.4* 5.6*     Lactic Acid:  No results for input(s): LACTATE in the last 72 hours.  ETOH:  No results for input(s): ETHANOL in the last 72 hours.   Urine Drug Screen:  Recent Labs     04/08/23  1411   COCAINE Negative   OPIATE Negative   FENTANYL Negative   MDMA Negative      ABG:  No results for input(s): PH, PCO2, PO2, HCO3, BE, POCSATURATED in the last 72 hours.    Diagnostic Results:  CTA Runoff ABD PEL Bilat Lower Ext   Final Result      1. Extensive atherosclerotic changes of the abdominal aorta and its branches with bilateral peripheral vascular disease.   2. Minimal scattered bilateral three-vessel runoff.         Electronically signed by: Franchesca Valdez   Date:    04/08/2023   Time:    15:49      X-Ray Chest 1 View   Final Result      Right-sided PICC line with tip over the superior vena cava.         Electronically signed by: Franchesca Valdez   Date:    04/08/2023   Time:    13:28      X-Ray Chest 1 View   Final Result      Small left pleural effusion.         Electronically signed by: Franchesca Valdez   Date:    04/08/2023   Time:    09:13      US Abdomen Limited   Final Result   Impression:      1. The gallbladder appears full but non-distended. The gallbladder wall measures 3.5 mm in thickness. The sonographic Hulett sign is positive. There are two tiny echogenic foci along the gallbladder wall, which measure 9 x 5 x 5 mm and 7 x 3 x 6 mm, which may reflect polyps. Correlate with clinical and laboratory findings as regards additional evaluation and follow-up as the above findings could reflect cholecystitis although the presence of significant ascites decreases sensitivity and specificity.      2. There is mild right hydronephrosis.      3. There is small right pleural effusion. There is mild ascites.      4. Mild hepatomegaly with  diffuse fatty infiltration.      5. Details and other findings as noted above.      No significant discrepancy with overnight report.         Electronically signed by: Noel Yoon   Date:    04/08/2023   Time:    09:35      CT Head Without Contrast   Final Result      CT Abdomen Pelvis With Contrast   Final Result   Abnormal      1. Anasarca.  Bilateral pleural effusions, free intraperitoneal fluid, mesenteric edema, retroperitoneal edema, presacral edema, body wall edema   2. Filling defect at the left ventricular apex highly suspicious for thrombus.  Correlate with echocardiogram.   3. Cardiomegaly   4. It is difficult to exclude bowel wall edema particularly at the colon which is decompressed.   5. Severe aortoiliac atherosclerosis   6.  This report was flagged in Epic as abnormal.         Electronically signed by: Lissette Lambert   Date:    04/07/2023   Time:    14:07      X-Ray Hip 2 or 3 views Right (with Pelvis when performed)   Final Result      No acute osseous abnormality.         Electronically signed by: Lissette Lambert   Date:    04/07/2023   Time:    13:28          ASSESSMENT & PLAN:    Patient is a 67 y.o. male with PMH of ischemic cardiomyopathy/HRrEF 15% with thrombus in L ventrical, CAD s/p CABG, paroxysmal a-fib on eliquis, 3rd degree heart block s/p pace maker, PAD s/p multiple interventions on plavix, T2DM, HTN, and HLD. He currently has diffuse abdominal pain to palpitation and it is not possible to assess a Juan sign due to his mental status. He has been afebrile. His WBC is 8/7 today from 12.3 yesterday. His labs are notable for T bili 5.6, direct bili 3.4, AST 80, , Na 149, and BUN of 48.9. His abdominal CT showed anasarca with bilateral pleural effusion and fee intraperitoneal fluid but liver and biliary system were unremarkable other than possible small gall stones. His RUQ US showed a gall bladder that non-distended but full with possible cysts and the inability to rule out  cholecystitis     - His clinical picture is not consistent with acute cholecystitis as his WBC is normal he has been afebrile and he is diffusely tender across his abdomen and worse on the left than right, additionally given the patient's significant past medical history and severe heart failure with EF of ~15% and thrombus the patient is a very poor surgical candidate and likely would not tolerate cholecystectomy and wound need a cholecystostomy tube placed   - Given elevated bilirubin and mildly elevated transaminases recommend evaluation by GI for work up of possible hepatis stasis and consideration of cholecystostomy tube placement   - Please call with any further questions or concerns     CHEKO Paul MD  General Surgery - PGY1  4/8/2023

## 2023-04-08 NOTE — H&P
[facility] Hospital Medicine - H&P Note    Patient Name: Darren Patel  : 1955  MRN: [id number]  PCP: ZEYNEP Rosales  Admitting Physician: Nate Robledo MD  Admission Class: IP- Inpatient   Length of Stay: 1  Face-to-Face encounter date: 2023  Code status: Full    Chief Complaint   Unwitnessed fall    History of Present Illness   This is a 67-year-old male with medical history of ischemic cardiomyopathy/HFrEF 35% per echo , CAD s/p CABG, paroxysmal AFib on Eliquis, third-degree AV block  s/p dual-chamber pacemaker, PAD/multiple interventions on Plavix, T2DM, HTN, HLD present to the ED after being found by his neighbor laying on the floor naked.  Patient has no recollection of what happened but report that he fell a day prior and currently complaining of right hip and right-sided abdominal pain.  Report over the past week he was feeling extremely weak but denies focal extremity weakness or numbness.  Denies chest pain but report dyspnea on exertion.  Denies cough, fever or chills.    On arrival to ED he was afebrile, hypertensive and saturating 97% on 3 L OxyMask.  EKG showed ventricular paced rhythm.  Labs notable for WBC 12.3, hemoglobin 12.2, platelets 57, INR 2.06, sodium 146, potassium 4.3, chloride 115, CO2 21, creatinine 1.32, BUN 48, albumin 2.2, normal alkaline phosphatase, total bilirubin 4.4, , , BNP 3249, , troponin 1.266 > 1.061, , urinalysis unremarkable.    CT head showed no acute intracranial abnormalities.      CT abdomen and pelvis show evidence of anasarca with bilateral small pleural effusion, small ascites and mesenteric edema, presacral and body wall edema, cardiomegaly and filling defect in the LV apex highly suspicious for thrombus, severe aortoiliac atherosclerosis.    Pelvic x-ray showed evidence fractures.    Echo final read pending but noted for LVEF 15%, grade 1 diastolic dysfunction, large fixed LV apical thrombus  present, mobile echodensity noted on RV pacer lead.    BLE venous ultrasound showed no evidence of DVT.  BLE arterial ultrasound show bilateral severe arterial flow reduction.    He was given 1 L of normal saline, vancomycin, Zosyn, aspirin 325mg, cardiology consulted and referred to hospital medicine service for further evaluation and management.    ROS   Except as documented, all other systems reviewed and negative     Past Medical History   Ischemic cardiomyopathy/HFrEF  CAD s/p CABG - 05/2021  Paroxysmal AFib on Eliquis  Third-degree AV block s/p  dual-chamber pacemaker -11/2020  PAD- BLE multiple interventions on Plavix  HTN  HLD  T2DM    Past Surgical History     Past Surgical History:   Procedure Laterality Date    A-V CARDIAC PACEMAKER INSERTION Left 11/9/2020    Procedure: INSERTION, CARDIAC PACEMAKER, DUAL CHAMBER;  Surgeon: Nolan Hernandez MD;  Location: [facility] CATH LAB;  Service: Cardiology;  Laterality: Left;    CORONARY ANGIOGRAPHY N/A 11/9/2020    Procedure: ANGIOGRAM, CORONARY ARTERY - right radial;  Surgeon: Nolan Hernandez MD;  Location: [facility] CATH LAB;  Service: Cardiology;  Laterality: N/A;       Social History     Social History     Tobacco Use    Smoking status: Some Days     Packs/day: 1.50     Types: Cigarettes    Smokeless tobacco: Never   Substance Use Topics    Alcohol use: Never     Comment: occasionally        Family History   Reviewed and negative    Allergies   Patient has no known allergies.    Home Medications     Prior to Admission medications    Medication Sig Start Date End Date Taking? Authorizing Provider   apixaban (ELIQUIS) 5 mg Tab Take 5 mg by mouth. 8/19/21   Historical Provider   aspirin (ECOTRIN) 81 MG EC tablet Take 1 tablet (81 mg total) by mouth once daily. 11/11/20 11/11/21  Elvin Marte MD   atorvastatin (LIPITOR) 80 MG tablet Take 80 mg by mouth. 8/19/21   Historical Provider   carvediloL (COREG) 12.5 MG tablet Take 12.5 mg by mouth 2 (two) times daily. 2/25/22    Historical Provider   clopidogreL (PLAVIX) 75 mg tablet Take 75 mg by mouth. 12/17/21   Historical Provider   famotidine (PEPCID) 20 MG tablet Take 20 mg by mouth. 11/15/21   Historical Provider   losartan (COZAAR) 50 MG tablet Take 1 tablet by mouth once daily. 5/19/21 5/19/22  Historical Provider   metFORMIN (GLUCOPHAGE) 1000 MG tablet Take 1,000 mg by mouth. 11/15/21   Historical Provider   mupirocin (BACTROBAN) 2 % ointment Apply to nostrils twice daily 11/10/20   Elvin Marte MD   nitroGLYCERIN (NITROSTAT) 0.4 MG SL tablet DISSOLVE 1 TABLET UNDER THE TONGUE EVERY 5 MINUTES AS NEEDED FOR CHEST PAIN FOR UP TO 3 DOSES. IF NO RELIEF, SEEK MEDICAL ATTENTION 4/18/22   Historical Provider   sacubitriL-valsartan (ENTRESTO) 24-26 mg per tablet Take by mouth. 12/30/21   Historical Provider        Physical Exam   Vital Signs  Temp:  [96.9 °F (36.1 °C)]   Pulse:  [80-81]   Resp:  [19-23]   BP: (126-153)/()   SpO2:  [95 %-100 %]    General: Appears comfortable  HEENT:  Bruise over the left forehead and neck  Neck:  JVD up to ear lobe  Chest:  Bilateral rales at the bases  CVS: Regular rhythm. Normal S1/S2. +2 pedal edema  Abdomen: nondistended, normoactive BS, epigastric and right upper quadrant tenderness and voluntary guarding  MSK: No obvious deformity or joint swelling, bilateral feet feels cold unable to palpate pulses on the left  Skin: Warm and dry  Neuro: AAOx3, no focal neurological deficit  Psych: Cooperative    Labs     Recent Labs     04/07/23  1305   WBC 12.3*   RBC 4.18*   HGB 12.2*   HCT 37.9*   MCV 90.7   MCH 29.2   MCHC 32.2*   RDW 20.7*   PLT 57*     Recent Labs     04/07/23  1305   PROTIME 22.8*   INR 2.06*   PTT 37.0*   LDH  --       Recent Labs     04/07/23  1305 04/07/23  2221   *  --    K 4.3  --    CHLORIDE 115*  --    CO2 21*  --    BUN 48.7*  --    CREATININE 1.32*  --    EGFRNORACEVR 58  --    GLUCOSE 182*  --    CALCIUM 8.6*  --    MG  --   --    PHOS  --   --    ALBUMIN 2.2*  --     GLOBULIN 2.8  --    ALKPHOS 92  --    *  --    *  --    BILITOT 4.4*  --    LIPASE 27  --    HGBA1C  --   --    BNP  --  3,249.1*       Recent Labs     04/07/23  1305 04/07/23  1858 04/07/23  2221   *  --   --    TROPONINI 1.266* 1.061* 1.289*          Imaging     US Abdomen Limited         CT Head Without Contrast   Final Result      CT Abdomen Pelvis With Contrast   Final Result   Abnormal      1. Anasarca.  Bilateral pleural effusions, free intraperitoneal fluid, mesenteric edema, retroperitoneal edema, presacral edema, body wall edema   2. Filling defect at the left ventricular apex highly suspicious for thrombus.  Correlate with echocardiogram.   3. Cardiomegaly   4. It is difficult to exclude bowel wall edema particularly at the colon which is decompressed.   5. Severe aortoiliac atherosclerosis   6.  This report was flagged in Epic as abnormal.         Electronically signed by: Lissette Lambert   Date:    04/07/2023   Time:    14:07      X-Ray Hip 2 or 3 views Right (with Pelvis when performed)   Final Result      No acute osseous abnormality.         Electronically signed by: Lissette Lambert   Date:    04/07/2023   Time:    13:28        Assessment & Plan   Unwitnessed fall/syncope  Acute on chronic systolic and diastolic heart failure  LV apical thrombus (while on Eliquis)  NSTEMI unknown type  Congestive hepatopathy versus acute cholecystitis  Acute kidney injury  Acute thrombocytopenia -etiology possibly ?cardiac/LYNCH cirrhosis or infection/sepsis  Hypernatremia    HX Severe BLE PAD, HTN, HLD, CHB s/p PPM, paroxysmal AFib on Eliquis, T2DM    Plan:    Telemetry monitoring  Continue IV Zosyn to cover possible cholecystitis  Abdominal ultrasound - pending  Lasix 80 mg IV Q12H, accurate I/O, daily weight, Keep K>4, Mg>2  Follow final/official echo report  Continue carvedilol and Entresto  Hold statin at this time due to elevated transaminases  Given thrombocytopenia and bleeding risk  will hold anticoagulation and will monitor platelets trend if remains stable/above 60 will restart Plavix and can start heparin drip and monitor platelets/bleeding.  Given he already developed LV thrombus while on Eliquis could consider switching to Xarelto or warfarin - will defer to Cardiology.  Check peripheral smear, fibrinogen, LDH, haptoglobin, B12, hepatitis panel  SSI Q6H, check A1c  Cardiology consulted    Critical care time:  45 minutes  Critical care diagnosis:  Acute on chronic HFrEF    Nate Robledo MD  Internal Medicine

## 2023-04-08 NOTE — NURSING
Pt arrive to floor with LLE cold/pale. Unable to palpate/doppler DP or posterior tibial pulses. Palpable +1 popliteal pulse. Pt not currently on any anticoagulation, [facility] notified.

## 2023-04-08 NOTE — CONSULTS
Inpatient consult to Cardiology  Consult performed by: ZEYNEP Cast  Consult ordered by: Eugenio Concepcion MD    [facility] - [facility] Medical Telemetry  Cardiology  Consult Note    Patient Name: Darren Patel  MRN: [id number]  Admission Date: 4/7/2023  Hospital Length of Stay: 1 days  Code Status: Full Code   Attending Provider: Nate Robledo MD   Consulting Provider: ZEYNEP Cast  Primary Care Physician: ZEYNEP Rosales  Principal Problem:Acute on chronic HFrEF (heart failure with reduced ejection fraction)    Patient information was obtained from past medical records and ER records.     Subjective:     Chief Complaint:  AMS    HPI:   Mr. Patel is  a 66y/o male, known to [facility] cardiology, with PMHx significant for ICMO, PAfib, HLD, CHB, HTN, CAD, T2DM, and PAD who presented to ED via EMS after being found naked on the floor. Patient reporting falling the day prior and unable to get up. He has c/o generalized weakness, right hip and right sided abdominal pain. Denies CP or SOB. CT head negative. CT abdomen/pelvis revealing anasarca with small bilateral pleural effusions, small ascites, cardiomegaly, filling defect in LV apex highly suspicious for thrombus and severe aortoiliac atherosclerosis. Echo revealing EF 15%, grade I LVDD, large fixed LV apical thrombus and mobile echodensity on RV pacer lead. BLE Venous US negative DVT. BLE arterial Us consistent with bilateral severe arterial flow reduction. Labs significant for SBC 12.3, H/H 12.2/37.9, Plt 57, INR 2.06, Na 146, Bun/creatinine 48.7/1.32, AST//176, BNP 3249, , Troponin 1.266-1.061-1.289. He was treated with 1L NS, IV ABX and admitted to hospitalist. [facility] has been consulted for NSTEMI.   **call by nurse this am regarding pale cool LLE with absent infrapopliteal pulses    PMH: ICMO, PAfib, HLD, CHB, HTN, CAD, T2DM,   PSH: LHC, Peripheral angiogram. 3V CABG '21  Family History:   Social History: current smoker;  hx cocaine use    Previous Cardiac Diagnostics:   TTE 04.07.23:  The left ventricle is mildly enlarged with eccentric hypertrophy and severely decreased systolic function. The estimated ejection fraction is 15%. Grade I left ventricular diastolic dysfunction. Severe left atrial enlargement. Mild mitral regurgitation. Elevated central venous pressure (15 mmHg). The estimated PA systolic pressure is 39 mmHg. Mobile echodensity noted on pacer lead. A large left ventricular thrombus is present. The thrombus is fixed and located in the apex. Mild tricuspid regurgitation.    Venous US BLE 04.07.23:  There was no evidence of deep or superficial vein thrombosis in the bilateral lower extremities.    Arterial US BLE 04.07.23:  The right lower extremity demonstrated severe arterial flow reduction consistent with inflow disease and monophasic waveforms throughout.    Trickle flow was seen in the posterior tibial artery of the right lower extremity.    The left lower extremity demonstrated severe arterial flow reduction primarily in the popliteal and tibial arteries consistent with monophasic waveforms.      Right Peripheral angiogram 5/20/22:  Occlusion of distal popliteal artery, anterior tibial artery and severe stenosis in Posterior tibial and peroneal artery    Right peripheral angiogram 05/05/2022:  Stenting of right SFA, right profundus, and right external iliac artery    Past Medical History:   Diagnosis Date    Diabetes mellitus     High cholesterol     Hypertension        Past Surgical History:   Procedure Laterality Date    A-V CARDIAC PACEMAKER INSERTION Left 11/9/2020    Procedure: INSERTION, CARDIAC PACEMAKER, DUAL CHAMBER;  Surgeon: Nolan Hernandez MD;  Location: [facility] CATH LAB;  Service: Cardiology;  Laterality: Left;    CORONARY ANGIOGRAPHY N/A 11/9/2020    Procedure: ANGIOGRAM, CORONARY ARTERY - right radial;  Surgeon: Nolan Hernandez MD;  Location: [facility] CATH LAB;  Service: Cardiology;  Laterality: N/A;        Review of patient's allergies indicates:  No Known Allergies    No current facility-administered medications on file prior to encounter.     Current Outpatient Medications on File Prior to Encounter   Medication Sig    apixaban (ELIQUIS) 5 mg Tab Take 5 mg by mouth.    aspirin (ECOTRIN) 81 MG EC tablet Take 1 tablet (81 mg total) by mouth once daily.    atorvastatin (LIPITOR) 80 MG tablet Take 80 mg by mouth.    carvediloL (COREG) 12.5 MG tablet Take 12.5 mg by mouth 2 (two) times daily.    clopidogreL (PLAVIX) 75 mg tablet Take 75 mg by mouth.    famotidine (PEPCID) 20 MG tablet Take 20 mg by mouth.    losartan (COZAAR) 50 MG tablet Take 1 tablet by mouth once daily.    metFORMIN (GLUCOPHAGE) 1000 MG tablet Take 1,000 mg by mouth.    mupirocin (BACTROBAN) 2 % ointment Apply to nostrils twice daily    nitroGLYCERIN (NITROSTAT) 0.4 MG SL tablet DISSOLVE 1 TABLET UNDER THE TONGUE EVERY 5 MINUTES AS NEEDED FOR CHEST PAIN FOR UP TO 3 DOSES. IF NO RELIEF, SEEK MEDICAL ATTENTION    sacubitriL-valsartan (ENTRESTO) 24-26 mg per tablet Take by mouth.     Family History       Family history is unknown by patient.          Tobacco Use    Smoking status: Some Days     Packs/day: 1.50     Types: Cigarettes    Smokeless tobacco: Never   Substance and Sexual Activity    Alcohol use: Never     Comment: occasionally    Drug use: Never    Sexual activity: Not on file       Review of Systems   Unable to perform ROS: Mental status change     Objective:     Vital Signs (Most Recent):  Temp: 96.9 °F (36.1 °C) (04/08/23 0452)  Pulse: 80 (04/07/23 2206)  Resp: 20 (04/08/23 0452)  BP: 130/77 (04/08/23 0452)  SpO2: 100 % (04/08/23 0452) Vital Signs (24h Range):  Temp:  [96.9 °F (36.1 °C)-98.2 °F (36.8 °C)] 96.9 °F (36.1 °C)  Pulse:  [77-82] 80  Resp:  [17-23] 20  SpO2:  [95 %-100 %] 100 %  BP: (113-153)/() 130/77     Weight: 93 kg (205 lb)  Body mass index is 27.8 kg/m².    SpO2: 100 %       No intake or output data in the 24  hours ending 04/08/23 0645    Lines/Drains/Airways       Peripheral Intravenous Line  Duration                  Peripheral IV - Single Lumen 04/08/23 0400 20 G Anterior;Right Forearm <1 day                    Significant Labs:  Recent Results (from the past 72 hour(s))   Comprehensive metabolic panel    Collection Time: 04/07/23  1:05 PM   Result Value Ref Range    Sodium Level 146 (H) 136 - 145 mmol/L    Potassium Level 4.3 3.5 - 5.1 mmol/L    Chloride 115 (H) 98 - 107 mmol/L    Carbon Dioxide 21 (L) 23 - 31 mmol/L    Glucose Level 182 (H) 82 - 115 mg/dL    Blood Urea Nitrogen 48.7 (H) 8.4 - 25.7 mg/dL    Creatinine 1.32 (H) 0.73 - 1.18 mg/dL    Calcium Level Total 8.6 (L) 8.8 - 10.0 mg/dL    Protein Total 5.0 (L) 5.8 - 7.6 gm/dL    Albumin Level 2.2 (L) 3.4 - 4.8 g/dL    Globulin 2.8 2.4 - 3.5 gm/dL    Albumin/Globulin Ratio 0.8 (L) 1.1 - 2.0 ratio    Bilirubin Total 4.4 (H) <=1.5 mg/dL    Alkaline Phosphatase 92 40 - 150 unit/L    Alanine Aminotransferase 176 (H) 0 - 55 unit/L    Aspartate Aminotransferase 100 (H) 5 - 34 unit/L    eGFR 58 mls/min/1.73/m2   Troponin I    Collection Time: 04/07/23  1:05 PM   Result Value Ref Range    Troponin-I 1.266 (H) 0.000 - 0.045 ng/mL   APTT    Collection Time: 04/07/23  1:05 PM   Result Value Ref Range    PTT 37.0 (H) 23.2 - 33.7 seconds   Protime-INR    Collection Time: 04/07/23  1:05 PM   Result Value Ref Range    PT 22.8 (H) 12.5 - 14.5 seconds    INR 2.06 (H) 0.00 - 1.30   CPK    Collection Time: 04/07/23  1:05 PM   Result Value Ref Range    Creatine Kinase 608 (H) 30 - 200 U/L   CBC with Differential    Collection Time: 04/07/23  1:05 PM   Result Value Ref Range    WBC 12.3 (H) 4.5 - 11.5 x10(3)/mcL    RBC 4.18 (L) 4.70 - 6.10 x10(6)/mcL    Hgb 12.2 (L) 14.0 - 18.0 g/dL    Hct 37.9 (L) 42.0 - 52.0 %    MCV 90.7 80.0 - 94.0 fL    MCH 29.2 27.0 - 31.0 pg    MCHC 32.2 (L) 33.0 - 36.0 g/dL    RDW 20.7 (H) 11.5 - 17.0 %    Platelet 57 (L) 130 - 400 x10(3)/mcL    MPV       Neut % 86.1 %    Lymph % 4.7 %    Mono % 8.8 %    Eos % 0.0 %    Basophil % 0.2 %    Lymph # 0.58 (L) 0.6 - 4.6 x10(3)/mcL    Neut # 10.58 (H) 2.1 - 9.2 x10(3)/mcL    Mono # 1.08 0.1 - 1.3 x10(3)/mcL    Eos # 0.00 0 - 0.9 x10(3)/mcL    Baso # 0.02 0 - 0.2 x10(3)/mcL    IG# 0.03 0 - 0.04 x10(3)/mcL    IG% 0.2 %    NRBC% 0.7 %   Lipase    Collection Time: 04/07/23  1:05 PM   Result Value Ref Range    Lipase Level 27 <=60 U/L   Echo    Collection Time: 04/07/23  3:11 PM   Result Value Ref Range    BSA 2.17 m2    TDI SEPTAL 0.05 m/s    LV LATERAL E/E' RATIO 12.00 m/s    LV SEPTAL E/E' RATIO 14.40 m/s    Right Atrial Pressure (from IVC) 15 mmHg    EF 15 %    Left Ventricular Outflow Tract Mean Velocity 0.49 cm/s    Left Ventricular Outflow Tract Mean Gradient 1.00 mmHg    TDI LATERAL 0.06 m/s    LVIDd 6.47 (A) 3.5 - 6.0 cm    IVS 1.11 (A) 0.6 - 1.1 cm    Posterior Wall 0.92 0.6 - 1.1 cm    LVIDs 6.07 (A) 2.1 - 4.0 cm    FS 6 28 - 44 %    Sinus 4.00 cm    LV mass 286.21 g    LA size 5.60 cm    Left Ventricle Relative Wall Thickness 0.28 cm    AV mean gradient 3 mmHg    AV valve area 1.71 cm2    AV Velocity Ratio 0.68     AV index (prosthetic) 0.49     MV mean gradient 2 mmHg    MV valve area p 1/2 method 3.33 cm2    MV valve area by continuity eq 1.90 cm2    E/A ratio 1.53     Mean e' 0.06 m/s    E wave deceleration time 140.00 msec    LVOT diameter 2.10 cm    LVOT area 3.5 cm2    LVOT peak mateo 0.80 m/s    LVOT peak VTI 11.40 cm    Ao peak mateo 1.17 m/s    Ao VTI 23.1 cm    LVOT stroke volume 39.47 cm3    AV peak gradient 5 mmHg    MV peak gradient 4 mmHg    TV rest pulmonary artery pressure 39 mmHg    E/E' ratio 13.09 m/s    MV Peak E Mateo 0.72 m/s    TR Max Mateo 2.47 m/s    MV VTI 20.8 cm    MV stenosis pressure 1/2 time 66.00 ms    MV Peak A Mateo 0.47 m/s    LV Systolic Volume 185.00 mL    LV Systolic Volume Index 86.0 mL/m2    LV Diastolic Volume 214.00 mL    LV Diastolic Volume Index 99.53 mL/m2    LV Mass Index 133 g/m2     Triscuspid Valve Regurgitation Peak Gradient 24 mmHg    LA Volume Index (Mod) 47.9 mL/m2    LA volume (mod) 103.00 cm3   Troponin I    Collection Time: 04/07/23  6:58 PM   Result Value Ref Range    Troponin-I 1.061 (H) 0.000 - 0.045 ng/mL   BNP    Collection Time: 04/07/23 10:21 PM   Result Value Ref Range    Natriuretic Peptide 3,249.1 (H) <=100.0 pg/mL   Lactate Dehydrogenase    Collection Time: 04/07/23 10:21 PM   Result Value Ref Range    Lactate Dehydrogenase 475 (H) 125 - 220 U/L   Haptoglobin    Collection Time: 04/07/23 10:21 PM   Result Value Ref Range    Haptoglobin 77 40 - 368 mg/dL   Fibrinogen    Collection Time: 04/07/23 10:21 PM   Result Value Ref Range    Fibrinogen 194.0 (L) 210.0 - 463.0 mg/dL   Troponin I    Collection Time: 04/07/23 10:21 PM   Result Value Ref Range    Troponin-I 1.289 (H) 0.000 - 0.045 ng/mL   Urinalysis, Reflex to Urine Culture    Collection Time: 04/08/23  3:52 AM    Specimen: Urine   Result Value Ref Range    Color, UA Yellow Yellow, Light-Yellow, Dark Yellow, Aletha, Straw    Appearance, UA Clear Clear    Specific Gravity, UA 1.008 1.005 - 1.030    pH, UA 5.0 5.0 - 8.5    Protein, UA Negative Negative mg/dL    Glucose, UA Negative Negative, Normal mg/dL    Ketones, UA Negative Negative mg/dL    Blood, UA Negative Negative unit/L    Bilirubin, UA Negative Negative mg/dL    Urobilinogen, UA 0.2 0.2, 1.0, Normal mg/dL    Nitrites, UA Negative Negative    Leukocyte Esterase, UA Trace (A) Negative unit/L   Protein/Creatinine Ratio, Urine    Collection Time: 04/08/23  3:52 AM   Result Value Ref Range    Urine Protein Level <6.8 mg/dL    Urine Creatinine 5.7 (L) 63.0 - 166.0 mg/dL   Sodium, Random Urine    Collection Time: 04/08/23  3:52 AM   Result Value Ref Range    Urine Sodium 106.0 mmol/L   Urea Nitrogen, Random Urine    Collection Time: 04/08/23  3:52 AM   Result Value Ref Range    Urine Urea Nitrogen 128.0 mg/dL   Urinalysis, Microscopic    Collection Time: 04/08/23  3:52  AM   Result Value Ref Range    RBC, UA <5 <=5 /HPF    WBC, UA <5 <=5 /HPF    Squamous Epithelial Cells, UA <5 <=5 /HPF    Bacteria, UA None Seen None Seen, Rare, Occasional /HPF   Comprehensive Metabolic Panel    Collection Time: 04/08/23  4:31 AM   Result Value Ref Range    Sodium Level 149 (H) 136 - 145 mmol/L    Potassium Level 4.1 3.5 - 5.1 mmol/L    Chloride 114 (H) 98 - 107 mmol/L    Carbon Dioxide 22 (L) 23 - 31 mmol/L    Glucose Level 150 (H) 82 - 115 mg/dL    Blood Urea Nitrogen 48.9 (H) 8.4 - 25.7 mg/dL    Creatinine 1.06 0.73 - 1.18 mg/dL    Calcium Level Total 8.9 8.8 - 10.0 mg/dL    Protein Total 5.2 (L) 5.8 - 7.6 gm/dL    Albumin Level 2.3 (L) 3.4 - 4.8 g/dL    Globulin 2.9 2.4 - 3.5 gm/dL    Albumin/Globulin Ratio 0.8 (L) 1.1 - 2.0 ratio    Bilirubin Total 5.6 (H) <=1.5 mg/dL    Alkaline Phosphatase 96 40 - 150 unit/L    Alanine Aminotransferase 167 (H) 0 - 55 unit/L    Aspartate Aminotransferase 80 (H) 5 - 34 unit/L    eGFR >60 mls/min/1.73/m2   Magnesium    Collection Time: 04/08/23  4:31 AM   Result Value Ref Range    Magnesium Level 2.10 1.60 - 2.60 mg/dL   Phosphorus    Collection Time: 04/08/23  4:31 AM   Result Value Ref Range    Phosphorus Level 4.0 2.3 - 4.7 mg/dL   Hemoglobin A1c if not done in past 3 months    Collection Time: 04/08/23  4:31 AM   Result Value Ref Range    Hemoglobin A1c 10.7 (H) <=7.0 %    Estimated Average Glucose 260.4 mg/dL   CBC with Differential    Collection Time: 04/08/23  4:31 AM   Result Value Ref Range    WBC 8.7 4.5 - 11.5 x10(3)/mcL    RBC 4.56 (L) 4.70 - 6.10 x10(6)/mcL    Hgb 13.1 (L) 14.0 - 18.0 g/dL    Hct 42.2 42.0 - 52.0 %    MCV 92.5 80.0 - 94.0 fL    MCH 28.7 27.0 - 31.0 pg    MCHC 31.0 (L) 33.0 - 36.0 g/dL    RDW 21.1 (H) 11.5 - 17.0 %    Platelet 52 (L) 130 - 400 x10(3)/mcL    MPV      NRBC% 1.4 %   Manual Differential    Collection Time: 04/08/23  4:31 AM   Result Value Ref Range    Neut Man 97 %    Monocyte Man 1 %    [place] Man 1 %    Myelo Man 2 %     Instr WBC 8.7 x10(3)/mcL    Abs Mono 0.087 (L) 0.1 - 1.3 x10(3)/mcL    Abs Neut 8.7 2.1 - 9.2 x10(3)/mcL    NRBC Man 5 %    RBC Morph Abnormal (A) Normal    Anisocyte 1+ (A) (none)    Poik 3+ (A) (none)    Macrocyte 2+ (A) (none)    Target Cell 1+ (A) (none)    Shepherd Cells 2+ (A) (none)    Platelet Est Decreased (A) Normal, Adequate   Bilirubin, Direct    Collection Time: 04/08/23  4:31 AM   Result Value Ref Range    Bilirubin Direct 3.4 (H) 0.0 - <0.5 mg/dL       Significant Imaging:  Imaging Results              US Abdomen Limited (Preliminary result)  Result time 04/07/23 23:06:34      Preliminary result by David Tomas MD (04/07/23 23:06:34)                   Narrative:    START OF REPORT:  Technique: Limited right upper quadrant abdominal ultrasound was performed.    Comparison: None.    Clinical History: Cholecystitis.    Findings: There is small right pleural effusion. There is mild ascites.  Liver: The liver is mildly enlarged and measures 20 centimeters in greatest dimension and demonstrates increased echogenicity which may reflect fatty infiltration.  Biliary ducts: The common bile duct is within normal limits at 2.4 mm in diameter.  Gallbladder: The gallbladder appears full but non-distended. The gallbladder wall measures 3.5 mm in thickness. There is no pericholecystic fluid. The sonographic [place] sign is positive. There are two tiny echogenic foci along the gallbladder wall, which measure 9 x 5 x 5 mm and 7 x 3 x 6 mm, which may reflect polyps.  Pancreas: The pancreas cannot be definitively evaluated due to obscuration by bowel gas.  Kidneys:  Right kidney: There is mild right hydronephrosis.  Dimensions: The right kidney measures 10.3 x 5.2 x 7.5 cm.  Vascular:  Portal vein: Flow parameters are within normal limits with hepatopetal flow.  IVC: The IVC is within normal limits.      Impression:  1. The gallbladder appears full but non-distended. The gallbladder wall measures 3.5 mm in thickness. The  sonographic [place] sign is positive. There are two tiny echogenic foci along the gallbladder wall, which measure 9 x 5 x 5 mm and 7 x 3 x 6 mm, which may reflect polyps. Correlate with clinical and laboratory findings as regards additional evaluation and follow-up as the above findings could reflect cholecystitis although the presence of significant ascites decreases sensitivity and specificity.  2. There is mild right hydronephrosis.  3. There is small right pleural effusion. There is mild ascites.  4. Mild hepatomegaly with diffuse fatty infiltration.  5. Details and other findings as noted above.                          Preliminary result by [employer], Rad Results In (04/07/23 23:06:34)                   Narrative:    START OF REPORT:  Technique: Limited right upper quadrant abdominal ultrasound was performed.    Comparison: None.    Clinical History: Cholecystitis.    Findings: There is small right pleural effusion. There is mild ascites.  Liver: The liver is mildly enlarged and measures 20 centimeters in greatest dimension and demonstrates increased echogenicity which may reflect fatty infiltration.  Biliary ducts: The common bile duct is within normal limits at 2.4 mm in diameter.  Gallbladder: The gallbladder appears full but non-distended. The gallbladder wall measures 3.5 mm in thickness. There is no pericholecystic fluid. The sonographic [place] sign is positive. There are two tiny echogenic foci along the gallbladder wall, which measure 9 x 5 x 5 mm and 7 x 3 x 6 mm, which may reflect polyps.  Pancreas: The pancreas cannot be definitively evaluated due to obscuration by bowel gas.  Kidneys:  Right kidney: There is mild right hydronephrosis.  Dimensions: The right kidney measures 10.3 x 5.2 x 7.5 cm.  Vascular:  Portal vein: Flow parameters are within normal limits with hepatopetal flow.  IVC: The IVC is within normal limits.      Impression:  1. The gallbladder appears full but non-distended. The  gallbladder wall measures 3.5 mm in thickness. The sonographic [place] sign is positive. There are two tiny echogenic foci along the gallbladder wall, which measure 9 x 5 x 5 mm and 7 x 3 x 6 mm, which may reflect polyps. Correlate with clinical and laboratory findings as regards additional evaluation and follow-up as the above findings could reflect cholecystitis although the presence of significant ascites decreases sensitivity and specificity.  2. There is mild right hydronephrosis.  3. There is small right pleural effusion. There is mild ascites.  4. Mild hepatomegaly with diffuse fatty infiltration.  5. Details and other findings as noted above.                                         CT Head Without Contrast (Final result)  Result time 04/07/23 14:00:32      Final result by Dong Grant MD (04/07/23 14:00:32)                   Narrative:    EXAMINATION  CT HEAD WITHOUT CONTRAST    CLINICAL HISTORY  Head trauma, moderate-severe;trauma;    TECHNIQUE  Axial non-contrast CT images of the head were acquired and multiplanar reconstructions accomplished by a CT technologist at a separate workstation, pushed to PACS for physician review.    COMPARISON  None available at the time of the initial interpretation.    FINDINGS  Images were reviewed in subdural, brain, soft tissue, and bone windows.    Exam quality: Motion/streak artifact limits assessment of the posterior fossa.    Hemorrhage: No evidence of acute hyperattenuating blood products.    Parenchyma: There is diffuse bilateral supratentorial white matter hypoattenuation, typical of chronic microvascular changes. No discrete mass, mass effect, or CT evidence of acute large vascular territory insult. Gray-white differentiation is preserved.    Midline shift: None.    CSF spaces: Proportional appearance of ventricular and sulcal enlargement. No hydrocephalus. No masses or fluid collections.    Vasculature: No focally hyperdense artery. Scattered carotid siphon  calcifications are present. No abnormal densities within the dural sinuses.    Other findings: No abnormalities of the scalp or subjacent osseous structures. Mastoids are well aerated. No focal abnormality of the sella. The included facial structures are unremarkable.    IMPRESSION  1. No acute intracranial abnormality.  2. Age-related atrophy and chronic sequela of white matter microvascular disease.  3. Additional chronic secondary details discussed above.    RADIATION DOSE  Automated tube current modulation, weight-based exposure dosing, and/or iterative reconstruction technique utilized to reach lowest reasonably achievable exposure rate.    DLP: 956 mGy*cm      Electronically signed by: Dong Grant  Date:    04/07/2023  Time:    14:00                                      CT Abdomen Pelvis With Contrast (Final result)  Result time 04/07/23 14:07:39      Final result by Lissette Lambert MD (04/07/23 14:07:39)                   Impression:      1. Anasarca.  Bilateral pleural effusions, free intraperitoneal fluid, mesenteric edema, retroperitoneal edema, presacral edema, body wall edema  2. Filling defect at the left ventricular apex highly suspicious for thrombus.  Correlate with echocardiogram.  3. Cardiomegaly  4. It is difficult to exclude bowel wall edema particularly at the colon which is decompressed.  5. Severe aortoiliac atherosclerosis  6.  This report was flagged in Epic as abnormal.      Electronically signed by: Lissette Lambert  Date:    04/07/2023  Time:    14:07               Narrative:    EXAMINATION:  CT ABDOMEN PELVIS WITH CONTRAST    CLINICAL HISTORY:  Abdominal pain, acute, nonlocalized;    TECHNIQUE:  Helically acquired images with axial, sagittal and coronal reformations were obtained from the lung bases to the pubic symphysis after the IV administration of contrast.    Automated tube current modulation, weight-based exposure dosing, and/or iterative reconstruction technique utilized to  reach lowest reasonably achievable exposure rate.    DLP: 1651 mGy*cm    COMPARISON:  CT chest 11/04/2020    FINDINGS:  LIMITATIONS: Beam hardening artifact related to placement of patient's arms down by the sides.    HEART: Cardiomegaly.  Filling defect at the left ventricular apex measuring approximately 4.3 cm (2, 11).  Distal aspect of a pacer device is seen at the right atrium and right ventricular apex.    LUNG BASES: There are small bilateral pleural effusions.  Basilar atelectasis.    LIVER: Normal attenuation. No appreciable focal hepatic lesion.    BILIARY: Possible small dependently layering gallstone.    PANCREAS: No inflammatory change.    SPLEEN: Normal in size    ADRENALS: No mass.    KIDNEYS/URETERS: Beam hardening artifact obscures evaluation of the kidneys.  Left renal cyst requires no dedicated imaging follow-up.  No hydronephrosis seen.    GI TRACT/MESENTERY:  No evidence of bowel obstruction.  The colon is decompressed which limits evaluation for wall thickening.    PERITONEUM AND RETROPERITONEUM: There is small volume free intraperitoneal fluid.  There is mesenteric edema.  There is retroperitoneal edema.  Presacral edema.No free air.    LYMPH NODES: No enlarged lymph nodes by size criteria.    VASCULATURE: Severe aortoiliac atherosclerosis.    BLADDER: Normal appearance given degree of distention.    REPRODUCTIVE ORGANS: Normal as visualized.    SOFT TISSUES: Body wall edema.    BONES: No acute osseous abnormality.                                       X-Ray Hip 2 or 3 views Right (with Pelvis when performed) (Final result)  Result time 04/07/23 13:28:25      Final result by Lissette Lambert MD (04/07/23 13:28:25)                   Impression:      No acute osseous abnormality.      Electronically signed by: Lissette Lambert  Date:    04/07/2023  Time:    13:28               Narrative:    EXAMINATION:  XR HIP WITH PELVIS WHEN PERFORMED, 2 OR 3  VIEWS RIGHT    CLINICAL HISTORY:  Unspecified  fall, initial encounter    TECHNIQUE:  AP view of the pelvis and AP and frog leg lateral view of the right hip were performed.    COMPARISON:  None.    FINDINGS:  BONE: No fracture. No dislocation.    SOFT TISSUES: Calcific atherosclerosis.                                      EKG:    Results for orders placed or performed during the hospital encounter of 04/07/23   EKG 12-lead    Narrative    Test Reason : I48.0,    Vent. Rate : 079 BPM     Atrial Rate : 077 BPM     P-R Int : 000 ms          QRS Dur : 174 ms      QT Int : 548 ms       P-R-T Axes : 000 -83 088 degrees     QTc Int : 628 ms    Ventricular-paced rhythm  Abnormal ECG  Confirmed by Nahid Sen MD ([id number]) on 4/7/2023 2:53:39 PM    Referred By: SIVAN   SELF           Confirmed By:Nahid Sen MD       Physical Exam  Cardiovascular:      Rate and Rhythm: Normal rate and regular rhythm.      Heart sounds: Normal heart sounds.   Pulmonary:      Effort: Pulmonary effort is normal.   Skin:     Comments: Cool LE       Home Medications:   No current facility-administered medications on file prior to encounter.     Current Outpatient Medications on File Prior to Encounter   Medication Sig Dispense Refill    apixaban (ELIQUIS) 5 mg Tab Take 5 mg by mouth.      aspirin (ECOTRIN) 81 MG EC tablet Take 1 tablet (81 mg total) by mouth once daily. 30 tablet 1    atorvastatin (LIPITOR) 80 MG tablet Take 80 mg by mouth.      carvediloL (COREG) 12.5 MG tablet Take 12.5 mg by mouth 2 (two) times daily.      clopidogreL (PLAVIX) 75 mg tablet Take 75 mg by mouth.      famotidine (PEPCID) 20 MG tablet Take 20 mg by mouth.      losartan (COZAAR) 50 MG tablet Take 1 tablet by mouth once daily.      metFORMIN (GLUCOPHAGE) 1000 MG tablet Take 1,000 mg by mouth.      mupirocin (BACTROBAN) 2 % ointment Apply to nostrils twice daily 1 Tube 0    nitroGLYCERIN (NITROSTAT) 0.4 MG SL tablet DISSOLVE 1 TABLET UNDER THE TONGUE EVERY 5 MINUTES AS NEEDED FOR CHEST PAIN FOR UP TO 3 DOSES. IF  NO RELIEF, SEEK MEDICAL ATTENTION      sacubitriL-valsartan (ENTRESTO) 24-26 mg per tablet Take by mouth.         Current Inpatient Medications:    Current Facility-Administered Medications:     acetaminophen tablet 1,000 mg, 1,000 mg, Oral, Q6H PRN, Nate Robledo MD    acetaminophen tablet 650 mg, 650 mg, Oral, Q4H PRN, Nate Robledo MD    aluminum-magnesium hydroxide-simethicone 200-200-20 mg/5 mL suspension 30 mL, 30 mL, Oral, QID PRN, Nate Robledo MD    cloNIDine tablet 0.2 mg, 0.2 mg, Oral, TID PRN, Nate Robledo MD    dextrose 10% bolus 125 mL 125 mL, 12.5 g, Intravenous, PRN, Nate Robledo MD    dextrose 10% bolus 250 mL 250 mL, 25 g, Intravenous, PRN, Nate Robledo MD    furosemide injection 80 mg, 80 mg, Intravenous, BID, Nate Robledo MD    glucagon (human recombinant) injection 1 mg, 1 mg, Intramuscular, PRN, Nate Robledo MD    hydrALAZINE injection 10 mg, 10 mg, Intravenous, Q4H PRN, Nate Robledo MD    insulin aspart U-100 injection 0-5 Units, 0-5 Units, Subcutaneous, Q6H PRN, Nate Robledo MD    melatonin tablet 6 mg, 6 mg, Oral, Nightly PRN, Nate Robledo MD    ondansetron injection 4 mg, 4 mg, Intravenous, Q4H PRN, Nate Robledo MD    piperacillin-tazobactam (ZOSYN) 4.5 g in dextrose 5 % in water (D5W) 5 % 100 mL IVPB (MB+), 4.5 g, Intravenous, Q8H, Nate Robledo MD, Last Rate: 25 mL/hr at 04/08/23 0542, 4.5 g at 04/08/23 0542    polyethylene glycol packet 17 g, 17 g, Oral, BID PRN, Nate Robledo MD    prochlorperazine injection Soln 5 mg, 5 mg, Intravenous, Q6H PRN, Nate Robledo MD    senna-docusate 8.6-50 mg per tablet 2 tablet, 2 tablet, Oral, BID PRN, Nate Robledo MD    sodium chloride 0.9% flush 10 mL, 10 mL, Intravenous, PRN, Nate Robledo MD         VTE Risk Mitigation (From admission, onward)           Ordered     IP VTE LOW RISK PATIENT  Once         04/07/23 2156     Place sequential compression device  Until discontinued         04/07/23 2156                    Assessment:   See below MDM formulated by  "me (Nahid Sen MD):     NSTEMI, unspecified   --troponin 1.266-1.061-1.289  Native CAD  --hx CABG 3V 2021  ICMO  Chronic combined systolic and diastolic HF  --EF 15%, grade I LVDD  --BNP 3249  LV thrombus  Anemia  Thrombocytopenia  --plts decreasing 52  PAFib  --CHADS VASC score 5  --on eliquis outpatient  VHD  --mild MR/TR  HTN  PAD  HLD  T2DM  --A1c 10.7  CINTHIA  Congestive hepatopathy  Hx CHB  --dual chamber PPM  Tobacco dependence    Plan:   Avoid anticoagulation due to thrombocytopenia. Hold aspirin and plavix  Obtain CTA aorta with runoff to assess PAD  Apply NTP 1" to left foot  Start Lasix 80mg IVP bid  Strict I&Os/daily weights  Resume entresto 24-26mg bid and coreg 12.5mg bid         Thank you for your consult.     ZEYNEP Cast and Nahid Sen MD  Cardiology  [facility] Tele[facility]  04/08/2023 6:45 AM   "

## 2023-04-08 NOTE — NURSING
Nurses Note -- 4 Eyes      4/8/2023   5:33 AM      Skin assessed during: Admit      [] No Pressure Injuries Present    []Prevention Measures Documented      [x] Yes- Altered Skin Integrity Present or Discovered   [x] LDA Added if Not in Epic (Describe Wound)   [x] New Altered Skin Integrity was Present on Admit and Documented in LDA   [x] Wound Image Taken    Wound Care Consulted? Yes    Attending Nurse:  Connie Locke RN     Second RN/Staff Member:  Tressa Valdez RN

## 2023-04-09 NOTE — PT/OT/SLP EVAL
Speech Language Pathology Department  Clinical Swallow Evaluation    Patient Name:  Darren Patel   MRN:  [id number]    Recommendations:     General recommendations:  Modified Barium Swallow Study  Diet recommendations:  NPO, Liquid Diet Level: NPO, Other (Comment) (meds crushed in puree)   Precautions: Standard,      History:       Past Medical History:   Diagnosis Date    Diabetes mellitus     High cholesterol     Hypertension      Past Surgical History:   Procedure Laterality Date    A-V CARDIAC PACEMAKER INSERTION Left 11/9/2020    Procedure: INSERTION, CARDIAC PACEMAKER, DUAL CHAMBER;  Surgeon: Nolan Hernandez MD;  Location: [facility] CATH LAB;  Service: Cardiology;  Laterality: Left;    CORONARY ANGIOGRAPHY N/A 11/9/2020    Procedure: ANGIOGRAM, CORONARY ARTERY - right radial;  Surgeon: Nolan Hernandez MD;  Location: [facility] CATH LAB;  Service: Cardiology;  Laterality: N/A;     Home Diet: Unknown  Current Method of Nutrition: NPO    Subjective     Patient awake and alert.    Pain/Comfort: Pain Rating 1: 0/10    Objective:     Oral Musculature Evaluation  Oral Musculature: WFL  Secretion Management: adequate  Mandibular Strength and Mobility: WFL  Oral Labial Strength and Mobility: WFL  Buccal Strength and Mobility: WFL  Voice Prior to PO Intake: rough/hoarse vocal quality    Consistency Fed By Oral Symptoms Pharyngeal Symptoms   Thin liquid by cup SLP None Multiple swallows  Wet vocal quality after swallow  Cough after the swallow   Thin liquid by straw SLP None Multiple swallows  Wet vocal quality after swallow  Cough after the swallow   Puree SLP None None     Limited PO trials due to inconsistent cooperation/participation with ST.    Assessment:     Patient would benefit from MBS when participation/agreeability improves. Continue NPO. Crushed meds in puree. ST to continue to follow for further assessment.    Goals:     Multidisciplinary Problems       SLP Goals       Not on file                  Patient Education:      Patient provided with verbal education regarding ST POC.  Additional teaching is warranted.    Plan:     SLP Follow-Up:  Yes   Patient to be seen:  5 x/week   Plan of Care reviewed with:  patient      Time Tracking:     SLP Treatment Date:   04/09/23  Speech Start Time:  1140  Speech Stop Time:  1200     Speech Total Time (min):  20 min    Billable minutes:  Swallow and Oral Function Evaluation, 20 minutes      04/09/2023

## 2023-04-09 NOTE — PROGRESS NOTES
"   Acute Care Surgery   Progress Note  Admit Date: 4/7/2023  HD#2  POD#* No surgery found *    Subjective:   Interval history:  Afebrile vitals stable  Cant talk much due to SOB  Has been on 3L NC  States no abdominal pain     Scheduled Meds:   carvediloL  12.5 mg Oral BID    furosemide (LASIX) injection  80 mg Intravenous BID    nitroGLYCERIN 2% TD oint  1 inch Topical (Top) Q6H    piperacillin-tazobactam (ZOSYN) IVPB  4.5 g Intravenous Q8H    potassium chloride  40 mEq Intravenous Once    potassium chloride  40 mEq Oral Once    sacubitriL-valsartan  1 tablet Oral BID    sodium chloride 0.9%  10 mL Intravenous Q6H     Continuous Infusions:  PRN Meds:acetaminophen, acetaminophen, aluminum-magnesium hydroxide-simethicone, cloNIDine, dextrose 10%, dextrose 10%, glucagon (human recombinant), hydrALAZINE, insulin aspart U-100, melatonin, ondansetron, polyethylene glycol, prochlorperazine, senna-docusate 8.6-50 mg, sodium chloride 0.9%, Flushing PICC Protocol **AND** sodium chloride 0.9% **AND** sodium chloride 0.9%     Objective:     VITAL SIGNS: 24 HR MIN & MAX LAST   Temp  Min: 96.1 °F (35.6 °C)  Max: 99.7 °F (37.6 °C)  99.7 °F (37.6 °C)   BP  Min: 111/57  Max: 125/68  125/68    Pulse  Min: 79  Max: 80  80    Resp  Min: 18  Max: 22  (!) 22    SpO2  Min: 95 %  Max: 97 %  97 %      HT: 6' 0.01" (182.9 cm)  WT: 85 kg (187 lb 6.3 oz)  BMI: 25.4     Intake/output:  Intake/Output - Last 3 Shifts         04/07 0700 04/08 0659 04/08 0700 04/09 0659 04/09 0700  04/10 0659    Urine (mL/kg/hr)  5250 (2.6)     Stool  0     Total Output  5250     Net  -5250            Stool Occurrence  2 x             Intake/Output Summary (Last 24 hours) at 4/9/2023 0942  Last data filed at 4/9/2023 0630  Gross per 24 hour   Intake --   Output 4000 ml   Net -4000 ml        Lines/drains/airway:  PICC Double Lumen 04/08/23 1236 right basilic (Active)   $ PICC Line Charges (Upon insertion) Bedside Insertion Performed Pt > 5 Years Old (no subq " port/pump or image guidance);Catheter - Double Lumen (Supply) 04/08/23 1250   Line Necessity Review Medication caustic to vasculature 04/08/23 1250   Verification by X-ray Yes 04/08/23 1250   Site Assessment No drainage;No redness;No swelling;No warmth 04/08/23 2000   Extremity Assessment Distal to IV No abnormal discoloration 04/08/23 2000   Line Securement Device Secured with sutureless device 04/08/23 2000   Dressing Type Central line dressing 04/08/23 2000   Dressing Status Clean;Dry;Intact 04/08/23 2000   Dressing Intervention Integrity maintained 04/08/23 2000   Date on Dressing 04/08/23 04/08/23 2000   Dressing Due to be Changed 04/15/23 04/08/23 2000   Distal Patency/Care flushed w/o difficulty;blood return present 04/08/23 2000   Proximal 1 Patency/Care flushed w/o difficulty;blood return present 04/08/23 2000   Current Insertion Depth (cm) 39 cm 04/08/23 1250   Current Exposed Catheter (cm) 0 cm 04/08/23 1250   Extremity Circumference (cm) 30 cm 04/08/23 1250   Number of days: 0       Physical examination:  Gen: NAD, AAOx3, answering questions appropriately  HEENT:  CV: RR  Resp: NWOB  Abd: S/NT/ND  Ext: moving all extremities spontaneously and purposefully  Neuro: CN II-XII grossly intact  Skin/wounds:    Labs:  Renal:  Recent Labs     04/07/23  1305 04/08/23  0431 04/09/23  0519   BUN 48.7* 48.9* 43.2*   CREATININE 1.32* 1.06 1.17     No results for input(s): LACTIC in the last 72 hours.  FENGI:  Recent Labs     04/07/23  1305 04/08/23  0431 04/09/23  0519   * 149* 154*   K 4.3 4.1 2.8*   CO2 21* 22* 31   CALCIUM 8.6* 8.9 8.4*   MG  --  2.10 1.90   PHOS  --  4.0  --    ALBUMIN 2.2* 2.3* 2.1*   BILITOT 4.4* 5.6* 6.5*   * 80* 73*   ALKPHOS 92 96 91   * 167* 128*     Heme:  Recent Labs     04/07/23  1305 04/08/23  0431 04/09/23  0519   HGB 12.2* 13.1* 11.4*   HCT 37.9* 42.2 35.4*   PLT 57* 52* 40*   PTT 37.0*  --   --    INR 2.06*  --   --      ID:  Recent Labs     04/07/23  1305  04/08/23  0431 04/09/23  0519   WBC 12.3* 8.7 10.5     CBG:  Recent Labs     04/07/23  1305 04/08/23  0431 04/09/23  0519   GLUCOSE 182* 150* 132*      Cardiovascular:  Recent Labs   Lab 04/07/23  1305 04/07/23  1858 04/07/23  2221   TROPONINI 1.266* 1.061* 1.289*   BNP  --   --  3,249.1*     I have reviewed all pertinent lab results within the past 24 hours.    Imaging:  CTA Runoff ABD PEL Bilat Lower Ext   Final Result      1. Extensive atherosclerotic changes of the abdominal aorta and its branches with bilateral peripheral vascular disease.   2. Minimal scattered bilateral three-vessel runoff.         Electronically signed by: Franchesca Valdez   Date:    04/08/2023   Time:    15:49      X-Ray Chest 1 View   Final Result      Right-sided PICC line with tip over the superior vena cava.         Electronically signed by: Franchesca Valdez   Date:    04/08/2023   Time:    13:28      X-Ray Chest 1 View   Final Result      Small left pleural effusion.         Electronically signed by: Franchesca Valdez   Date:    04/08/2023   Time:    09:13      US Abdomen Limited   Final Result   Impression:      1. The gallbladder appears full but non-distended. The gallbladder wall measures 3.5 mm in thickness. The sonographic [place] sign is positive. There are two tiny echogenic foci along the gallbladder wall, which measure 9 x 5 x 5 mm and 7 x 3 x 6 mm, which may reflect polyps. Correlate with clinical and laboratory findings as regards additional evaluation and follow-up as the above findings could reflect cholecystitis although the presence of significant ascites decreases sensitivity and specificity.      2. There is mild right hydronephrosis.      3. There is small right pleural effusion. There is mild ascites.      4. Mild hepatomegaly with diffuse fatty infiltration.      5. Details and other findings as noted above.      No significant discrepancy with overnight report.         Electronically signed by: Noel Yoon    Date:    04/08/2023   Time:    09:35      CT Head Without Contrast   Final Result      CT Abdomen Pelvis With Contrast   Final Result   Abnormal      1. Anasarca.  Bilateral pleural effusions, free intraperitoneal fluid, mesenteric edema, retroperitoneal edema, presacral edema, body wall edema   2. Filling defect at the left ventricular apex highly suspicious for thrombus.  Correlate with echocardiogram.   3. Cardiomegaly   4. It is difficult to exclude bowel wall edema particularly at the colon which is decompressed.   5. Severe aortoiliac atherosclerosis   6.  This report was flagged in Epic as abnormal.         Electronically signed by: Lissette Lambert   Date:    04/07/2023   Time:    14:07      X-Ray Hip 2 or 3 views Right (with Pelvis when performed)   Final Result      No acute osseous abnormality.         Electronically signed by: Lissette Lambert   Date:    04/07/2023   Time:    13:28         I have reviewed all pertinent imaging results/findings within the past 24 hours.    Micro/Path/Other:  Microbiology Results (last 7 days)       ** No results found for the last 168 hours. **           Specimen (168h ago, onward)      None             Assessment & Plan:   Consulted for evaluation of gallbladder  Hx: CHF EF 15% w/ L ventricle thrombus, CAD s.p CAGB, a-fib on eliquis, 3rd AV block s/p pacemaker, PAD s/p stents on plavix, T2DM, HTN, HL    No signs of cholecystitis  The edema on the gallbladder is from fluid overload  Call us back for questions  No surgical intervention  He can follow up as an outpatient for the gallbladder polyps, anyways these are small, do not suspect this is causing any problems    Sindi Donaldson MD  General Surgery Resident [id number]

## 2023-04-09 NOTE — PROGRESS NOTES
[facility]  Hospital Medicine Progress Note        Chief Complaint: Inpatient Follow-up for Unwitnessed fall/nstemi/chf/ thrombus/ thrombocytopenia     HPI: This is a 67-year-old male with medical history of ischemic cardiomyopathy/HFrEF 35% per echo 2021, CAD s/p CABG, paroxysmal AFib on Eliquis, third-degree AV block  s/p dual-chamber pacemaker, PAD/multiple interventions on Plavix, T2DM, HTN, HLD present to the ED after being found by his neighbor laying on the floor naked.  Patient has no recollection of what happened but report that he fell a day prior and currently complaining of right hip and right-sided abdominal pain.  Report over the past week he was feeling extremely weak but denies focal extremity weakness or numbness.  Denies chest pain but report dyspnea on exertion.  Denies cough, fever or chills.    On arrival to ED he was afebrile, hypertensive and saturating 97% on 3 L OxyMask.  EKG showed ventricular paced rhythm.  Labs notable for WBC 12.3, hemoglobin 12.2, platelets 57, INR 2.06, sodium 146, potassium 4.3, chloride 115, CO2 21, creatinine 1.32, BUN 48, albumin 2.2, normal alkaline phosphatase, total bilirubin 4.4, , , BNP 3249, , troponin 1.266 > 1.061, , urinalysis unremarkable.    CT head showed no acute intracranial abnormalities.      CT abdomen and pelvis show evidence of anasarca with bilateral small pleural effusion, small ascites and mesenteric edema, presacral and body wall edema, cardiomegaly and filling defect in the LV apex highly suspicious for thrombus, severe aortoiliac atherosclerosis.    Pelvic x-ray showed evidence fractures.    Echo final read pending but noted for LVEF 15%, grade 1 diastolic dysfunction, large fixed LV apical thrombus present, mobile echodensity noted on RV pacer lead.     BLE venous ultrasound showed no evidence of DVT.  BLE arterial ultrasound show bilateral severe arterial flow reduction.    He was given 1  "L of normal saline, vancomycin, Zosyn, aspirin 325mg, cardiology consulted and referred to hospital medicine service for further evaluation and management.    Interval Hx:   4/8/23 Pt tender all over. Very tender over right hip but no fx on ct abd and pelvis.  Abdominal us raises possibility of acute cholecystitis. Will  consult general surgery for eval. Worsening thrombocytopenia. Also complains of sob, I elevated head of bed and increased o2 to 3 liters for O2 sat to 3liters.  Siegel placed for retention     4/9/23 no change in physical exam. Seen by  general surgery with no findings to support acute cholecystitis.  Now on angiomax. Platelets at 40,000 with low fibrinogen/ prolonged inr. Oncology consulted by cis. There is no active bleeding. Cta abd w run off w severe atherosclerotic disease of aorta and branches. No flow below knees. Tropo elevated     Objective/physical exam:  General: moderate distress..." throw me in the river"    HEENT:  Bruise over the left forehead and neck    Neck:  JVD up to ear lobe    Chest:  decrease breath sound w some Bilateral rales at the bases    CVS: Regular rhythm. Normal S1/S2. +2 pedal edema    Abdomen: nondistended, normoactive BS, epigastric and right upper quadrant tenderness and voluntary guarding    MSK: No obvious deformity or joint swelling, bilateral feet feels warm  but unable to palpate pulses on the left    Skin: Warm and dry    Neuro: AAOx3, no focal neurological deficit    Psych: Cooperative but anxious    VITAL SIGNS: 24 HRS MIN & MAX LAST   Temp  Min: 97.5 °F (36.4 °C)  Max: 99.7 °F (37.6 °C) 98.8 °F (37.1 °C)   BP  Min: 111/57  Max: 126/70 126/70   Pulse  Min: 79  Max: 80  80   Resp  Min: 18  Max: 22 20     SpO2  Min: 95 %  Max: 99 % 99 %       Recent Labs   Lab 04/07/23  1305 04/08/23  0431 04/09/23  0519   WBC 12.3* 8.7 10.5   RBC 4.18* 4.56* 4.07*   HGB 12.2* 13.1* 11.4*   HCT 37.9* 42.2 35.4*   MCV 90.7 92.5 87.0   MCH 29.2 28.7 28.0   MCHC 32.2* 31.0* " 32.2*   RDW 20.7* 21.1* 21.2*   PLT 57* 52* 40*         Recent Labs   Lab 04/07/23  1305 04/08/23  0431 04/09/23  0519   * 149* 154*   K 4.3 4.1 2.8*   CO2 21* 22* 31   BUN 48.7* 48.9* 43.2*   CREATININE 1.32* 1.06 1.17   CALCIUM 8.6* 8.9 8.4*   MG  --  2.10 1.90   ALBUMIN 2.2* 2.3* 2.1*   ALKPHOS 92 96 91   * 167* 128*   * 80* 73*   BILITOT 4.4* 5.6* 6.5*            Microbiology Results (last 7 days)       ** No results found for the last 168 hours. **             See below for Radiology    Scheduled Med:   carvediloL  12.5 mg Oral BID    furosemide (LASIX) injection  80 mg Intravenous BID    nitroGLYCERIN 2% TD oint  1 inch Topical (Top) Q6H    piperacillin-tazobactam (ZOSYN) IVPB  4.5 g Intravenous Q8H    potassium chloride  40 mEq Intravenous Once    potassium chloride  40 mEq Oral Once    sacubitriL-valsartan  1 tablet Oral BID    sodium chloride 0.9%  10 mL Intravenous Q6H        Abdominal ultrasound - 1. The gallbladder appears full but non-distended. The gallbladder wall measures 3.5 mm in thickness. The sonographic [place] sign is positive. There are two tiny echogenic foci along the gallbladder wall, which measure 9 x 5 x 5 mm and 7 x 3 x 6 mm, which may reflect polyps. Correlate with clinical and laboratory findings as regards additional evaluation and follow-up as the above findings could reflect cholecystitis although the presence of significant ascites decreases sensitivity and specificity.     2. There is mild right hydronephrosis.     3. There is small right pleural effusion. There is mild ascites.     4. Mild hepatomegaly with diffuse fatty infiltration.     5. Details and other findings as noted above.     No significant discrepancy with overnight report.   bivulirudin (ANGIOMAX) infusion      dextrose 5 % (D5W)          PRN Meds:  sodium chloride, acetaminophen, acetaminophen, aluminum-magnesium hydroxide-simethicone, cloNIDine, dextrose 10%, dextrose 10%, glucagon (human  recombinant), hydrALAZINE, insulin aspart U-100, melatonin, ondansetron, polyethylene glycol, prochlorperazine, senna-docusate 8.6-50 mg, sodium chloride 0.9%, Flushing PICC Protocol **AND** sodium chloride 0.9% **AND** sodium chloride 0.9%, Pharmacy to dose Vancomycin consult **AND** vancomycin - pharmacy to dose       Assessment/Plan:    Unwitnessed fall/syncope  Echo -ICMO w ef 15% and LV thrombus/ RA thrombus/ mobile echodensity noted in pacer lead. While on eliquis ?  cus-The left common, external and internal carotid arteries are occluded.  -The right internal carotid artery demonstrated less than 50% stenosis.  -Bilateral vertebral arteries were patent with antegrade flow.    Acute on chronic systolic and diastolic heart failure    NSTEMI unknown type      Congestive hepatopathy versus acute cholecystitis  - surgery evaluated pt / no cholecystitis      Acute kidney injury      Acute thrombocytopenia -etiology possibly ?cardiac/LYNCH cirrhosis or infection/sepsis  - worsening   - no signs pf bleeding  - on vanco/ zosyn      Hypernatremia/hyperchloremia  - replacing       small right pleural effusion.        mild ascites.      Severe PAD lower sandra and branches       T2DM  - hemoglobin a1c > 10      mild right hydronephrosis./urinary retention - limon     HX Severe BLE PAD, HTN, HLD, CHB s/p PPM, paroxysmal AFib on Eliquis,         Plan:      Telemetry monitoring      Continue IV vanco/ Zosyn to cover possible sepsis      Lasix 80 mg IV Q12H, accurate I/O, daily weight, Keep K>4, Mg>2      Continue carvedilol and Entresto      Hold statin at this time due to elevated transaminases      Hep panel.      SSI Q6H,     Overall prognosis is very poor at this moment . Probably dealing w severe pad/ nstemi plus complications from RA/LV thrombus      Critical care time:  45 minutes    Critical care diagnosis:  Acute on chronic HFrEF/ severe electrolyte  abnormalities/nstemi. Continue angiomax for now. Cis consulted  hematology, lets see what they can offer. Meantime replace K+/ make sure Mg remains wnl. Will place on maintenance fluids w D5w w bmp q 12. Watch for worsening resp symptoms secondary to overload. Received 80 meq kcl already today. Am labs .  Case was briefly discussed with cis. Completely agree w critical condition /possibility of limb amputation,icu,worsening chf, thrombocytopenia, death.    VTE prophylaxis: angiomax    Patient condition:  Guarded    Anticipated discharge and Disposition:   tbd      All diagnosis and differential diagnosis have been reviewed; assessment and plan has been documented; I have personally reviewed the labs and test results that are presently available; I have reviewed the patients medication list; I have reviewed the consulting providers response and recommendations. I have reviewed or attempted to review medical records based upon their availability    All of the patient's questions have been  addressed and answered. Patient's is agreeable to the above stated plan. I will continue to monitor closely and make adjustments to medical management as needed.  _____________________________________________________________________    Nutrition Status:    Radiology:  CTA Runoff ABD PEL Bilat Lower Ext  Narrative: EXAMINATION:  CTA RUNOFF ABD PEL BILAT LOWER EXT    CLINICAL HISTORY:  Claudication or leg ischemia;pad; cta aorta with runoff;    TECHNIQUE:  CT images of the abdomen, pelvis, and lower extremities before and after IV contrast. Axial, coronal and sagittal reformatted images are reviewed. 3D reconstructed images were also performed for further evaluation of the vasculature. Dose length product is 2453 mGycm. Automatic exposure control, adjustment of mA/kV or iterative reconstruction technique was used to limit radiation dose.    COMPARISON:  CT abdomen pelvis dated 04/07/2023    FINDINGS:  There are diffuse atherosclerotic changes of the abdominal aorta and its branches.  The celiac  artery is patent with mild narrowing.  The superior mesenteric artery and inferior mesenteric artery are patent.  The common iliac arteries are patent with mild narrowing bilaterally.  There is non opacification of the left internal iliac artery.  The proximal right internal iliac artery is patent.  The external iliac arteries are patent with moderate narrowing bilaterally.    The right femoral artery is patent with mild multifocal narrowing.  There is a patent stent within the distal femoral artery.  The popliteal artery is patent with mild multifocal narrowing.  There is no flow within the tibioperoneal trunk.  There is reconstitution of flow in the peroneal artery, with minimal scattered flow the posterior and anterior tibial arteries.    The left femoral artery is occluded with patent deep femoral artery.  The popliteal artery is occluded.  There is reconstitution of flow with minimal scattered flow in the peroneal and posterior tibial arteries.    There are small bilateral pleural effusions with dependent airspace consolidations.  There is no acute abnormality of the liver, gallbladder, spleen or pancreas.  The adrenal glands are difficult to visualize.  There is no hydronephrosis.  There is no bowel obstruction.  Small volume of ascites is noted.  There is diffuse anasarca  Impression: 1. Extensive atherosclerotic changes of the abdominal aorta and its branches with bilateral peripheral vascular disease.  2. Minimal scattered bilateral three-vessel runoff.    Electronically signed by: Franchesca Valdez  Date:    04/08/2023  Time:    15:49  X-Ray Chest 1 View  Narrative: EXAMINATION:  XR CHEST 1 VIEW    CLINICAL HISTORY:  PICC placement;    TECHNIQUE:  AP chest    COMPARISON:  Chest x-ray dated 04/08/2023    FINDINGS:  Right-sided PICC line has its tip over the superior vena cava.  Left chest wall pacemaker remains in place.  The heart is stable in size.  There are increasing small pleural effusions with basilar  airspace opacities.  There is no definite visible pneumothorax.  Impression: Right-sided PICC line with tip over the superior vena cava.    Electronically signed by: Franchesca Valdez  Date:    04/08/2023  Time:    13:28  US Abdomen Limited  Narrative: Technique:Limited right upper quadrant abdominal ultrasound was performed.    Comparison: CT abdomen pelvis April 7, 2023 none.    Clinical History:Cholecystitis.    Findings:There is small right pleural effusion. There is mild ascites.    Liver:The liver is enlarged and measures 20 centimeters in greatest dimension and demonstrates increased echogenicity which may reflect fatty infiltration.    Biliary ducts:The common bile duct is within normal limits at 2.4 mm in diameter.    Gallbladder:The gallbladder appears full but non-distended. The gallbladder wall measures 3.5 mm in thickness. There is no pericholecystic fluid. The sonographic [place] sign is positive. There are two tiny echogenic foci along the gallbladder wall, which measure 9 x 5 x 5 mm and 7 x 3 x 6 mm, which may reflect polyps.    Pancreas:The pancreas cannot be definitively evaluated due to obscuration by bowel gas.    Kidneys:    Right kidney:There is mild right hydronephrosis.    Dimensions:The right kidney measures 10.3 x 5.2 x 7.5 cm.    Vascular:    Portal vein:Flow parameters are within normal limits with hepatopetal flow.    IVC:The IVC is within normal limits.  Impression: Impression:    1. The gallbladder appears full but non-distended. The gallbladder wall measures 3.5 mm in thickness. The sonographic [place] sign is positive. There are two tiny echogenic foci along the gallbladder wall, which measure 9 x 5 x 5 mm and 7 x 3 x 6 mm, which may reflect polyps. Correlate with clinical and laboratory findings as regards additional evaluation and follow-up as the above findings could reflect cholecystitis although the presence of significant ascites decreases sensitivity and specificity.    2.  There is mild right hydronephrosis.    3. There is small right pleural effusion. There is mild ascites.    4. Mild hepatomegaly with diffuse fatty infiltration.    5. Details and other findings as noted above.    No significant discrepancy with overnight report.    Electronically signed by: Noel Yoon  Date:    04/08/2023  Time:    09:35  Echo  · The left ventricle is mildly enlarged with eccentric hypertrophy and   severely decreased systolic function.  · A large left ventricular thrombus is present. The thrombus is fixed and   located in the apex.  · The estimated ejection fraction is 15%.  · Grade I left ventricular diastolic dysfunction.  · There is a large irregular right atrial thrombus present. The thrombus   is mobile.  · Mobile echodensity noted on pacer lead.  · Severe left atrial enlargement.  · Mild mitral regurgitation.  · The estimated PA systolic pressure is 39 mmHg.  · Mild tricuspid regurgitation.     X-Ray Chest 1 View  Narrative: EXAMINATION:  XR CHEST 1 VIEW    CLINICAL HISTORY:  chf;    TECHNIQUE:  AP chest    COMPARISON:  Chest x-ray dated 02/23/2023    FINDINGS:  Left chest wall pacemaker is in place.  The heart is stable in size.  There is a small left pleural effusion.  There is no definite visible pneumothorax.  Impression: Small left pleural effusion.    Electronically signed by: Franchesca Valdez  Date:    04/08/2023  Time:    09:13      Alex Almeida MD   04/09/2023

## 2023-04-09 NOTE — PROGRESS NOTES
[facility] Hospital Medicine Progress Note        Chief Complaint: Inpatient Follow-up for Unwitnessed fall    HPI: This is a 67-year-old male with medical history of ischemic cardiomyopathy/HFrEF 35% per echo 2021, CAD s/p CABG, paroxysmal AFib on Eliquis, third-degree AV block  s/p dual-chamber pacemaker, PAD/multiple interventions on Plavix, T2DM, HTN, HLD present to the ED after being found by his neighbor laying on the floor naked.  Patient has no recollection of what happened but report that he fell a day prior and currently complaining of right hip and right-sided abdominal pain.  Report over the past week he was feeling extremely weak but denies focal extremity weakness or numbness.  Denies chest pain but report dyspnea on exertion.  Denies cough, fever or chills.    On arrival to ED he was afebrile, hypertensive and saturating 97% on 3 L OxyMask.  EKG showed ventricular paced rhythm.  Labs notable for WBC 12.3, hemoglobin 12.2, platelets 57, INR 2.06, sodium 146, potassium 4.3, chloride 115, CO2 21, creatinine 1.32, BUN 48, albumin 2.2, normal alkaline phosphatase, total bilirubin 4.4, , , BNP 3249, , troponin 1.266 > 1.061, , urinalysis unremarkable.    CT head showed no acute intracranial abnormalities.      CT abdomen and pelvis show evidence of anasarca with bilateral small pleural effusion, small ascites and mesenteric edema, presacral and body wall edema, cardiomegaly and filling defect in the LV apex highly suspicious for thrombus, severe aortoiliac atherosclerosis.    Pelvic x-ray showed evidence fractures.    Echo final read pending but noted for LVEF 15%, grade 1 diastolic dysfunction, large fixed LV apical thrombus present, mobile echodensity noted on RV pacer lead.     BLE venous ultrasound showed no evidence of DVT.  BLE arterial ultrasound show bilateral severe arterial flow reduction.    He was given 1 L of normal saline, vancomycin, Zosyn,  aspirin 325mg, cardiology consulted and referred to hospital medicine service for further evaluation and management.    Interval Hx:   Pt mis tender all over. Very tender over right hip but no fx on ct abd and pelvis.  Abdominal us raises possibility of acute cholecystitis. Will  consult general surgery for eval. Worsening thrombocytopenia. Also complains of sob, I elevated head of bed and increased o2 to 3 liters for O2 sat to 3liters.  Siegel placed for retention     Objective/physical exam:  General: Appears comfortable    HEENT:  Bruise over the left forehead and neck    Neck:  JVD up to ear lobe    Chest:  Bilateral rales at the bases    CVS: Regular rhythm. Normal S1/S2. +2 pedal edema    Abdomen: nondistended, normoactive BS, epigastric and right upper quadrant tenderness and voluntary guarding    MSK: No obvious deformity or joint swelling, bilateral feet feels cold unable to palpate pulses on the left    Skin: Warm and dry    Neuro: AAOx3, no focal neurological deficit    Psych: Cooperative    VITAL SIGNS: 24 HRS MIN & MAX LAST   Temp  Min: 96.1 °F (35.6 °C)  Max: 96.9 °F (36.1 °C) 96.1 °F (35.6 °C)   BP  Min: 117/70  Max: 149/85 117/70   Pulse  Min: 80  Max: 82  80   Resp  Min: 19  Max: 22 (!) 22     SpO2  Min: 95 %  Max: 100 % 97 %       Recent Labs   Lab 04/07/23  1305 04/08/23  0431   WBC 12.3* 8.7   RBC 4.18* 4.56*   HGB 12.2* 13.1*   HCT 37.9* 42.2   MCV 90.7 92.5   MCH 29.2 28.7   MCHC 32.2* 31.0*   RDW 20.7* 21.1*   PLT 57* 52*       Recent Labs   Lab 04/07/23  1305 04/08/23  0431   * 149*   K 4.3 4.1   CO2 21* 22*   BUN 48.7* 48.9*   CREATININE 1.32* 1.06   CALCIUM 8.6* 8.9   MG  --  2.10   ALBUMIN 2.2* 2.3*   ALKPHOS 92 96   * 167*   * 80*   BILITOT 4.4* 5.6*          Microbiology Results (last 7 days)       ** No results found for the last 168 hours. **             See below for Radiology    Scheduled Med:   carvediloL  12.5 mg Oral BID    furosemide (LASIX) injection  80 mg  Intravenous BID    nitroGLYCERIN 2% TD oint  1 inch Topical (Top) Q6H    piperacillin-tazobactam (ZOSYN) IVPB  4.5 g Intravenous Q8H    sacubitriL-valsartan  1 tablet Oral BID    sodium chloride 0.9%  10 mL Intravenous Q6H        Abdominal ultrasound - 1. The gallbladder appears full but non-distended. The gallbladder wall measures 3.5 mm in thickness. The sonographic [place] sign is positive. There are two tiny echogenic foci along the gallbladder wall, which measure 9 x 5 x 5 mm and 7 x 3 x 6 mm, which may reflect polyps. Correlate with clinical and laboratory findings as regards additional evaluation and follow-up as the above findings could reflect cholecystitis although the presence of significant ascites decreases sensitivity and specificity.     2. There is mild right hydronephrosis.     3. There is small right pleural effusion. There is mild ascites.     4. Mild hepatomegaly with diffuse fatty infiltration.     5. Details and other findings as noted above.     No significant discrepancy with overnight report.       PRN Meds:  acetaminophen, acetaminophen, aluminum-magnesium hydroxide-simethicone, cloNIDine, dextrose 10%, dextrose 10%, glucagon (human recombinant), hydrALAZINE, insulin aspart U-100, melatonin, ondansetron, polyethylene glycol, prochlorperazine, senna-docusate 8.6-50 mg, sodium chloride 0.9%, Flushing PICC Protocol **AND** sodium chloride 0.9% **AND** sodium chloride 0.9%       Assessment/Plan:    Unwitnessed fall/syncope  Echo / carotid us done.  ICMO w ef 15% and LV thrombus    Acute on chronic systolic and diastolic heart failure      LV apical thrombus (while on Eliquis)      NSTEMI unknown type      Congestive hepatopathy versus acute cholecystitis  - will get input from surgery       Acute kidney injury-resolved       Acute thrombocytopenia -etiology possibly ?cardiac/LYNCH cirrhosis or infection/sepsis  - worsening       Hypernatremia/hyperchloremia      small right pleural effusion.         mild ascites.      Severe lower extremity PAD             HX Severe BLE PAD, HTN, HLD, CHB s/p PPM, paroxysmal AFib on Eliquis, T2DM     mild right hydronephrosis./urinary retention - limon     Plan:     Consult general surgery  for suspected cholecystitis vs passive congestion from heart failure    Telemetry monitoring      Continue IV Zosyn to cover possible cholecystitis      Lasix 80 mg IV Q12H, accurate I/O, daily weight, Keep K>4, Mg>2      Continue carvedilol and Entresto      Hold statin at this time due to elevated transaminases      Given worsening  thrombocytopenia and bleeding risk will hold anticoagulation and will monitor platelets trend if remains stable/above 60 will restart Plavix and can start heparin drip and monitor platelets/bleeding.  Given he already developed LV thrombus while on Eliquis could consider switching to Xarelto or warfarin - will defer to Cardiology.      Check peripheral smear, fibrinogen, LDH, haptoglobin, B12, hepatitis panel      SSI Q6H, check A1c      Critical care time:  45 minutes    Critical care diagnosis:  Acute on chronic HFrEF    VTE prophylaxis: none for now    Patient condition:  Guarded    Anticipated discharge and Disposition:   tbd      All diagnosis and differential diagnosis have been reviewed; assessment and plan has been documented; I have personally reviewed the labs and test results that are presently available; I have reviewed the patients medication list; I have reviewed the consulting providers response and recommendations. I have reviewed or attempted to review medical records based upon their availability    All of the patient's questions have been  addressed and answered. Patient's is agreeable to the above stated plan. I will continue to monitor closely and make adjustments to medical management as needed.  _____________________________________________________________________    Nutrition Status:    Radiology:  CTA Runoff ABD PEL Bilat Lower  Ext  Narrative: EXAMINATION:  CTA RUNOFF ABD PEL BILAT LOWER EXT    CLINICAL HISTORY:  Claudication or leg ischemia;pad; cta aorta with runoff;    TECHNIQUE:  CT images of the abdomen, pelvis, and lower extremities before and after IV contrast. Axial, coronal and sagittal reformatted images are reviewed. 3D reconstructed images were also performed for further evaluation of the vasculature. Dose length product is 2453 mGycm. Automatic exposure control, adjustment of mA/kV or iterative reconstruction technique was used to limit radiation dose.    COMPARISON:  CT abdomen pelvis dated 04/07/2023    FINDINGS:  There are diffuse atherosclerotic changes of the abdominal aorta and its branches.  The celiac artery is patent with mild narrowing.  The superior mesenteric artery and inferior mesenteric artery are patent.  The common iliac arteries are patent with mild narrowing bilaterally.  There is non opacification of the left internal iliac artery.  The proximal right internal iliac artery is patent.  The external iliac arteries are patent with moderate narrowing bilaterally.    The right femoral artery is patent with mild multifocal narrowing.  There is a patent stent within the distal femoral artery.  The popliteal artery is patent with mild multifocal narrowing.  There is no flow within the tibioperoneal trunk.  There is reconstitution of flow in the peroneal artery, with minimal scattered flow the posterior and anterior tibial arteries.    The left femoral artery is occluded with patent deep femoral artery.  The popliteal artery is occluded.  There is reconstitution of flow with minimal scattered flow in the peroneal and posterior tibial arteries.    There are small bilateral pleural effusions with dependent airspace consolidations.  There is no acute abnormality of the liver, gallbladder, spleen or pancreas.  The adrenal glands are difficult to visualize.  There is no hydronephrosis.  There is no bowel obstruction.   Small volume of ascites is noted.  There is diffuse anasarca  Impression: 1. Extensive atherosclerotic changes of the abdominal aorta and its branches with bilateral peripheral vascular disease.  2. Minimal scattered bilateral three-vessel runoff.    Electronically signed by: Franchesca Valdez  Date:    04/08/2023  Time:    15:49  X-Ray Chest 1 View  Narrative: EXAMINATION:  XR CHEST 1 VIEW    CLINICAL HISTORY:  PICC placement;    TECHNIQUE:  AP chest    COMPARISON:  Chest x-ray dated 04/08/2023    FINDINGS:  Right-sided PICC line has its tip over the superior vena cava.  Left chest wall pacemaker remains in place.  The heart is stable in size.  There are increasing small pleural effusions with basilar airspace opacities.  There is no definite visible pneumothorax.  Impression: Right-sided PICC line with tip over the superior vena cava.    Electronically signed by: Franchesca Valdez  Date:    04/08/2023  Time:    13:28  US Abdomen Limited  Narrative: Technique:Limited right upper quadrant abdominal ultrasound was performed.    Comparison: CT abdomen pelvis April 7, 2023 none.    Clinical History:Cholecystitis.    Findings:There is small right pleural effusion. There is mild ascites.    Liver:The liver is enlarged and measures 20 centimeters in greatest dimension and demonstrates increased echogenicity which may reflect fatty infiltration.    Biliary ducts:The common bile duct is within normal limits at 2.4 mm in diameter.    Gallbladder:The gallbladder appears full but non-distended. The gallbladder wall measures 3.5 mm in thickness. There is no pericholecystic fluid. The sonographic [place] sign is positive. There are two tiny echogenic foci along the gallbladder wall, which measure 9 x 5 x 5 mm and 7 x 3 x 6 mm, which may reflect polyps.    Pancreas:The pancreas cannot be definitively evaluated due to obscuration by bowel gas.    Kidneys:    Right kidney:There is mild right hydronephrosis.    Dimensions:The right  kidney measures 10.3 x 5.2 x 7.5 cm.    Vascular:    Portal vein:Flow parameters are within normal limits with hepatopetal flow.    IVC:The IVC is within normal limits.  Impression: Impression:    1. The gallbladder appears full but non-distended. The gallbladder wall measures 3.5 mm in thickness. The sonographic [place] sign is positive. There are two tiny echogenic foci along the gallbladder wall, which measure 9 x 5 x 5 mm and 7 x 3 x 6 mm, which may reflect polyps. Correlate with clinical and laboratory findings as regards additional evaluation and follow-up as the above findings could reflect cholecystitis although the presence of significant ascites decreases sensitivity and specificity.    2. There is mild right hydronephrosis.    3. There is small right pleural effusion. There is mild ascites.    4. Mild hepatomegaly with diffuse fatty infiltration.    5. Details and other findings as noted above.    No significant discrepancy with overnight report.    Electronically signed by: Noel Yoon  Date:    04/08/2023  Time:    09:35  Echo  · The left ventricle is mildly enlarged with eccentric hypertrophy and   severely decreased systolic function.  · A large left ventricular thrombus is present. The thrombus is fixed and   located in the apex.  · The estimated ejection fraction is 15%.  · Grade I left ventricular diastolic dysfunction.  · There is a large irregular right atrial thrombus present. The thrombus   is mobile.  · Mobile echodensity noted on pacer lead.  · Severe left atrial enlargement.  · Mild mitral regurgitation.  · The estimated PA systolic pressure is 39 mmHg.  · Mild tricuspid regurgitation.     X-Ray Chest 1 View  Narrative: EXAMINATION:  XR CHEST 1 VIEW    CLINICAL HISTORY:  chf;    TECHNIQUE:  AP chest    COMPARISON:  Chest x-ray dated 02/23/2023    FINDINGS:  Left chest wall pacemaker is in place.  The heart is stable in size.  There is a small left pleural effusion.  There is no definite  visible pneumothorax.  Impression: Small left pleural effusion.    Electronically signed by: Franchesca Valdez  Date:    04/08/2023  Time:    09:13      Alex Almeida MD   04/08/2023

## 2023-04-09 NOTE — PROGRESS NOTES
[facility] - [facility] Medical Telemetry  Cardiology  Progress Note    Patient Name: Darren Patel  MRN: [id number]  Admission Date: 4/7/2023  Hospital Length of Stay: 2 days  Code Status: Full Code   Attending Provider: Nate Robledo MD   Consulting Provider: ZEYNEP Cast  Primary Care Physician: ZEYNEP Rosales  Principal Problem:Acute on chronic HFrEF (heart failure with reduced ejection fraction)    Patient information was obtained from past medical records and ER records.     Subjective:     Chief Complaint:  AMS    HPI:   Mr. Patel is  a 66y/o male, known to [facility] cardiology, with PMHx significant for ICMO, PAfib, HLD, CHB, HTN, CAD, T2DM, and PAD who presented to ED via EMS after being found naked on the floor. Patient reporting falling the day prior and unable to get up. He has c/o generalized weakness, right hip and right sided abdominal pain. Denies CP or SOB. CT head negative. CT abdomen/pelvis revealing anasarca with small bilateral pleural effusions, small ascites, cardiomegaly, filling defect in LV apex highly suspicious for thrombus and severe aortoiliac atherosclerosis. Echo revealing EF 15%, grade I LVDD, large fixed LV apical thrombus and mobile echodensity on RV pacer lead. BLE Venous US negative DVT. BLE arterial Us consistent with bilateral severe arterial flow reduction. Labs significant for SBC 12.3, H/H 12.2/37.9, Plt 57, INR 2.06, Na 146, Bun/creatinine 48.7/1.32, AST//176, BNP 3249, , Troponin 1.266-1.061-1.289. He was treated with 1L NS, IV ABX and admitted to hospitalist. [facility] has been consulted for NSTEMI.   **call by nurse this am regarding pale cool LLE with absent infrapopliteal pulses    PMH: ICMO, PAfib, HLD, CHB, HTN, CAD, T2DM,   PSH: LHC, Peripheral angiogram. 3V CABG '21  Family History:   Social History: current smoker; hx cocaine use    Previous Cardiac Diagnostics:   TTE 04.07.23:  The left ventricle is mildly enlarged with eccentric  hypertrophy and severely decreased systolic function. The estimated ejection fraction is 15%. Grade I left ventricular diastolic dysfunction. Severe left atrial enlargement. Mild mitral regurgitation. Elevated central venous pressure (15 mmHg). The estimated PA systolic pressure is 39 mmHg. Mobile echodensity noted on pacer lead. A large left ventricular thrombus is present. The thrombus is fixed and located in the apex. Mild tricuspid regurgitation.    Venous US BLE 04.07.23:  There was no evidence of deep or superficial vein thrombosis in the bilateral lower extremities.    Arterial US BLE 04.07.23:  The right lower extremity demonstrated severe arterial flow reduction consistent with inflow disease and monophasic waveforms throughout.    Trickle flow was seen in the posterior tibial artery of the right lower extremity.    The left lower extremity demonstrated severe arterial flow reduction primarily in the popliteal and tibial arteries consistent with monophasic waveforms.      Right Peripheral angiogram 5/20/22:  Occlusion of distal popliteal artery, anterior tibial artery and severe stenosis in Posterior tibial and peroneal artery    Right peripheral angiogram 05/05/2022:  Stenting of right SFA, right profundus, and right external iliac artery    Past Medical History:   Diagnosis Date    Diabetes mellitus     High cholesterol     Hypertension        Past Surgical History:   Procedure Laterality Date    A-V CARDIAC PACEMAKER INSERTION Left 11/9/2020    Procedure: INSERTION, CARDIAC PACEMAKER, DUAL CHAMBER;  Surgeon: Nolan Hernandez MD;  Location: [facility] CATH LAB;  Service: Cardiology;  Laterality: Left;    CORONARY ANGIOGRAPHY N/A 11/9/2020    Procedure: ANGIOGRAM, CORONARY ARTERY - right radial;  Surgeon: Nolan Hernandez MD;  Location: [facility] CATH LAB;  Service: Cardiology;  Laterality: N/A;       Review of patient's allergies indicates:  No Known Allergies    No current facility-administered medications on file  prior to encounter.     Current Outpatient Medications on File Prior to Encounter   Medication Sig    apixaban (ELIQUIS) 5 mg Tab Take 5 mg by mouth.    aspirin (ECOTRIN) 81 MG EC tablet Take 1 tablet (81 mg total) by mouth once daily.    atorvastatin (LIPITOR) 80 MG tablet Take 80 mg by mouth.    carvediloL (COREG) 12.5 MG tablet Take 12.5 mg by mouth 2 (two) times daily.    clopidogreL (PLAVIX) 75 mg tablet Take 75 mg by mouth.    famotidine (PEPCID) 20 MG tablet Take 20 mg by mouth.    losartan (COZAAR) 50 MG tablet Take 1 tablet by mouth once daily.    metFORMIN (GLUCOPHAGE) 1000 MG tablet Take 1,000 mg by mouth.    mupirocin (BACTROBAN) 2 % ointment Apply to nostrils twice daily    nitroGLYCERIN (NITROSTAT) 0.4 MG SL tablet DISSOLVE 1 TABLET UNDER THE TONGUE EVERY 5 MINUTES AS NEEDED FOR CHEST PAIN FOR UP TO 3 DOSES. IF NO RELIEF, SEEK MEDICAL ATTENTION    sacubitriL-valsartan (ENTRESTO) 24-26 mg per tablet Take by mouth.     Family History       Family history is unknown by patient.          Tobacco Use    Smoking status: Some Days     Packs/day: 1.50     Types: Cigarettes    Smokeless tobacco: Never   Substance and Sexual Activity    Alcohol use: Never     Comment: occasionally    Drug use: Never    Sexual activity: Not on file       Review of Systems   Unable to perform ROS: Mental status change   Respiratory:  Negative for shortness of breath.    Musculoskeletal:         LLE pain   Psychiatric/Behavioral:  Positive for confusion.      Objective:     Vital Signs (Most Recent):  Temp: 99.7 °F (37.6 °C) (04/09/23 0725)  Pulse: 80 (04/09/23 0725)  Resp: (!) 22 (04/09/23 0725)  BP: 125/68 (04/09/23 0725)  SpO2: 97 % (04/09/23 0725) Vital Signs (24h Range):  Temp:  [96.1 °F (35.6 °C)-99.7 °F (37.6 °C)] 99.7 °F (37.6 °C)  Pulse:  [79-80] 80  Resp:  [18-22] 22  SpO2:  [95 %-97 %] 97 %  BP: (111-125)/(57-74) 125/68     Weight: 85 kg (187 lb 6.3 oz)  Body mass index is 25.41 kg/m².    SpO2: 97 %         Intake/Output  Summary (Last 24 hours) at 4/9/2023 1006  Last data filed at 4/9/2023 0630  Gross per 24 hour   Intake --   Output 4000 ml   Net -4000 ml       Lines/Drains/Airways       Peripherally Inserted Central Catheter Line  Duration             PICC Double Lumen 04/08/23 1236 right basilic <1 day                    Significant Labs:  Recent Results (from the past 72 hour(s))   Comprehensive metabolic panel    Collection Time: 04/07/23  1:05 PM   Result Value Ref Range    Sodium Level 146 (H) 136 - 145 mmol/L    Potassium Level 4.3 3.5 - 5.1 mmol/L    Chloride 115 (H) 98 - 107 mmol/L    Carbon Dioxide 21 (L) 23 - 31 mmol/L    Glucose Level 182 (H) 82 - 115 mg/dL    Blood Urea Nitrogen 48.7 (H) 8.4 - 25.7 mg/dL    Creatinine 1.32 (H) 0.73 - 1.18 mg/dL    Calcium Level Total 8.6 (L) 8.8 - 10.0 mg/dL    Protein Total 5.0 (L) 5.8 - 7.6 gm/dL    Albumin Level 2.2 (L) 3.4 - 4.8 g/dL    Globulin 2.8 2.4 - 3.5 gm/dL    Albumin/Globulin Ratio 0.8 (L) 1.1 - 2.0 ratio    Bilirubin Total 4.4 (H) <=1.5 mg/dL    Alkaline Phosphatase 92 40 - 150 unit/L    Alanine Aminotransferase 176 (H) 0 - 55 unit/L    Aspartate Aminotransferase 100 (H) 5 - 34 unit/L    eGFR 58 mls/min/1.73/m2   Troponin I    Collection Time: 04/07/23  1:05 PM   Result Value Ref Range    Troponin-I 1.266 (H) 0.000 - 0.045 ng/mL   APTT    Collection Time: 04/07/23  1:05 PM   Result Value Ref Range    PTT 37.0 (H) 23.2 - 33.7 seconds   Protime-INR    Collection Time: 04/07/23  1:05 PM   Result Value Ref Range    PT 22.8 (H) 12.5 - 14.5 seconds    INR 2.06 (H) 0.00 - 1.30   CPK    Collection Time: 04/07/23  1:05 PM   Result Value Ref Range    Creatine Kinase 608 (H) 30 - 200 U/L   CBC with Differential    Collection Time: 04/07/23  1:05 PM   Result Value Ref Range    WBC 12.3 (H) 4.5 - 11.5 x10(3)/mcL    RBC 4.18 (L) 4.70 - 6.10 x10(6)/mcL    Hgb 12.2 (L) 14.0 - 18.0 g/dL    Hct 37.9 (L) 42.0 - 52.0 %    MCV 90.7 80.0 - 94.0 fL    MCH 29.2 27.0 - 31.0 pg    MCHC 32.2 (L)  33.0 - 36.0 g/dL    RDW 20.7 (H) 11.5 - 17.0 %    Platelet 57 (L) 130 - 400 x10(3)/mcL    MPV      Neut % 86.1 %    Lymph % 4.7 %    Mono % 8.8 %    Eos % 0.0 %    Basophil % 0.2 %    Lymph # 0.58 (L) 0.6 - 4.6 x10(3)/mcL    Neut # 10.58 (H) 2.1 - 9.2 x10(3)/mcL    Mono # 1.08 0.1 - 1.3 x10(3)/mcL    Eos # 0.00 0 - 0.9 x10(3)/mcL    Baso # 0.02 0 - 0.2 x10(3)/mcL    IG# 0.03 0 - 0.04 x10(3)/mcL    IG% 0.2 %    NRBC% 0.7 %   Lipase    Collection Time: 04/07/23  1:05 PM   Result Value Ref Range    Lipase Level 27 <=60 U/L   Echo    Collection Time: 04/07/23  3:11 PM   Result Value Ref Range    BSA 2.17 m2    TDI SEPTAL 0.05 m/s    LV LATERAL E/E' RATIO 12.00 m/s    LV SEPTAL E/E' RATIO 14.40 m/s    Right Atrial Pressure (from IVC) 15 mmHg    EF 15 %    Left Ventricular Outflow Tract Mean Velocity 0.49 cm/s    Left Ventricular Outflow Tract Mean Gradient 1.00 mmHg    TDI LATERAL 0.06 m/s    LVIDd 6.47 (A) 3.5 - 6.0 cm    IVS 1.11 (A) 0.6 - 1.1 cm    Posterior Wall 0.92 0.6 - 1.1 cm    LVIDs 6.07 (A) 2.1 - 4.0 cm    FS 6 28 - 44 %    Sinus 4.00 cm    LV mass 286.21 g    LA size 5.60 cm    Left Ventricle Relative Wall Thickness 0.28 cm    AV mean gradient 3 mmHg    AV valve area 1.71 cm2    AV Velocity Ratio 0.68     AV index (prosthetic) 0.49     MV mean gradient 2 mmHg    MV valve area p 1/2 method 3.33 cm2    MV valve area by continuity eq 1.90 cm2    E/A ratio 1.53     Mean e' 0.06 m/s    E wave deceleration time 140.00 msec    LVOT diameter 2.10 cm    LVOT area 3.5 cm2    LVOT peak mateo 0.80 m/s    LVOT peak VTI 11.40 cm    Ao peak mateo 1.17 m/s    Ao VTI 23.1 cm    LVOT stroke volume 39.47 cm3    AV peak gradient 5 mmHg    MV peak gradient 4 mmHg    TV rest pulmonary artery pressure 39 mmHg    E/E' ratio 13.09 m/s    MV Peak E Mateo 0.72 m/s    TR Max Mateo 2.47 m/s    MV VTI 20.8 cm    MV stenosis pressure 1/2 time 66.00 ms    MV Peak A Mateo 0.47 m/s    LV Systolic Volume 185.00 mL    LV Systolic Volume Index 86.0 mL/m2     LV Diastolic Volume 214.00 mL    LV Diastolic Volume Index 99.53 mL/m2    LV Mass Index 133 g/m2    Triscuspid Valve Regurgitation Peak Gradient 24 mmHg    LA Volume Index (Mod) 47.9 mL/m2    LA volume (mod) 103.00 cm3   Troponin I    Collection Time: 04/07/23  6:58 PM   Result Value Ref Range    Troponin-I 1.061 (H) 0.000 - 0.045 ng/mL   BNP    Collection Time: 04/07/23 10:21 PM   Result Value Ref Range    Natriuretic Peptide 3,249.1 (H) <=100.0 pg/mL   Lactate Dehydrogenase    Collection Time: 04/07/23 10:21 PM   Result Value Ref Range    Lactate Dehydrogenase 475 (H) 125 - 220 U/L   Haptoglobin    Collection Time: 04/07/23 10:21 PM   Result Value Ref Range    Haptoglobin 77 40 - 368 mg/dL   Path Review, Peripheral Smear    Collection Time: 04/07/23 10:21 PM   Result Value Ref Range    Peripheral Smear Evaluation       - Normocytic, normochromic anemia with appropriate polychromasia and no schistocytes.  - Thrombocytopenia.    Impression: Normocytic anemia can be seen in early iron deficiency anemia, anemia of chronic disease and acute blood loss. Thrombocytopenia can be due to decreased production, increased destruction (immune and non-immune mediated) or due to splenic sequestration.    Francisco Malave M.D.      Fibrinogen    Collection Time: 04/07/23 10:21 PM   Result Value Ref Range    Fibrinogen 194.0 (L) 210.0 - 463.0 mg/dL   Troponin I    Collection Time: 04/07/23 10:21 PM   Result Value Ref Range    Troponin-I 1.289 (H) 0.000 - 0.045 ng/mL   Urinalysis, Reflex to Urine Culture    Collection Time: 04/08/23  3:52 AM    Specimen: Urine   Result Value Ref Range    Color, UA Yellow Yellow, Light-Yellow, Dark Yellow, Aletha, Straw    Appearance, UA Clear Clear    Specific Gravity, UA 1.008 1.005 - 1.030    pH, UA 5.0 5.0 - 8.5    Protein, UA Negative Negative mg/dL    Glucose, UA Negative Negative, Normal mg/dL    Ketones, UA Negative Negative mg/dL    Blood, UA Negative Negative unit/L    Bilirubin, UA  Negative Negative mg/dL    Urobilinogen, UA 0.2 0.2, 1.0, Normal mg/dL    Nitrites, UA Negative Negative    Leukocyte Esterase, UA Trace (A) Negative unit/L   Protein/Creatinine Ratio, Urine    Collection Time: 04/08/23  3:52 AM   Result Value Ref Range    Urine Protein Level <6.8 mg/dL    Urine Creatinine 5.7 (L) 63.0 - 166.0 mg/dL   Sodium, Random Urine    Collection Time: 04/08/23  3:52 AM   Result Value Ref Range    Urine Sodium 106.0 mmol/L   Urea Nitrogen, Random Urine    Collection Time: 04/08/23  3:52 AM   Result Value Ref Range    Urine Urea Nitrogen 128.0 mg/dL   Urinalysis, Microscopic    Collection Time: 04/08/23  3:52 AM   Result Value Ref Range    RBC, UA <5 <=5 /HPF    WBC, UA <5 <=5 /HPF    Squamous Epithelial Cells, UA <5 <=5 /HPF    Bacteria, UA None Seen None Seen, Rare, Occasional /HPF   Comprehensive Metabolic Panel    Collection Time: 04/08/23  4:31 AM   Result Value Ref Range    Sodium Level 149 (H) 136 - 145 mmol/L    Potassium Level 4.1 3.5 - 5.1 mmol/L    Chloride 114 (H) 98 - 107 mmol/L    Carbon Dioxide 22 (L) 23 - 31 mmol/L    Glucose Level 150 (H) 82 - 115 mg/dL    Blood Urea Nitrogen 48.9 (H) 8.4 - 25.7 mg/dL    Creatinine 1.06 0.73 - 1.18 mg/dL    Calcium Level Total 8.9 8.8 - 10.0 mg/dL    Protein Total 5.2 (L) 5.8 - 7.6 gm/dL    Albumin Level 2.3 (L) 3.4 - 4.8 g/dL    Globulin 2.9 2.4 - 3.5 gm/dL    Albumin/Globulin Ratio 0.8 (L) 1.1 - 2.0 ratio    Bilirubin Total 5.6 (H) <=1.5 mg/dL    Alkaline Phosphatase 96 40 - 150 unit/L    Alanine Aminotransferase 167 (H) 0 - 55 unit/L    Aspartate Aminotransferase 80 (H) 5 - 34 unit/L    eGFR >60 mls/min/1.73/m2   Magnesium    Collection Time: 04/08/23  4:31 AM   Result Value Ref Range    Magnesium Level 2.10 1.60 - 2.60 mg/dL   Phosphorus    Collection Time: 04/08/23  4:31 AM   Result Value Ref Range    Phosphorus Level 4.0 2.3 - 4.7 mg/dL   Hemoglobin A1c if not done in past 3 months    Collection Time: 04/08/23  4:31 AM   Result Value Ref  Range    Hemoglobin A1c 10.7 (H) <=7.0 %    Estimated Average Glucose 260.4 mg/dL   CBC with Differential    Collection Time: 04/08/23  4:31 AM   Result Value Ref Range    WBC 8.7 4.5 - 11.5 x10(3)/mcL    RBC 4.56 (L) 4.70 - 6.10 x10(6)/mcL    Hgb 13.1 (L) 14.0 - 18.0 g/dL    Hct 42.2 42.0 - 52.0 %    MCV 92.5 80.0 - 94.0 fL    MCH 28.7 27.0 - 31.0 pg    MCHC 31.0 (L) 33.0 - 36.0 g/dL    RDW 21.1 (H) 11.5 - 17.0 %    Platelet 52 (L) 130 - 400 x10(3)/mcL    MPV      NRBC% 1.4 %   Manual Differential    Collection Time: 04/08/23  4:31 AM   Result Value Ref Range    Neut Man 97 %    Monocyte Man 1 %    [place] Man 1 %    Myelo Man 2 %    Instr WBC 8.7 x10(3)/mcL    Abs Mono 0.087 (L) 0.1 - 1.3 x10(3)/mcL    Abs Neut 8.7 2.1 - 9.2 x10(3)/mcL    NRBC Man 5 %    RBC Morph Abnormal (A) Normal    Anisocyte 1+ (A) (none)    Poik 3+ (A) (none)    Macrocyte 2+ (A) (none)    Target Cell 1+ (A) (none)    Marilee Cells 2+ (A) (none)    Platelet Est Decreased (A) Normal, Adequate   Bilirubin, Direct    Collection Time: 04/08/23  4:31 AM   Result Value Ref Range    Bilirubin Direct 3.4 (H) 0.0 - <0.5 mg/dL   Vitamin B12    Collection Time: 04/08/23  4:31 AM   Result Value Ref Range    Vitamin B12 Level >2,000 (H) 213 - 816 pg/mL   Drug Screen, Urine    Collection Time: 04/08/23  2:11 PM   Result Value Ref Range    Amphetamines, Urine Negative Negative    Barbituates, Urine Negative Negative    Benzodiazepine, Urine Negative Negative    Cannabinoids, Urine Negative Negative    Cocaine, Urine Negative Negative    Fentanyl, Urine Negative Negative    MDMA, Urine Negative Negative    Opiates, Urine Negative Negative    Phencyclidine, Urine Negative Negative    pH, Urine 5.5 3.0 - 11.0   POCT glucose    Collection Time: 04/09/23  4:59 AM   Result Value Ref Range    POCT Glucose 130 (H) 70 - 110 mg/dL   Comprehensive Metabolic Panel    Collection Time: 04/09/23  5:19 AM   Result Value Ref Range    Sodium Level 154 (H) 136 - 145 mmol/L     Potassium Level 2.8 (L) 3.5 - 5.1 mmol/L    Chloride 109 (H) 98 - 107 mmol/L    Carbon Dioxide 31 23 - 31 mmol/L    Glucose Level 132 (H) 82 - 115 mg/dL    Blood Urea Nitrogen 43.2 (H) 8.4 - 25.7 mg/dL    Creatinine 1.17 0.73 - 1.18 mg/dL    Calcium Level Total 8.4 (L) 8.8 - 10.0 mg/dL    Protein Total 4.9 (L) 5.8 - 7.6 gm/dL    Albumin Level 2.1 (L) 3.4 - 4.8 g/dL    Globulin 2.8 2.4 - 3.5 gm/dL    Albumin/Globulin Ratio 0.8 (L) 1.1 - 2.0 ratio    Bilirubin Total 6.5 (H) <=1.5 mg/dL    Alkaline Phosphatase 91 40 - 150 unit/L    Alanine Aminotransferase 128 (H) 0 - 55 unit/L    Aspartate Aminotransferase 73 (H) 5 - 34 unit/L    eGFR >60 mls/min/1.73/m2   Magnesium    Collection Time: 04/09/23  5:19 AM   Result Value Ref Range    Magnesium Level 1.90 1.60 - 2.60 mg/dL   CBC with Differential    Collection Time: 04/09/23  5:19 AM   Result Value Ref Range    WBC 10.5 4.5 - 11.5 x10(3)/mcL    RBC 4.07 (L) 4.70 - 6.10 x10(6)/mcL    Hgb 11.4 (L) 14.0 - 18.0 g/dL    Hct 35.4 (L) 42.0 - 52.0 %    MCV 87.0 80.0 - 94.0 fL    MCH 28.0 27.0 - 31.0 pg    MCHC 32.2 (L) 33.0 - 36.0 g/dL    RDW 21.2 (H) 11.5 - 17.0 %    Platelet 40 (L) 130 - 400 x10(3)/mcL    MPV      NRBC% 1.2 %   Manual Differential    Collection Time: 04/09/23  5:19 AM   Result Value Ref Range    Neut Man 91 %    Lymph Man 4 %    Monocyte Man 2 %    [place] Man 3 %    Instr WBC 10.5 x10(3)/mcL    Abs Mono 0.21 0.1 - 1.3 x10(3)/mcL    Abs Lymp 0.42 (L) 0.6 - 4.6 x10(3)/mcL    Abs Neut 9.87 (H) 2.1 - 9.2 x10(3)/mcL    NRBC Man 4 %    Polychrom 1+ (A) (none)    RBC Morph Abnormal (A) Normal    Anisocyte 2+ (A) (none)    Poik 1+ (A) (none)    Macrocyte 1+ (A) (none)    Target Cell 2+ (A) (none)    Marilee Cells 1+ (A) (none)    Platelet Est Decreased (A) Normal, Adequate    Giant Platelets 1+    CV Ultrasound Bilateral Doppler Carotid    Collection Time: 04/09/23  9:32 AM   Result Value Ref Range    Right ICA/CCA ratio 0.53     Left Highest ICA 0.00     Left Highest CCA 0      Right Highest ICA 66.00     Right Highest      Right Highest EDV 17.00     Right CCA prox sys 114 cm/s    Right CCA dist sys 124 cm/s    Right ICA prox sys 66 cm/s    Right ICA prox major 16 cm/s    Right ICA mid sys 42 cm/s    Right ICA mid major 12 cm/s    Right ICA dist sys 48 cm/s    Right ICA dist major 17 cm/s    Right ECA sys 151 cm/s    Right vertebral sys 34 cm/s    Left CCA prox sys 0 cm/s    Left CCA dist sys 0 cm/s    Left ICA prox sys 0 cm/s    Left ICA mid sys 0 cm/s    Left ICA dist sys 0 cm/s    Left ECA sys 0 cm/s    Left vertebral sys 106 cm/s       Significant Imaging:  Imaging Results              US Abdomen Limited (Final result)  Result time 04/08/23 09:35:53      Final result by Noel Yoon MD (04/08/23 09:35:53)                   Impression:    Impression:    1. The gallbladder appears full but non-distended. The gallbladder wall measures 3.5 mm in thickness. The sonographic Sunflower sign is positive. There are two tiny echogenic foci along the gallbladder wall, which measure 9 x 5 x 5 mm and 7 x 3 x 6 mm, which may reflect polyps. Correlate with clinical and laboratory findings as regards additional evaluation and follow-up as the above findings could reflect cholecystitis although the presence of significant ascites decreases sensitivity and specificity.    2. There is mild right hydronephrosis.    3. There is small right pleural effusion. There is mild ascites.    4. Mild hepatomegaly with diffuse fatty infiltration.    5. Details and other findings as noted above.    No significant discrepancy with overnight report.      Electronically signed by: Noel Yoon  Date:    04/08/2023  Time:    09:35               Narrative:      Technique:Limited right upper quadrant abdominal ultrasound was performed.    Comparison: CT abdomen pelvis April 7, 2023 none.    Clinical History:Cholecystitis.    Findings:There is small right pleural effusion. There is mild ascites.    Liver:The liver  is enlarged and measures 20 centimeters in greatest dimension and demonstrates increased echogenicity which may reflect fatty infiltration.    Biliary ducts:The common bile duct is within normal limits at 2.4 mm in diameter.    Gallbladder:The gallbladder appears full but non-distended. The gallbladder wall measures 3.5 mm in thickness. There is no pericholecystic fluid. The sonographic Walker sign is positive. There are two tiny echogenic foci along the gallbladder wall, which measure 9 x 5 x 5 mm and 7 x 3 x 6 mm, which may reflect polyps.    Pancreas:The pancreas cannot be definitively evaluated due to obscuration by bowel gas.    Kidneys:    Right kidney:There is mild right hydronephrosis.    Dimensions:The right kidney measures 10.3 x 5.2 x 7.5 cm.    Vascular:    Portal vein:Flow parameters are within normal limits with hepatopetal flow.    IVC:The IVC is within normal limits.                        Preliminary result by Noel Yoon MD (04/07/23 23:06:34)                   Narrative:    START OF REPORT:  Technique: Limited right upper quadrant abdominal ultrasound was performed.    Comparison: None.    Clinical History: Cholecystitis.    Findings: There is small right pleural effusion. There is mild ascites.  Liver: The liver is mildly enlarged and measures 20 centimeters in greatest dimension and demonstrates increased echogenicity which may reflect fatty infiltration.  Biliary ducts: The common bile duct is within normal limits at 2.4 mm in diameter.  Gallbladder: The gallbladder appears full but non-distended. The gallbladder wall measures 3.5 mm in thickness. There is no pericholecystic fluid. The sonographic Walker sign is positive. There are two tiny echogenic foci along the gallbladder wall, which measure 9 x 5 x 5 mm and 7 x 3 x 6 mm, which may reflect polyps.  Pancreas: The pancreas cannot be definitively evaluated due to obscuration by bowel gas.  Kidneys:  Right kidney: There is mild right  hydronephrosis.  Dimensions: The right kidney measures 10.3 x 5.2 x 7.5 cm.  Vascular:  Portal vein: Flow parameters are within normal limits with hepatopetal flow.  IVC: The IVC is within normal limits.      Impression:  1. The gallbladder appears full but non-distended. The gallbladder wall measures 3.5 mm in thickness. The sonographic Concord sign is positive. There are two tiny echogenic foci along the gallbladder wall, which measure 9 x 5 x 5 mm and 7 x 3 x 6 mm, which may reflect polyps. Correlate with clinical and laboratory findings as regards additional evaluation and follow-up as the above findings could reflect cholecystitis although the presence of significant ascites decreases sensitivity and specificity.  2. There is mild right hydronephrosis.  3. There is small right pleural effusion. There is mild ascites.  4. Mild hepatomegaly with diffuse fatty infiltration.  5. Details and other findings as noted above.                          Preliminary result by David Tomas MD (04/07/23 23:06:34)                   Narrative:    START OF REPORT:  Technique: Limited right upper quadrant abdominal ultrasound was performed.    Comparison: None.    Clinical History: Cholecystitis.    Findings: There is small right pleural effusion. There is mild ascites.  Liver: The liver is mildly enlarged and measures 20 centimeters in greatest dimension and demonstrates increased echogenicity which may reflect fatty infiltration.  Biliary ducts: The common bile duct is within normal limits at 2.4 mm in diameter.  Gallbladder: The gallbladder appears full but non-distended. The gallbladder wall measures 3.5 mm in thickness. There is no pericholecystic fluid. The sonographic Concord sign is positive. There are two tiny echogenic foci along the gallbladder wall, which measure 9 x 5 x 5 mm and 7 x 3 x 6 mm, which may reflect polyps.  Pancreas: The pancreas cannot be definitively evaluated due to obscuration by bowel  gas.  Kidneys:  Right kidney: There is mild right hydronephrosis.  Dimensions: The right kidney measures 10.3 x 5.2 x 7.5 cm.  Vascular:  Portal vein: Flow parameters are within normal limits with hepatopetal flow.  IVC: The IVC is within normal limits.      Impression:  1. The gallbladder appears full but non-distended. The gallbladder wall measures 3.5 mm in thickness. The sonographic [place] sign is positive. There are two tiny echogenic foci along the gallbladder wall, which measure 9 x 5 x 5 mm and 7 x 3 x 6 mm, which may reflect polyps. Correlate with clinical and laboratory findings as regards additional evaluation and follow-up as the above findings could reflect cholecystitis although the presence of significant ascites decreases sensitivity and specificity.  2. There is mild right hydronephrosis.  3. There is small right pleural effusion. There is mild ascites.  4. Mild hepatomegaly with diffuse fatty infiltration.  5. Details and other findings as noted above.                                         CT Head Without Contrast (Final result)  Result time 04/07/23 14:00:32      Final result by Dong Grant MD (04/07/23 14:00:32)                   Narrative:    EXAMINATION  CT HEAD WITHOUT CONTRAST    CLINICAL HISTORY  Head trauma, moderate-severe;trauma;    TECHNIQUE  Axial non-contrast CT images of the head were acquired and multiplanar reconstructions accomplished by a CT technologist at a separate workstation, pushed to PACS for physician review.    COMPARISON  None available at the time of the initial interpretation.    FINDINGS  Images were reviewed in subdural, brain, soft tissue, and bone windows.    Exam quality: Motion/streak artifact limits assessment of the posterior fossa.    Hemorrhage: No evidence of acute hyperattenuating blood products.    Parenchyma: There is diffuse bilateral supratentorial white matter hypoattenuation, typical of chronic microvascular changes. No discrete mass, mass  effect, or CT evidence of acute large vascular territory insult. Gray-white differentiation is preserved.    Midline shift: None.    CSF spaces: Proportional appearance of ventricular and sulcal enlargement. No hydrocephalus. No masses or fluid collections.    Vasculature: No focally hyperdense artery. Scattered carotid siphon calcifications are present. No abnormal densities within the dural sinuses.    Other findings: No abnormalities of the scalp or subjacent osseous structures. Mastoids are well aerated. No focal abnormality of the sella. The included facial structures are unremarkable.    IMPRESSION  1. No acute intracranial abnormality.  2. Age-related atrophy and chronic sequela of white matter microvascular disease.  3. Additional chronic secondary details discussed above.    RADIATION DOSE  Automated tube current modulation, weight-based exposure dosing, and/or iterative reconstruction technique utilized to reach lowest reasonably achievable exposure rate.    DLP: 956 mGy*cm      Electronically signed by: Dong Grant  Date:    04/07/2023  Time:    14:00                                      CT Abdomen Pelvis With Contrast (Final result)  Result time 04/07/23 14:07:39      Final result by Lissette Lambert MD (04/07/23 14:07:39)                   Impression:      1. Anasarca.  Bilateral pleural effusions, free intraperitoneal fluid, mesenteric edema, retroperitoneal edema, presacral edema, body wall edema  2. Filling defect at the left ventricular apex highly suspicious for thrombus.  Correlate with echocardiogram.  3. Cardiomegaly  4. It is difficult to exclude bowel wall edema particularly at the colon which is decompressed.  5. Severe aortoiliac atherosclerosis  6.  This report was flagged in Epic as abnormal.      Electronically signed by: Lissette Lambert  Date:    04/07/2023  Time:    14:07               Narrative:    EXAMINATION:  CT ABDOMEN PELVIS WITH CONTRAST    CLINICAL HISTORY:  Abdominal  pain, acute, nonlocalized;    TECHNIQUE:  Helically acquired images with axial, sagittal and coronal reformations were obtained from the lung bases to the pubic symphysis after the IV administration of contrast.    Automated tube current modulation, weight-based exposure dosing, and/or iterative reconstruction technique utilized to reach lowest reasonably achievable exposure rate.    DLP: 1651 mGy*cm    COMPARISON:  CT chest 11/04/2020    FINDINGS:  LIMITATIONS: Beam hardening artifact related to placement of patient's arms down by the sides.    HEART: Cardiomegaly.  Filling defect at the left ventricular apex measuring approximately 4.3 cm (2, 11).  Distal aspect of a pacer device is seen at the right atrium and right ventricular apex.    LUNG BASES: There are small bilateral pleural effusions.  Basilar atelectasis.    LIVER: Normal attenuation. No appreciable focal hepatic lesion.    BILIARY: Possible small dependently layering gallstone.    PANCREAS: No inflammatory change.    SPLEEN: Normal in size    ADRENALS: No mass.    KIDNEYS/URETERS: Beam hardening artifact obscures evaluation of the kidneys.  Left renal cyst requires no dedicated imaging follow-up.  No hydronephrosis seen.    GI TRACT/MESENTERY:  No evidence of bowel obstruction.  The colon is decompressed which limits evaluation for wall thickening.    PERITONEUM AND RETROPERITONEUM: There is small volume free intraperitoneal fluid.  There is mesenteric edema.  There is retroperitoneal edema.  Presacral edema.No free air.    LYMPH NODES: No enlarged lymph nodes by size criteria.    VASCULATURE: Severe aortoiliac atherosclerosis.    BLADDER: Normal appearance given degree of distention.    REPRODUCTIVE ORGANS: Normal as visualized.    SOFT TISSUES: Body wall edema.    BONES: No acute osseous abnormality.                                       X-Ray Hip 2 or 3 views Right (with Pelvis when performed) (Final result)  Result time 04/07/23 13:28:25      Final  result by Lissette Lambert MD (04/07/23 13:28:25)                   Impression:      No acute osseous abnormality.      Electronically signed by: Lissette Lambert  Date:    04/07/2023  Time:    13:28               Narrative:    EXAMINATION:  XR HIP WITH PELVIS WHEN PERFORMED, 2 OR 3  VIEWS RIGHT    CLINICAL HISTORY:  Unspecified fall, initial encounter    TECHNIQUE:  AP view of the pelvis and AP and frog leg lateral view of the right hip were performed.    COMPARISON:  None.    FINDINGS:  BONE: No fracture. No dislocation.    SOFT TISSUES: Calcific atherosclerosis.                                          Physical Exam  Constitutional:       Appearance: He is ill-appearing.   Cardiovascular:      Rate and Rhythm: Normal rate and regular rhythm.      Heart sounds: Normal heart sounds.   Pulmonary:      Effort: Pulmonary effort is normal.   Skin:     Comments: LLE cold from ankle to toes  No doppler or pedal pulse to Left foot.  Left foot pale   Neurological:      Mental Status: He is disoriented.       Home Medications:   No current facility-administered medications on file prior to encounter.     Current Outpatient Medications on File Prior to Encounter   Medication Sig Dispense Refill    apixaban (ELIQUIS) 5 mg Tab Take 5 mg by mouth.      aspirin (ECOTRIN) 81 MG EC tablet Take 1 tablet (81 mg total) by mouth once daily. 30 tablet 1    atorvastatin (LIPITOR) 80 MG tablet Take 80 mg by mouth.      carvediloL (COREG) 12.5 MG tablet Take 12.5 mg by mouth 2 (two) times daily.      clopidogreL (PLAVIX) 75 mg tablet Take 75 mg by mouth.      famotidine (PEPCID) 20 MG tablet Take 20 mg by mouth.      losartan (COZAAR) 50 MG tablet Take 1 tablet by mouth once daily.      metFORMIN (GLUCOPHAGE) 1000 MG tablet Take 1,000 mg by mouth.      mupirocin (BACTROBAN) 2 % ointment Apply to nostrils twice daily 1 Tube 0    nitroGLYCERIN (NITROSTAT) 0.4 MG SL tablet DISSOLVE 1 TABLET UNDER THE TONGUE EVERY 5 MINUTES AS NEEDED FOR  CHEST PAIN FOR UP TO 3 DOSES. IF NO RELIEF, SEEK MEDICAL ATTENTION      sacubitriL-valsartan (ENTRESTO) 24-26 mg per tablet Take by mouth.         Current Inpatient Medications:    Current Facility-Administered Medications:     acetaminophen tablet 1,000 mg, 1,000 mg, Oral, Q6H PRN, Nate Robledo MD    acetaminophen tablet 650 mg, 650 mg, Oral, Q4H PRN, Nate Robledo MD    aluminum-magnesium hydroxide-simethicone 200-200-20 mg/5 mL suspension 30 mL, 30 mL, Oral, QID PRN, Nate Robledo MD    carvediloL tablet 12.5 mg, 12.5 mg, Oral, BID, Nate Robledo MD, 12.5 mg at 04/08/23 1336    cloNIDine tablet 0.2 mg, 0.2 mg, Oral, TID PRN, Nate Robledo MD    dextrose 10% bolus 125 mL 125 mL, 12.5 g, Intravenous, PRN, Nate Robledo MD    dextrose 10% bolus 250 mL 250 mL, 25 g, Intravenous, PRN, Nate Robledo MD    furosemide injection 80 mg, 80 mg, Intravenous, BID, Nate Robledo MD, 80 mg at 04/08/23 1803    glucagon (human recombinant) injection 1 mg, 1 mg, Intramuscular, PRN, Nate Robledo MD    hydrALAZINE injection 10 mg, 10 mg, Intravenous, Q4H PRN, Nate Robledo MD    insulin aspart U-100 injection 0-5 Units, 0-5 Units, Subcutaneous, Q6H PRN, Nate Robledo MD    melatonin tablet 6 mg, 6 mg, Oral, Nightly PRN, Nate Robledo MD    nitroGLYCERIN 2% TD oint ointment 1 inch, 1 inch, Topical (Top), Q6H, Monica Hendrickson, FNP, 1 inch at 04/09/23 0554    ondansetron injection 4 mg, 4 mg, Intravenous, Q4H PRN, Nate Robledo MD    piperacillin-tazobactam (ZOSYN) 4.5 g in dextrose 5 % in water (D5W) 5 % 100 mL IVPB (MB+), 4.5 g, Intravenous, Q8H, Nate Robledo MD, Stopped at 04/09/23 0856    polyethylene glycol packet 17 g, 17 g, Oral, BID PRN, Nate Robledo MD    potassium chloride SA CR tablet 40 mEq, 40 mEq, Oral, Once, Monica Hendrickson, ABDIFATAHP    prochlorperazine injection Soln 5 mg, 5 mg, Intravenous, Q6H PRN, Nate Robledo MD    sacubitriL-valsartan 24-26 mg per tablet 1 tablet, 1 tablet, Oral, BID, Nate Robledo MD, 1 tablet at 04/08/23  1336    senna-docusate 8.6-50 mg per tablet 2 tablet, 2 tablet, Oral, BID PRN, Nate Robledo MD    sodium chloride 0.9% flush 10 mL, 10 mL, Intravenous, PRN, Nate Robledo MD    Flushing PICC Protocol, , , Until Discontinued **AND** sodium chloride 0.9% flush 10 mL, 10 mL, Intravenous, Q6H, 10 mL at 04/09/23 0555 **AND** sodium chloride 0.9% flush 10 mL, 10 mL, Intravenous, PRN, Nahid Sen MD     Imaging:  Claudication or leg ischemia;pad; cta aorta with runoff;     TECHNIQUE:  CT images of the abdomen, pelvis, and lower extremities before and after IV contrast. Axial, coronal and sagittal reformatted images are reviewed. 3D reconstructed images were also performed for further evaluation of the vasculature. Dose length product is 2453 mGycm. Automatic exposure control, adjustment of mA/kV or iterative reconstruction technique was used to limit radiation dose.     COMPARISON:  CT abdomen pelvis dated 04/07/2023     FINDINGS:  There are diffuse atherosclerotic changes of the abdominal aorta and its branches.  The celiac artery is patent with mild narrowing.  The superior mesenteric artery and inferior mesenteric artery are patent.  The common iliac arteries are patent with mild narrowing bilaterally.  There is non opacification of the left internal iliac artery.  The proximal right internal iliac artery is patent.  The external iliac arteries are patent with moderate narrowing bilaterally.     The right femoral artery is patent with mild multifocal narrowing.  There is a patent stent within the distal femoral artery.  The popliteal artery is patent with mild multifocal narrowing.  There is no flow within the tibioperoneal trunk.  There is reconstitution of flow in the peroneal artery, with minimal scattered flow the posterior and anterior tibial arteries.     The left femoral artery is occluded with patent deep femoral artery.  The popliteal artery is occluded.  There is reconstitution of flow with minimal scattered  "flow in the peroneal and posterior tibial arteries.     There are small bilateral pleural effusions with dependent airspace consolidations.  There is no acute abnormality of the liver, gallbladder, spleen or pancreas.  The adrenal glands are difficult to visualize.  There is no hydronephrosis.  There is no bowel obstruction.  Small volume of ascites is noted.  There is diffuse anasarca     Impression:     1. Extensive atherosclerotic changes of the abdominal aorta and its branches with bilateral peripheral vascular disease.  2. Minimal scattered bilateral three-vessel runoff.          VTE Risk Mitigation (From admission, onward)           Ordered     IP VTE LOW RISK PATIENT  Once         04/07/23 2156     Place sequential compression device  Until discontinued         04/07/23 2156                    Assessment:   See below MDM formulated by me (Nahid Sen MD):     NSTEMI, unspecified   --troponin 1.266-1.061-1.289  Native CAD  --hx CABG 3V 2021  ICMO  Chronic combined systolic and diastolic HF  --EF 15%, grade I LVDD  --BNP 3249  LV thrombus  Critical limb ischemia LLE  Anemia  Thrombocytopenia  --plts decreasing 40  PAFib  --CHADS VASC score 5  --on eliquis outpatient  VHD  --mild MR/TR  HTN  HLD  T2DM  --A1c 10.7  CINTHIA  Congestive hepatopathy  Hx CHB  --dual chamber PPM  Tobacco dependence  Hypokalemia    Plan:   Avoid anticoagulation due to thrombocytopenia. Hold aspirin and plavix  CTA reviewed. Consult vascular surgery.   Unable to proceed with peripheral angiogram and angioplasty due to thrombocytopenia  I discussed with vascular surgery, due to high comorbidities as well poor targets and lower leg including popliteal patient is not a candidate for surgery either.  Currently not on heparin due to thrombocytopenia.  Will start on Bivalirudin.   Heme consult to review this thrombocytopenia and coagulopathy as well thrombosis.   Apply NTP 1" to left foot  Start Lasix 80mg IVP bid  Strict I&Os/daily " weights  Resume entresto 24-26mg bid and coreg 12.5mg bid once tolerating oral intake  Replete potassium  There is no family member to discuss regarding patient condition.  We may have to go amputation route for this cold left lower leg.  We will re-evaluate in the morning.      ZENYEP Cast and Nahid Sen MD  Cardiology  [facility] Tele[facility]  04/09/2023

## 2023-04-10 PROBLEM — R60.1 ANASARCA: Status: ACTIVE | Noted: 2023-01-01

## 2023-04-10 PROBLEM — Z51.5 PALLIATIVE CARE STATUS: Status: ACTIVE | Noted: 2023-01-01

## 2023-04-10 NOTE — PT/OT/SLP PROGRESS
SLP attempting to see pt for evaluation however pt agitated, nonverbal, and not following commands.  Comprehensive assessment of swallowing not appropriate at this time.  Rec: continue NPO, ok for meds crushed in pudding as tolerated.  SLP to continue to follow.

## 2023-04-10 NOTE — CONSULTS
Hematology/Oncology  Consult Note    Patient Name: Darren Patel  MRN: [id number]  Admission Date: 4/7/2023  Hospital Length of Stay: 3 days  Attending Provider: Nate Robledo MD  Consulting Provider: Esdras Genao MD  Principal Problem:Acute on chronic HFrEF (heart failure with reduced ejection fraction)    Inpatient consult to Hematology  Consult performed by: Esdras Genao MD  Consult ordered by: Nahid Sen MD      Subjective:     HPI:  67-year-old white male smoker with HTN, hyperlipidemia, PAD, DM and extensive cardiac history including ischemic cardiomyopathy, CAD s/p CABG, paroxysmal AFib on chronic Eliquis and third-degree AV block s/p PM.  He presented to the ER 4/7/23 following a syncopal episode at his home.  He does not remember anything up to the episode.  Echocardiogram showed LV enlargement with eccentric hypertrophy and severely decreased systolic function with an EF of 15%.  There was a large ventricular thrombus fixed and located in the apex.  There was also a large irregular right atrial thrombus which was mobile and severe left atrial enlargement. CT A/P showed anasarca with bilateral pleural effusions, ascites, mesenteric and retroperitoneal edema and body wall edema.  There was cardiomegaly with a filling defect in the left ventricular apex. CTA A/P showed extensive arthrosclerotic changes of the abdominal aorta and its branches with bilateral peripheral vascular disease.  CBC on admission showed thrombocytopenia with a platelet count of 57,000. Previous CBC from 2/23/23 showed a normal platelet count of 261,000. He was not on any new medications.  He denied any abnormal bruising or mucocutaneous bleeding.  PT and PTT were both prolonged.  Initial fibrinogen was slightly low but normal on repeat testing.  Due to the urgent need for anticoagulation, he received 1 unit of platelets for 9/23 with platelet count increasing from 40,000 to 75,000.  He is currently on bivalirudin infusion for  anticoagulation.  I was consulted for a Hematology evaluation.  He is confused and in soft hand mittens.  No family members present at the time of my evaluation.      Medications:  Continuous Infusions:   bivulirudin (ANGIOMAX) infusion 0.08 mg/kg/hr (04/09/23 1810)    dextrose 5 % and 0.45 % NaCl 75 mL/hr at 04/09/23 2015     Scheduled Meds:   carvediloL  12.5 mg Oral BID    furosemide (LASIX) injection  80 mg Intravenous BID    nitroGLYCERIN 2% TD oint  1 inch Topical (Top) Q6H    piperacillin-tazobactam (ZOSYN) IVPB  4.5 g Intravenous Q8H    potassium chloride  40 mEq Intravenous Q4H    potassium chloride  40 mEq Oral Once    sacubitriL-valsartan  1 tablet Oral BID    sodium chloride 0.9%  10 mL Intravenous Q6H     PRN Meds:sodium chloride, acetaminophen, acetaminophen, aluminum-magnesium hydroxide-simethicone, cloNIDine, dextrose 10%, dextrose 10%, glucagon (human recombinant), hydrALAZINE, insulin aspart U-100, melatonin, ondansetron, polyethylene glycol, prochlorperazine, senna-docusate 8.6-50 mg, sodium chloride 0.9%, Flushing PICC Protocol **AND** sodium chloride 0.9% **AND** sodium chloride 0.9%, Pharmacy to dose Vancomycin consult **AND** vancomycin - pharmacy to dose     Review of patient's allergies indicates:  No Known Allergies     PMHx:  HTN, HLD, T2DM, CAD, ischemic CM, paroxysmal AFib, third-degree AV block with PM, severe PAD  PSHx:  Pacemaker, CABG, multiple peripheral vascular interventions  SH:  + tobacco 1-1.5 ppd.  Denies alcohol use.  Lives alone in [place].  FH:  Unobtainable.      Review of Systems   Unable to perform ROS: Mental status change       Objective:     Vital Signs (Most Recent):  Temp: 97.9 °F (36.6 °C) (04/10/23 0418)  Pulse: 80 (04/10/23 0418)  Resp: 18 (04/10/23 0418)  BP: (!) 90/58 (04/10/23 0418)  SpO2: 99 % (04/10/23 0418)   Vital Signs (24h Range):  Temp:  [97.4 °F (36.3 °C)-98.8 °F (37.1 °C)] 97.9 °F (36.6 °C)  Pulse:  [80-81] 80  Resp:  [18-20] 18  SpO2:  [90 %-99 %]  99 %  BP: ()/(52-72) 90/58     Weight: 85 kg (187 lb 6.3 oz)  Body surface area is 2.08 meters squared.      Physical Exam  Constitutional:       Comments: Thin, confused white male with mitts on hands   HENT:      Head: Normocephalic and atraumatic.      Mouth/Throat:      Mouth: Mucous membranes are moist.      Pharynx: Oropharynx is clear. No posterior oropharyngeal erythema.   Eyes:      Extraocular Movements: Extraocular movements intact.      Conjunctiva/sclera: Conjunctivae normal.      Pupils: Pupils are equal, round, and reactive to light.   Cardiovascular:      Rate and Rhythm: Normal rate and regular rhythm.      Heart sounds: No murmur heard.     Comments: Well-healed sternotomy incision.  Pacemaker left chest wall.  Pulmonary:      Comments: Lungs clear anteriorly  Abdominal:      General: Abdomen is flat. There is no distension.      Palpations: Abdomen is soft. There is no mass.      Tenderness: There is no abdominal tenderness.      Comments: Decreased bowel sounds   Genitourinary:     Comments: + Siegel catheter, clear urine  Musculoskeletal:         General: No swelling or tenderness. Normal range of motion.      Cervical back: Neck supple. No tenderness.   Skin:     General: Skin is warm and dry.      Findings: No rash.      Comments: Small left periorbital ecchymoses   Neurological:      General: No focal deficit present.      Mental Status: He is alert.      Cranial Nerves: No cranial nerve deficit.      Motor: No weakness.       Significant Labs:   CBC:   Recent Labs   Lab 04/09/23  0519 04/10/23  0424   WBC 10.5 11.1   HGB 11.4* 11.3*   HCT 35.4* 36.2*   PLT 40* 75*    and Coagulation: No results for input(s): PT, INR, APTT in the last 48 hours.    Peripheral smear (my review):  Normocytic, normochromic RBCs, no schistocytes.  Platelets decreased with normal morphology.    Diagnostic Results:  CT images reviewed by me personally.    Impression/Recommendations:   IMPRESSION  Acute  thrombocytopenia - present on admission  Syncopal episode  Left ventricular apical and right atrial thrombi  Ischemic cardiomyopathy (EF 15%) with anasarca  Severe PAD      RECOMMENDATIONS  Thrombocytopenia present on admission.  No history of new medications.  Prolonged PT/PTT and decreased fibrinogen level on admission consistent with mild DIC.  No history of heparin exposure and no schistocytes on the peripheral smear.  Also assume some degree of consumption given the left ventricular and right apical thrombi.  Unclear if represents Eliquis failure or if there was an issue of noncompliance.  Patient confused and unable to answer questions appropriately.  Continue current anticoagulation with bivalirudin.  Will send antiplatelet antibodies (can take 5-7 days for results).  If platelet count drops tomorrow, consider trial of low-dose prednisone.  Repeat labs in a.m.  Thank you for the consult.  Please call with any questions.      IRON DYSON MD  Hematology/Oncology

## 2023-04-10 NOTE — NURSING
Artificial Intelligence Notification  [facility]  [address] [place] [address]  Phone: [phone]     This documentation was triggered by an Artificial Intelligence Notification.     Admit Date: 2023   LOS: 3  Code Status: Full Code  : 1955  Age: 67 y.o.  Weight:   Wt Readings from Last 1 Encounters:   23 85 kg (187 lb 6.3 oz)        Sex: male  Bed: [address] [place] [address] A  MRN: [id number]  Attending Physician: Nate Robledo MD     Date of Alert: 04/10/2023  Time AI Alert Received: 023             Vitals:    04/10/23 0215   BP: (!) 91/52   Pulse: 80   Resp:    Temp: 97.4 °F (36.3 °C)       Artificial Intelligence alert discussed with Provider:     Name: Tressa Valdez RN   Date/Time of Provider Notification: 0325      Patient Condition: Patient's BP softer than was prior. Pt on Angiomax and plts low. Receiving platelets, H/H drawn, awaiting results. Continue to monitor. Spoke with nurse, told her to let me know if patient condition changes or if any questions or concerns.

## 2023-04-10 NOTE — PLAN OF CARE
Unable to perform D/C assessment on patient due to decreased cognitive status at present. There are no family/personal contacts listed on the EMR.

## 2023-04-10 NOTE — PROGRESS NOTES
[facility]  Hospital Medicine Progress Note        Chief Complaint: Inpatient Follow-up for Unwitnessed fall/nstemi/chf/ thrombus/ thrombocytopenia     HPI: This is a 67-year-old male with medical history of ischemic cardiomyopathy/HFrEF 35% per echo 2021, CAD s/p CABG, paroxysmal AFib on Eliquis, third-degree AV block  s/p dual-chamber pacemaker, PAD/multiple interventions on Plavix, T2DM, HTN, HLD present to the ED after being found by his neighbor laying on the floor naked.  Patient has no recollection of what happened but report that he fell a day prior and currently complaining of right hip and right-sided abdominal pain.  Report over the past week he was feeling extremely weak but denies focal extremity weakness or numbness.  Denies chest pain but report dyspnea on exertion.  Denies cough, fever or chills.    On arrival to ED he was afebrile, hypertensive and saturating 97% on 3 L OxyMask.  EKG showed ventricular paced rhythm.  Labs notable for WBC 12.3, hemoglobin 12.2, platelets 57, INR 2.06, sodium 146, potassium 4.3, chloride 115, CO2 21, creatinine 1.32, BUN 48, albumin 2.2, normal alkaline phosphatase, total bilirubin 4.4, , , BNP 3249, , troponin 1.266 > 1.061, , urinalysis unremarkable.    CT head showed no acute intracranial abnormalities.      CT abdomen and pelvis show evidence of anasarca with bilateral small pleural effusion, small ascites and mesenteric edema, presacral and body wall edema, cardiomegaly and filling defect in the LV apex highly suspicious for thrombus, severe aortoiliac atherosclerosis.    Pelvic x-ray showed evidence fractures.    Echo final read pending but noted for LVEF 15%, grade 1 diastolic dysfunction, large fixed LV apical thrombus present, mobile echodensity noted on RV pacer lead.     BLE venous ultrasound showed no evidence of DVT.  BLE arterial ultrasound show bilateral severe arterial flow reduction.    He was given 1  L of normal saline, vancomycin, Zosyn, aspirin 325mg, cardiology consulted and referred to hospital medicine service for further evaluation and management.        Interval Hx:   4/8/23 Pt tender all over. Very tender over right hip but no fx on ct abd and pelvis.  Abdominal us raises possibility of acute cholecystitis. Will  consult general surgery for eval. Worsening thrombocytopenia. Also complains of sob, I elevated head of bed and increased o2 to 3 liters for O2 sat to 3liters.  Siegel placed for retention     4/9/23 no change in physical exam. Seen by  general surgery with no findings to support acute cholecystitis.  Now on angiomax. Platelets at 40,000 with low fibrinogen/ prolonged inr. Oncology consulted by cis. There is no active bleeding. Cta abd w run off w severe atherosclerotic disease of aorta and branches. No flow below knees. Tropo elevated       04/10/2023 Dr. Almeida-chart reviewed patient examined.  Patient unable to participate in HPI.  Yesterday patient was started on Angiomax and give him platelets with expected outcome for lower GI bleed.  Manager max will be discontinued.  Left lower extremity continues to be cold with no pulses.  In addition to this he has a left ventricular thrombus/right atrial thrombus as well as mobile echodensity in the right ventricular pacer lead.  CTA aorta with runoff showed occluded branches and on physical examination he has no pulses infrapopliteal bilaterally.  Patient has an ejection fraction of 15% as well as significant history of paroxysmal atrial fibrillation, complete heart block, CAD, diabetes type 2 and severe peripheral arterial disease.  We will be attempting to locate any family members for POA.  Palliative team was consulted appreciate assistance.  Patient remains full code with high probability of imminent death.  Patient is not a candidate at this moment for any surgical intervention or procedure.    Objective/physical exam:  General:  Disoriented  unable to participate in HPI    HEENT:  Bruise over the left forehead and neck    Neck:  JVD up to ear lobe    Chest:  decrease breath sound w some Bilateral rales at the bases    CVS: Regular rhythm. Normal S1/S2. +2 pedal edema    Abdomen: nondistended, normoactive BS, epigastric and right upper quadrant tenderness and voluntary guarding    MSK: No obvious deformity or joint swelling, no palpable pulses infrapopliteal bilaterally     Skin: Warm and dry except left lower extremity that is cyanotic and cold    Neuro: AAOx3, no focal neurological deficit    Psych: Cooperative but anxious    VITAL SIGNS: 24 HRS MIN & MAX LAST   Temp  Min: 96.7 °F (35.9 °C)  Max: 98.3 °F (36.8 °C) 97.2 °F (36.2 °C)   BP  Min: 90/53  Max: 126/72 (!) 90/53   Pulse  Min: 80  Max: 81  80   Resp  Min: 18  Max: 18 18     SpO2  Min: 91 %  Max: 100 % 99 %       Recent Labs   Lab 04/08/23 0431 04/09/23  0519 04/10/23  0424   WBC 8.7 10.5 11.1   RBC 4.56* 4.07* 3.94*   HGB 13.1* 11.4* 11.3*   HCT 42.2 35.4* 36.2*   MCV 92.5 87.0 91.9   MCH 28.7 28.0 28.7   MCHC 31.0* 32.2* 31.2*   RDW 21.1* 21.2* 21.5*   PLT 52* 40* 75*   MPV  --   --  11.3*         Recent Labs   Lab 04/08/23 0431 04/09/23 0519 04/09/23  1418 04/10/23  0424   * 154*  --  156*   K 4.1 2.8*  --  2.5*   CO2 22* 31  --  40*   BUN 48.9* 43.2*  --  39.3*   CREATININE 1.06 1.17 1.39* 1.18   CALCIUM 8.9 8.4*  --  8.6*   MG 2.10 1.90  --  2.00   ALBUMIN 2.3* 2.1* 2.2* 2.1*   ALKPHOS 96 91 100 92   * 128* 128* 105*   AST 80* 73* 90* 95*   BILITOT 5.6* 6.5* 6.9* 6.4*            Microbiology Results (last 7 days)       ** No results found for the last 168 hours. **             See below for Radiology    Scheduled Med:   carvediloL  12.5 mg Oral BID    furosemide (LASIX) injection  80 mg Intravenous BID    miconazole NITRATE 2 %   Topical (Top) BID    mupirocin   Topical (Top) BID    nitroGLYCERIN 2% TD oint  1 inch Topical (Top) Q6H    piperacillin-tazobactam (ZOSYN) IVPB   4.5 g Intravenous Q8H    potassium chloride  40 mEq Oral Once    sacubitriL-valsartan  1 tablet Oral BID    sodium chloride 0.9%  10 mL Intravenous Q6H    vancomycin (VANCOCIN) IVPB  1,000 mg Intravenous Q12H        Abdominal ultrasound - 1. The gallbladder appears full but non-distended. The gallbladder wall measures 3.5 mm in thickness. The sonographic [place] sign is positive. There are two tiny echogenic foci along the gallbladder wall, which measure 9 x 5 x 5 mm and 7 x 3 x 6 mm, which may reflect polyps. Correlate with clinical and laboratory findings as regards additional evaluation and follow-up as the above findings could reflect cholecystitis although the presence of significant ascites decreases sensitivity and specificity.     2. There is mild right hydronephrosis.     3. There is small right pleural effusion. There is mild ascites.     4. Mild hepatomegaly with diffuse fatty infiltration.     5. Details and other findings as noted above.     No significant discrepancy with overnight report.   bivulirudin (ANGIOMAX) infusion 0.08 mg/kg/hr (04/09/23 1810)    dextrose 5 % and 0.45 % NaCl 75 mL/hr at 04/09/23 2015        PRN Meds:  sodium chloride, acetaminophen, acetaminophen, aluminum-magnesium hydroxide-simethicone, cloNIDine, dextrose 10%, dextrose 10%, glucagon (human recombinant), hydrALAZINE, insulin aspart U-100, melatonin, ondansetron, polyethylene glycol, prochlorperazine, senna-docusate 8.6-50 mg, sodium chloride 0.9%, Flushing PICC Protocol **AND** sodium chloride 0.9% **AND** sodium chloride 0.9%, Pharmacy to dose Vancomycin consult **AND** vancomycin - pharmacy to dose       Assessment/Plan:    Unwitnessed fall/syncope  Echo -ICMO w ef 15% and LV thrombus/ RA thrombus/ mobile echodensity noted in pacer lead. While on eliquis ?  cus-The left common, external and internal carotid arteries are occluded.  -The right internal carotid artery demonstrated less than 50% stenosis.  -Bilateral vertebral  arteries were patent with antegrade flow.    Acute on chronic systolic and diastolic heart failure with ejection fraction of 15%/diastolic dysfunction    NSTEMI unknown type 2 in the setting of end-stage CMO      Congestive hepatopathy versus acute cholecystitis  - surgery evaluated pt / no cholecystitis      Acute kidney injury-resolved      Acute thrombocytopenia -etiology possibly ?cardiac/LYNCH cirrhosis or infection/sepsis  - post platelet transfusion  - now with lower GI bleed  - on vanco/ zosyn with no source of infection      Hypernatremia/hyperchloremia  - replacing       small right pleural effusion.        mild ascites./cardiomegaly      Severe PAD lower aorta and branches according to CTA aorta with runoff      Left lower extremity critical limb ischemia most likely embolic from left ventricular thrombus       T2DM  - hemoglobin a1c > 10      mild right hydronephrosis./urinary retention - limon     HX Severe BLE PAD, HTN, HLD, CHB s/p PPM, paroxysmal AFib on Eliquis,     Paf/right atrial thrombus/ left ventricular  thrombus/    Plan:      Telemetry monitoring      Continue IV vanco/ Zosyn to cover possible sepsis but so far all cultures negative.  We will DC antibiotics      Lasix 80 mg IV Q12H, accurate I/O, daily weight, Keep K>4, Mg>2      Continue carvedilol and Entresto      Hold statin at this time due to elevated transaminases      Hep panel.    Continue to attempt to replace potassium      SSI Q6H,     Overall prognosis is very poor at this moment . Probably dealing w severe pad/ nstemi plus complications from RA/LV thrombus with embolization to lower extremities with left lower extremity acute limb ischemia now with GI bleed post Angiomax      Critical care time:  45 minutes    Critical care diagnosis:  Acute on chronic HFrEF/ severe electrolyte  abnormalities/nstemi./left lower extremity critical limb ischemia/encephalopathy/thrombocytopenia/lower GI bleed/profound hypokalemia/        Full code          VTE prophylaxis: angiomax discontinued secondary to gib    Patient condition:  Guarded    Anticipated discharge and Disposition:   tbd      All diagnosis and differential diagnosis have been reviewed; assessment and plan has been documented; I have personally reviewed the labs and test results that are presently available; I have reviewed the patients medication list; I have reviewed the consulting providers response and recommendations. I have reviewed or attempted to review medical records based upon their availability    All of the patient's questions have been  addressed and answered. Patient's is agreeable to the above stated plan. I will continue to monitor closely and make adjustments to medical management as needed.  _____________________________________________________________________    Nutrition Status:    Radiology:  CTA Runoff ABD PEL Bilat Lower Ext  Narrative: EXAMINATION:  CTA RUNOFF ABD PEL BILAT LOWER EXT    CLINICAL HISTORY:  Claudication or leg ischemia;pad; cta aorta with runoff;    TECHNIQUE:  CT images of the abdomen, pelvis, and lower extremities before and after IV contrast. Axial, coronal and sagittal reformatted images are reviewed. 3D reconstructed images were also performed for further evaluation of the vasculature. Dose length product is 2453 mGycm. Automatic exposure control, adjustment of mA/kV or iterative reconstruction technique was used to limit radiation dose.    COMPARISON:  CT abdomen pelvis dated 04/07/2023    FINDINGS:  There are diffuse atherosclerotic changes of the abdominal aorta and its branches.  The celiac artery is patent with mild narrowing.  The superior mesenteric artery and inferior mesenteric artery are patent.  The common iliac arteries are patent with mild narrowing bilaterally.  There is non opacification of the left internal iliac artery.  The proximal right internal iliac artery is patent.  The external iliac arteries are patent with moderate  narrowing bilaterally.    The right femoral artery is patent with mild multifocal narrowing.  There is a patent stent within the distal femoral artery.  The popliteal artery is patent with mild multifocal narrowing.  There is no flow within the tibioperoneal trunk.  There is reconstitution of flow in the peroneal artery, with minimal scattered flow the posterior and anterior tibial arteries.    The left femoral artery is occluded with patent deep femoral artery.  The popliteal artery is occluded.  There is reconstitution of flow with minimal scattered flow in the peroneal and posterior tibial arteries.    There are small bilateral pleural effusions with dependent airspace consolidations.  There is no acute abnormality of the liver, gallbladder, spleen or pancreas.  The adrenal glands are difficult to visualize.  There is no hydronephrosis.  There is no bowel obstruction.  Small volume of ascites is noted.  There is diffuse anasarca  Impression: 1. Extensive atherosclerotic changes of the abdominal aorta and its branches with bilateral peripheral vascular disease.  2. Minimal scattered bilateral three-vessel runoff.    Electronically signed by: Franchesca Valdez  Date:    04/08/2023  Time:    15:49  X-Ray Chest 1 View  Narrative: EXAMINATION:  XR CHEST 1 VIEW    CLINICAL HISTORY:  PICC placement;    TECHNIQUE:  AP chest    COMPARISON:  Chest x-ray dated 04/08/2023    FINDINGS:  Right-sided PICC line has its tip over the superior vena cava.  Left chest wall pacemaker remains in place.  The heart is stable in size.  There are increasing small pleural effusions with basilar airspace opacities.  There is no definite visible pneumothorax.  Impression: Right-sided PICC line with tip over the superior vena cava.    Electronically signed by: Franchesca Valdez  Date:    04/08/2023  Time:    13:28  US Abdomen Limited  Narrative: Technique:Limited right upper quadrant abdominal ultrasound was performed.    Comparison: CT abdomen  pelvis April 7, 2023 none.    Clinical History:Cholecystitis.    Findings:There is small right pleural effusion. There is mild ascites.    Liver:The liver is enlarged and measures 20 centimeters in greatest dimension and demonstrates increased echogenicity which may reflect fatty infiltration.    Biliary ducts:The common bile duct is within normal limits at 2.4 mm in diameter.    Gallbladder:The gallbladder appears full but non-distended. The gallbladder wall measures 3.5 mm in thickness. There is no pericholecystic fluid. The sonographic [place] sign is positive. There are two tiny echogenic foci along the gallbladder wall, which measure 9 x 5 x 5 mm and 7 x 3 x 6 mm, which may reflect polyps.    Pancreas:The pancreas cannot be definitively evaluated due to obscuration by bowel gas.    Kidneys:    Right kidney:There is mild right hydronephrosis.    Dimensions:The right kidney measures 10.3 x 5.2 x 7.5 cm.    Vascular:    Portal vein:Flow parameters are within normal limits with hepatopetal flow.    IVC:The IVC is within normal limits.  Impression: Impression:    1. The gallbladder appears full but non-distended. The gallbladder wall measures 3.5 mm in thickness. The sonographic [place] sign is positive. There are two tiny echogenic foci along the gallbladder wall, which measure 9 x 5 x 5 mm and 7 x 3 x 6 mm, which may reflect polyps. Correlate with clinical and laboratory findings as regards additional evaluation and follow-up as the above findings could reflect cholecystitis although the presence of significant ascites decreases sensitivity and specificity.    2. There is mild right hydronephrosis.    3. There is small right pleural effusion. There is mild ascites.    4. Mild hepatomegaly with diffuse fatty infiltration.    5. Details and other findings as noted above.    No significant discrepancy with overnight report.    Electronically signed by: Noel Yoon  Date:    04/08/2023  Time:    09:35  Echo  · The  left ventricle is mildly enlarged with eccentric hypertrophy and   severely decreased systolic function.  · A large left ventricular thrombus is present. The thrombus is fixed and   located in the apex.  · The estimated ejection fraction is 15%.  · Grade I left ventricular diastolic dysfunction.  · There is a large irregular right atrial thrombus present. The thrombus   is mobile.  · Mobile echodensity noted on pacer lead.  · Severe left atrial enlargement.  · Mild mitral regurgitation.  · The estimated PA systolic pressure is 39 mmHg.  · Mild tricuspid regurgitation.     X-Ray Chest 1 View  Narrative: EXAMINATION:  XR CHEST 1 VIEW    CLINICAL HISTORY:  chf;    TECHNIQUE:  AP chest    COMPARISON:  Chest x-ray dated 02/23/2023    FINDINGS:  Left chest wall pacemaker is in place.  The heart is stable in size.  There is a small left pleural effusion.  There is no definite visible pneumothorax.  Impression: Small left pleural effusion.    Electronically signed by: Franchesca Valedz  Date:    04/08/2023  Time:    09:13      Alex Almeida MD   04/10/2023

## 2023-04-10 NOTE — CONSULTS
Inpatient consult to Palliative Care  Consult performed by: Prince Valdez MD  Consult ordered by: Alex Almeida MD    Patient Name: Darren Patle   MRN: [id number]   Admission Date: 4/7/2023   Hospital Length of Stay: 3   Attending Provider: Nate Robledo MD   Consulting Provider: Prince Valdez M.D.  Reason for Consult: Goals of Care  Primary Care Physician: ZEYNEP Rosales     Principal Problem: Acute on chronic HFrEF (heart failure with reduced ejection fraction)     Patient information was obtained from ER records and primary team.      Final diagnoses:  [W19.XXXA] Fall  [I51.3] Left ventricular thrombus  [R23.1] Pallor  [R23.1] Pallor of extremity  [R53.1] Generalized weakness  [R60.1] Anasarca  [J90] Bilateral pleural effusion  [R74.8] Elevated liver enzymes  [A41.9] Sepsis     Assessment/Plan:     I reviewed the patient and family's understanding of the seriousness of the illness and its expected prognosis. We discussed the patient's goals of care and treatment preferences. We discussed the difference between palliative and curative medicine. I explained the differences between home health, palliative and hospice care. I clarified current code status. I identified the surrogate decision maker or health care POA. I explained the difference between a living will (advanced directive) and [place]. We discussed the patient's chosen code status as listed above and the contents of the LaPOST. I answered all questions and we formulated a plan including recommendations for symptom management and how to best achieve goals of care.     I reviewed the patient's current clinical status with the nurse. I reviewed clinical documentation, labs and imaging.     Symptom management review: pt is lethargic and confused but denies pain, dyspnea or other symptoms.    I was unable to communicate with the patient who is confused and lethargic. He was unable to provide any contacts. No contacts are available for me to  assist at present.  The attending is concerned that the patient is doing poorly and may decline rapidly. Without any family or contacts to speak for him, his wishes may not be able to be honored. I spoke to the attending and will ask case management to assist by contacting law enforcement who may be able to help us get next of kin contacts or at least the patient's neighbor who witnessed him unconsciousness. I will try to set up an ethics committee to review his current situation. I will follow up tomorrow and review if patient is better and able to communicate clearly for a goals of care discussion.    History of Present Illness:     66 y/o WM h/o severe PAD, DM, ICM with EF 15%, CAD, FAF, currently admitted after found unresponsive in his home. He is found by ECHO to have a large left ventricular thrombus and large Rt atrial thrombus. We were consulted to review goals of care.      Active Ambulatory Problems     Diagnosis Date Noted    Complete heart block 11/06/2020    Essential hypertension 11/07/2020    Coronary artery disease involving native coronary artery of native heart without angina pectoris 11/09/2020    Type 2 diabetes mellitus without complication, without long-term current use of insulin 11/10/2020    Cardiac pacemaker in situ 02/22/2021    Ischemic cardiomyopathy 05/03/2022    Paroxysmal atrial fibrillation 05/03/2022    Mixed hyperlipidemia 05/03/2022    Tobacco abuse 05/03/2022    Heart failure with reduced ejection fraction 04/20/2021    Peripheral arterial disease 02/23/2023    Obesity 02/23/2023     Resolved Ambulatory Problems     Diagnosis Date Noted    No Resolved Ambulatory Problems     Past Medical History:   Diagnosis Date    Diabetes mellitus     High cholesterol     Hypertension         Past Surgical History:   Procedure Laterality Date    A-V CARDIAC PACEMAKER INSERTION Left 11/9/2020    Procedure: INSERTION, CARDIAC PACEMAKER, DUAL CHAMBER;  Surgeon: Nolan Hernandez MD;  Location:  [facility] CATH LAB;  Service: Cardiology;  Laterality: Left;    CORONARY ANGIOGRAPHY N/A 11/9/2020    Procedure: ANGIOGRAM, CORONARY ARTERY - right radial;  Surgeon: Nolan Hernandez MD;  Location: [facility] CATH LAB;  Service: Cardiology;  Laterality: N/A;        Review of patient's allergies indicates:  No Known Allergies       Current Facility-Administered Medications:     0.9%  NaCl infusion (for blood administration), , Intravenous, Q24H PRN, Nahid Sen MD    acetaminophen tablet 1,000 mg, 1,000 mg, Oral, Q6H PRN, Nate Robledo MD    acetaminophen tablet 650 mg, 650 mg, Oral, Q4H PRN, Nate Robledo MD    aluminum-magnesium hydroxide-simethicone 200-200-20 mg/5 mL suspension 30 mL, 30 mL, Oral, QID PRN, Nate Robledo MD    bivalirudin (ANGIOMAX) 250 mg in dextrose 5 % (D5W) 50 mL infusion, 0.08 mg/kg/hr, Intravenous, Continuous, Nahid Sen MD, Last Rate: 1.4 mL/hr at 04/09/23 1810, 0.08 mg/kg/hr at 04/09/23 1810    carvediloL tablet 12.5 mg, 12.5 mg, Oral, BID, Nate Robledo MD, 12.5 mg at 04/09/23 2039    cloNIDine tablet 0.2 mg, 0.2 mg, Oral, TID PRN, Nate Robledo MD    dextrose 10% bolus 125 mL 125 mL, 12.5 g, Intravenous, PRN, Nate Robledo MD    dextrose 10% bolus 250 mL 250 mL, 25 g, Intravenous, PRN, Nate Robledo MD    dextrose 5 % and 0.45 % NaCl infusion, , Intravenous, Continuous, Alex Almeida MD, Last Rate: 75 mL/hr at 04/09/23 2015, New Bag at 04/09/23 2015    furosemide injection 80 mg, 80 mg, Intravenous, BID, Nate Robledo MD, 80 mg at 04/09/23 1714    glucagon (human recombinant) injection 1 mg, 1 mg, Intramuscular, PRN, Nate Robledo MD    hydrALAZINE injection 10 mg, 10 mg, Intravenous, Q4H PRN, Nate Robledo MD    insulin aspart U-100 injection 0-5 Units, 0-5 Units, Subcutaneous, Q6H PRN, Nate Robledo MD    melatonin tablet 6 mg, 6 mg, Oral, Nightly PRN, Nate Robledo MD    miconazole NITRATE 2 % top powder, , Topical (Top), BID, Nate Robledo MD, Given at 04/10/23 1200    mupirocin 2 % ointment, , Topical  (Top), BID, Nate Robledo MD, Given at 04/10/23 1200    nitroGLYCERIN 2% TD oint ointment 1 inch, 1 inch, Topical (Top), Q6H, ZEYNEP Cast, 1 inch at 04/10/23 1200    ondansetron injection 4 mg, 4 mg, Intravenous, Q4H PRN, Nate Robledo MD    piperacillin-tazobactam (ZOSYN) 4.5 g in dextrose 5 % in water (D5W) 5 % 100 mL IVPB (MB+), 4.5 g, Intravenous, Q8H, Nate Robledo MD, Last Rate: 25 mL/hr at 04/10/23 0930, 4.5 g at 04/10/23 0930    polyethylene glycol packet 17 g, 17 g, Oral, BID PRN, Nate Robledo MD    potassium chloride SA CR tablet 40 mEq, 40 mEq, Oral, Once, Monica Hendrickson, ZEYNEP    prochlorperazine injection Soln 5 mg, 5 mg, Intravenous, Q6H PRN, Nate Robledo MD    sacubitriL-valsartan 24-26 mg per tablet 1 tablet, 1 tablet, Oral, BID, Nate Robledo MD, 1 tablet at 04/09/23 2038    senna-docusate 8.6-50 mg per tablet 2 tablet, 2 tablet, Oral, BID PRN, Nate Robledo MD    sodium chloride 0.9% flush 10 mL, 10 mL, Intravenous, PRN, Nate Robledo MD    Flushing PICC Protocol, , , Until Discontinued **AND** sodium chloride 0.9% flush 10 mL, 10 mL, Intravenous, Q6H, 10 mL at 04/10/23 1200 **AND** sodium chloride 0.9% flush 10 mL, 10 mL, Intravenous, PRN, Nahid Sen MD    Pharmacy to dose Vancomycin consult, , , Once **AND** vancomycin - pharmacy to dose, , Intravenous, pharmacy to manage frequency, Alex Almeida MD    vancomycin in dextrose 5 % 1 gram/250 mL IVPB 1,000 mg, 1,000 mg, Intravenous, Q12H, Alex Almeida MD     sodium chloride, acetaminophen, acetaminophen, aluminum-magnesium hydroxide-simethicone, cloNIDine, dextrose 10%, dextrose 10%, glucagon (human recombinant), hydrALAZINE, insulin aspart U-100, melatonin, ondansetron, polyethylene glycol, prochlorperazine, senna-docusate 8.6-50 mg, sodium chloride 0.9%, Flushing PICC Protocol **AND** sodium chloride 0.9% **AND** sodium chloride 0.9%, Pharmacy to dose Vancomycin consult **AND** vancomycin - pharmacy to dose     Family History  "  Family history unknown: Yes        Review of Systems   Unable to perform ROS: Mental status change          Objective:   BP (!) 90/53   Pulse 80   Temp 97.2 °F (36.2 °C) (Axillary)   Resp 18   Ht 6' 0.01" (1.829 m)   Wt 85 kg (187 lb 6.3 oz)   SpO2 99%   BMI 25.41 kg/m²      Physical Exam  Constitutional:       General: He is not in acute distress.     Appearance: He is ill-appearing and toxic-appearing.   HENT:      Head: Normocephalic.      Right Ear: External ear normal.      Left Ear: External ear normal.      Nose: Nose normal.      Mouth/Throat:      Mouth: Mucous membranes are moist.      Pharynx: Oropharynx is clear.   Eyes:      Extraocular Movements: Extraocular movements intact.      Conjunctiva/sclera: Conjunctivae normal.      Pupils: Pupils are equal, round, and reactive to light.   Cardiovascular:      Rate and Rhythm: Normal rate and regular rhythm.      Pulses: Normal pulses.      Heart sounds: Normal heart sounds.   Pulmonary:      Effort: Pulmonary effort is normal.      Breath sounds: Normal breath sounds.   Abdominal:      General: Abdomen is flat. Bowel sounds are normal. There is no distension.      Palpations: Abdomen is soft.      Tenderness: There is no abdominal tenderness.   Musculoskeletal:      Right lower leg: No edema.      Left lower leg: No edema.   Skin:     General: Skin is warm.   Neurological:      General: No focal deficit present.      Mental Status: He is disoriented.          Review of Symptoms  Review of Symptoms      Symptom Assessment (ESAS 0-10 Scale)  Unable to complete assessment due to Mental status change     CAM / Delirium:  Positive  Constipation:  Negative  Diarrhea:  Negative      Bowel Management Plan (BMP):  Yes      Pain Assessment in Advanced Demential Scale (PAINAD)   Breathing - Independent of vocalization:  0  Negative vocalization:  0  Facial expression:  0  Body language:  0  Consolability:  0  Total:  0    Modified Chandler Scale:  0    Performance " Status:  20    Living Arrangements:  Lives alone and Lives in home    Psychosocial/Cultural:   See Palliative Psychosocial Note: No  **Primary  to Follow**  Palliative Care  Consult: No    Spiritual:  F - Ines and Belief:  Unable to obtain    Advance Care Planning   Advance Directives:     Decision Making:  Patient unable to communicate due to disease severity/cognitive impairment          > 50% of 35 min of encounter was spent in chart review, face to face discussion of goals of care, symptom assessment, coordination of care and emotional support.     Prince Valdez M.D.  Palliative Medicine  [facility]

## 2023-04-10 NOTE — PROGRESS NOTES
Pharmacokinetic Assessment Follow Up: IV Vancomycin    Vancomycin serum concentration assessment(s):    The random level was drawn correctly and can be used to guide therapy at this time. The measurement is above the desired definitive target range of 15 to 20 mcg/mL.    Vancomycin Regimen Plan:    Change regimen to Vancomycin 1000 mg IV every 12 hours with next serum trough concentration measured at 60 mins prior to 0800 dose on 04/12/23    Drug levels (last 3 results):  Recent Labs   Lab Result Units 04/10/23  0424   Vanc Lvl Random ug/ml 20.6*       Pharmacy will continue to follow and monitor vancomycin.    Please contact pharmacy at extension [phone] for questions regarding this assessment.    Thank you for the consult,   Marge Royal       Patient brief summary:  Darren Patel is a 67 y.o. male initiated on antimicrobial therapy with IV Vancomycin for treatment of sepsis    The patient's current regimen is Vancomycin 1000mg q12h    Drug Allergies:   Review of patient's allergies indicates:  No Known Allergies    Actual Body Weight:   85 kg    Renal Function:   Estimated Creatinine Clearance: 64.8 mL/min (based on SCr of 1.18 mg/dL).,     Dialysis Method (if applicable):  N/A    CBC (last 72 hours):  Recent Labs   Lab Result Units 04/07/23  1305 04/08/23  0431 04/09/23  0519 04/10/23  0424   WBC x10(3)/mcL 12.3* 8.7 10.5 11.1   Hgb g/dL 12.2* 13.1* 11.4* 11.3*   Hemoglobin A1c %  --  10.7*  --   --    Hct % 37.9* 42.2 35.4* 36.2*   Platelet x10(3)/mcL 57* 52* 40* 75*   Mono % % 8.8  --   --  5.5   Monocyte Man %  --  1 2  --    Eos % % 0.0  --   --  0.4   Basophil % % 0.2  --   --  0.5       Metabolic Panel (last 72 hours):  Recent Labs   Lab Result Units 04/07/23  1305 04/08/23  0352 04/08/23  0431 04/09/23  0519 04/09/23  1418 04/10/23  0424   Sodium Level mmol/L 146*  --  149* 154*  --  156*   Urine Sodium mmol/L  --  106.0  --   --   --   --    Potassium Level mmol/L 4.3  --  4.1 2.8*  --  2.5*   Chloride  mmol/L 115*  --  114* 109*  --  105   Carbon Dioxide mmol/L 21*  --  22* 31  --  40*   Glucose Level mg/dL 182*  --  150* 132*  --  191*   Glucose, UA mg/dL  --  Negative  --   --   --   --    Blood Urea Nitrogen mg/dL 48.7*  --  48.9* 43.2*  --  39.3*   Creatinine mg/dL 1.32*  --  1.06 1.17 1.39* 1.18   Urine Creatinine mg/dL  --  5.7*  --   --   --   --    Albumin Level g/dL 2.2*  --  2.3* 2.1* 2.2* 2.1*   Bilirubin Total mg/dL 4.4*  --  5.6* 6.5* 6.9* 6.4*   Alkaline Phosphatase unit/L 92  --  96 91 100 92   Aspartate Aminotransferase unit/L 100*  --  80* 73* 90* 95*   Alanine Aminotransferase unit/L 176*  --  167* 128* 128* 105*   Magnesium Level mg/dL  --   --  2.10 1.90  --  2.00   Phosphorus Level mg/dL  --   --  4.0  --   --   --        Vancomycin Administrations:  vancomycin given in the last 96 hours                     vancomycin (VANCOCIN) 1,750 mg in dextrose 5 % (D5W) 500 mL IVPB (mg) 1,750 mg New Bag 04/09/23 2015    vancomycin in dextrose 5 % 1 gram/250 mL IVPB 2,000 mg (mg) 2,000 mg New Bag 04/07/23 1613                    Microbiologic Results:  Microbiology Results (last 7 days)       ** No results found for the last 168 hours. **

## 2023-04-10 NOTE — NURSING
Subjective:      Patient ID: Darren Patel is a 67 y.o. male.    Chief Complaint: Fall (Pt reports per aasi with reports of being seen through the window on the floor of his home completely naked. Pt reports fall yesterday at some point. No obvious injuries, but pt does c/o R hip and leg pain. States he was weak, causing him to fall. On plavix. Also has pressure con on face from laying on floor. Hx DM, pacemaker. .)    HPI  Review of Systems   Objective:     Physical Exam   Assessment:     1. Paroxysmal atrial fibrillation    2. Fall    3. Left ventricular thrombus    4. Pallor    5. Pallor of extremity    6. Generalized weakness    7. Anasarca    8. Bilateral pleural effusion    9. Elevated liver enzymes    10. Sepsis    11. Chest pain    12. Syncope           Altered Skin Integrity 04/08/23 0450 medial Scrotum #1 Ulceration (Active)   04/08/23 0450   Altered Skin Integrity Present on Admission - Did Patient arrive to the hospital with altered skin?: yes   Side:    Orientation: medial   Location: Scrotum   Wound Number: #1   Is this injury device related?: No   Primary Wound Type: Ulceration   Description of Altered Skin Integrity:    Ankle-Brachial Index:    Pulses:    Removal Indication and Assessment:    Wound Outcome:    (Retired) Wound Length (cm):    (Retired) Wound Width (cm):    (Retired) Depth (cm):    Wound Description (Comments):    Removal Indications:    Wound Image   04/10/23 0900   Description of Altered Skin Integrity Partial thickness tissue loss. Shallow open ulcer with a red or pink wound bed, without slough. Intact or Open/Ruptured Serum-filled blister. 04/10/23 0900   Dressing Appearance Open to air 04/10/23 0900   Drainage Amount Scant 04/10/23 0900   Drainage Characteristics/Odor Serous;No odor 04/10/23 0900   Appearance Pink;Yellow;Moist 04/10/23 0900   Tissue loss description Partial thickness 04/10/23 0900   Red (%), Wound Tissue Color 50 % 04/10/23 0900   Yellow (%), Wound Tissue  Color 50 % 04/10/23 0900   Periwound Area Dry 04/10/23 0900   Wound Edges Irregular 04/10/23 0900   Wound Length (cm) 3 cm 04/10/23 0900   Wound Width (cm) 1.5 cm 04/10/23 0900   Wound Surface Area (cm^2) 4.5 cm^2 04/10/23 0900   Care Cleansed with:;Wound cleanser;Applied: 04/10/23 0900   Dressing Absorptive Pad 04/10/23 0900       Plan:        68 y/o found down in home with above hx noted.   Appears confused with where he is.  Hx of heart failure.    Consulted for lesion on scrotum-see photo.  Care initiated and orders to staff.   Understanding to turn fq2hrs  Will follow up in a few days.

## 2023-04-10 NOTE — PLAN OF CARE
Problem: Adult Inpatient Plan of Care  Goal: Plan of Care Review  Outcome: Ongoing, Progressing  Goal: Patient-Specific Goal (Individualized)  Outcome: Ongoing, Progressing  Goal: Absence of Hospital-Acquired Illness or Injury  Outcome: Ongoing, Progressing  Goal: Optimal Comfort and Wellbeing  Outcome: Ongoing, Progressing  Goal: Readiness for Transition of Care  Outcome: Ongoing, Progressing     Problem: Diabetes Comorbidity  Goal: Blood Glucose Level Within Targeted Range  Outcome: Ongoing, Progressing     Problem: Skin Injury Risk Increased  Goal: Skin Health and Integrity  Outcome: Ongoing, Progressing     Problem: Impaired Wound Healing  Goal: Optimal Wound Healing  Outcome: Ongoing, Progressing     Problem: Infection  Goal: Absence of Infection Signs and Symptoms  Outcome: Ongoing, Progressing

## 2023-04-10 NOTE — PROGRESS NOTES
[facility] - [facility] Medical Telemetry  Cardiology  Progress Note    Patient Name: Darren Patel  MRN: [id number]  Admission Date: 4/7/2023  Hospital Length of Stay: 3 days  Code Status: Full Code   Attending Provider: Nate Robledo MD   Consulting Provider: ZEYNEP Cast  Primary Care Physician: ZEYNEP Rosales  Principal Problem:Acute on chronic HFrEF (heart failure with reduced ejection fraction)    Patient information was obtained from past medical records and ER records.     Subjective:     Chief Complaint:  AMS    HPI:   Mr. Patel is  a 68y/o male, known to [facility] cardiology, with PMHx significant for ICMO, PAfib, HLD, CHB, HTN, CAD, T2DM, and PAD who presented to ED via EMS after being found naked on the floor. Patient reporting falling the day prior and unable to get up. He has c/o generalized weakness, right hip and right sided abdominal pain. Denies CP or SOB. CT head negative. CT abdomen/pelvis revealing anasarca with small bilateral pleural effusions, small ascites, cardiomegaly, filling defect in LV apex highly suspicious for thrombus and severe aortoiliac atherosclerosis. Echo revealing EF 15%, grade I LVDD, large fixed LV apical thrombus and mobile echodensity on RV pacer lead. BLE Venous US negative DVT. BLE arterial Us consistent with bilateral severe arterial flow reduction. Labs significant for SBC 12.3, H/H 12.2/37.9, Plt 57, INR 2.06, Na 146, Bun/creatinine 48.7/1.32, AST//176, BNP 3249, , Troponin 1.266-1.061-1.289. He was treated with 1L NS, IV ABX and admitted to hospitalist. [facility] has been consulted for NSTEMI.   **call by nurse this am regarding pale cool LLE with absent infrapopliteal pulses    Hospital Course:  4.08.23: Plts declining. Remains confused. CT head negative for acute intracranial abnormality. CTA aorta with runoff noted.   4.09.23: Remains confused. Plts 40. No evidence of bleeding.Left foot discoloration to left big toe. Foot cold with out  pulses  4.10.23: Patient given plts yesterday and started on Angiomax drip. He had GIB overnight and Angiomax turned off. Plts 75 today. Left foot is cold and pulseless.     PMH: ICMO, PAfib, HLD, CHB, HTN, CAD, T2DM,   PSH: LHC, Peripheral angiogram. 3V CABG '21  Family History: unknown  Social History: current smoker; hx cocaine use    Previous Cardiac Diagnostics:   Carotid US 04.09.23:  Abnormal study.  The left common, external and internal carotid arteries are occluded.  The right internal carotid artery demonstrated less than 50% stenosis.  Bilateral vertebral arteries were patent with antegrade flow.    TTE 04.07.23:  The left ventricle is mildly enlarged with eccentric hypertrophy and severely decreased systolic function. The estimated ejection fraction is 15%. Grade I left ventricular diastolic dysfunction. Severe left atrial enlargement. Mild mitral regurgitation. Elevated central venous pressure (15 mmHg). The estimated PA systolic pressure is 39 mmHg. Mobile echodensity noted on pacer lead. A large left ventricular thrombus is present. The thrombus is fixed and located in the apex. Mild tricuspid regurgitation.    Venous US BLE 04.07.23:  There was no evidence of deep or superficial vein thrombosis in the bilateral lower extremities.    Arterial US BLE 04.07.23:  The right lower extremity demonstrated severe arterial flow reduction consistent with inflow disease and monophasic waveforms throughout.    Trickle flow was seen in the posterior tibial artery of the right lower extremity.    The left lower extremity demonstrated severe arterial flow reduction primarily in the popliteal and tibial arteries consistent with monophasic waveforms.      Right Peripheral angiogram 5/20/22:  Occlusion of distal popliteal artery, anterior tibial artery and severe stenosis in Posterior tibial and peroneal artery    Right peripheral angiogram 05/05/2022:  Stenting of right SFA, right profundus, and right external iliac  artery      Review of patient's allergies indicates:  No Known Allergies    Review of Systems   Unable to perform ROS: Mental status change   Respiratory:  Negative for shortness of breath.    Musculoskeletal:         LLE pain   Psychiatric/Behavioral:  Positive for confusion.      Objective:     Vital Signs (Most Recent):  Temp: 96.7 °F (35.9 °C) (04/10/23 1129)  Pulse: 80 (04/10/23 1129)  Resp: 18 (04/10/23 1129)  BP: (!) 99/55 (04/10/23 1129)  SpO2: 100 % (04/10/23 1129) Vital Signs (24h Range):  Temp:  [96.7 °F (35.9 °C)-98.8 °F (37.1 °C)] 96.7 °F (35.9 °C)  Pulse:  [80-81] 80  Resp:  [18-20] 18  SpO2:  [90 %-100 %] 100 %  BP: ()/(52-72) 99/55     Weight: 85 kg (187 lb 6.3 oz)  Body mass index is 25.41 kg/m².    SpO2: 100 %         Intake/Output Summary (Last 24 hours) at 4/10/2023 1158  Last data filed at 4/10/2023 0705  Gross per 24 hour   Intake --   Output 6390 ml   Net -6390 ml         Lines/Drains/Airways       Peripherally Inserted Central Catheter Line  Duration             PICC Double Lumen 04/08/23 1236 right basilic 1 day              Peripheral Intravenous Line  Duration                  Peripheral IV - Single Lumen 04/10/23 0100 22 G Anterior;Proximal;Right Forearm <1 day                    Significant Labs:  Recent Results (from the past 72 hour(s))   Comprehensive metabolic panel    Collection Time: 04/07/23  1:05 PM   Result Value Ref Range    Sodium Level 146 (H) 136 - 145 mmol/L    Potassium Level 4.3 3.5 - 5.1 mmol/L    Chloride 115 (H) 98 - 107 mmol/L    Carbon Dioxide 21 (L) 23 - 31 mmol/L    Glucose Level 182 (H) 82 - 115 mg/dL    Blood Urea Nitrogen 48.7 (H) 8.4 - 25.7 mg/dL    Creatinine 1.32 (H) 0.73 - 1.18 mg/dL    Calcium Level Total 8.6 (L) 8.8 - 10.0 mg/dL    Protein Total 5.0 (L) 5.8 - 7.6 gm/dL    Albumin Level 2.2 (L) 3.4 - 4.8 g/dL    Globulin 2.8 2.4 - 3.5 gm/dL    Albumin/Globulin Ratio 0.8 (L) 1.1 - 2.0 ratio    Bilirubin Total 4.4 (H) <=1.5 mg/dL    Alkaline Phosphatase  92 40 - 150 unit/L    Alanine Aminotransferase 176 (H) 0 - 55 unit/L    Aspartate Aminotransferase 100 (H) 5 - 34 unit/L    eGFR 58 mls/min/1.73/m2   Troponin I    Collection Time: 04/07/23  1:05 PM   Result Value Ref Range    Troponin-I 1.266 (H) 0.000 - 0.045 ng/mL   APTT    Collection Time: 04/07/23  1:05 PM   Result Value Ref Range    PTT 37.0 (H) 23.2 - 33.7 seconds   Protime-INR    Collection Time: 04/07/23  1:05 PM   Result Value Ref Range    PT 22.8 (H) 12.5 - 14.5 seconds    INR 2.06 (H) 0.00 - 1.30   CPK    Collection Time: 04/07/23  1:05 PM   Result Value Ref Range    Creatine Kinase 608 (H) 30 - 200 U/L   CBC with Differential    Collection Time: 04/07/23  1:05 PM   Result Value Ref Range    WBC 12.3 (H) 4.5 - 11.5 x10(3)/mcL    RBC 4.18 (L) 4.70 - 6.10 x10(6)/mcL    Hgb 12.2 (L) 14.0 - 18.0 g/dL    Hct 37.9 (L) 42.0 - 52.0 %    MCV 90.7 80.0 - 94.0 fL    MCH 29.2 27.0 - 31.0 pg    MCHC 32.2 (L) 33.0 - 36.0 g/dL    RDW 20.7 (H) 11.5 - 17.0 %    Platelet 57 (L) 130 - 400 x10(3)/mcL    MPV      Neut % 86.1 %    Lymph % 4.7 %    Mono % 8.8 %    Eos % 0.0 %    Basophil % 0.2 %    Lymph # 0.58 (L) 0.6 - 4.6 x10(3)/mcL    Neut # 10.58 (H) 2.1 - 9.2 x10(3)/mcL    Mono # 1.08 0.1 - 1.3 x10(3)/mcL    Eos # 0.00 0 - 0.9 x10(3)/mcL    Baso # 0.02 0 - 0.2 x10(3)/mcL    IG# 0.03 0 - 0.04 x10(3)/mcL    IG% 0.2 %    NRBC% 0.7 %   Lipase    Collection Time: 04/07/23  1:05 PM   Result Value Ref Range    Lipase Level 27 <=60 U/L   Echo    Collection Time: 04/07/23  3:11 PM   Result Value Ref Range    BSA 2.17 m2    TDI SEPTAL 0.05 m/s    LV LATERAL E/E' RATIO 12.00 m/s    LV SEPTAL E/E' RATIO 14.40 m/s    Right Atrial Pressure (from IVC) 15 mmHg    EF 15 %    Left Ventricular Outflow Tract Mean Velocity 0.49 cm/s    Left Ventricular Outflow Tract Mean Gradient 1.00 mmHg    TDI LATERAL 0.06 m/s    LVIDd 6.47 (A) 3.5 - 6.0 cm    IVS 1.11 (A) 0.6 - 1.1 cm    Posterior Wall 0.92 0.6 - 1.1 cm    LVIDs 6.07 (A) 2.1 - 4.0 cm     FS 6 28 - 44 %    Sinus 4.00 cm    LV mass 286.21 g    LA size 5.60 cm    Left Ventricle Relative Wall Thickness 0.28 cm    AV mean gradient 3 mmHg    AV valve area 1.71 cm2    AV Velocity Ratio 0.68     AV index (prosthetic) 0.49     MV mean gradient 2 mmHg    MV valve area p 1/2 method 3.33 cm2    MV valve area by continuity eq 1.90 cm2    E/A ratio 1.53     Mean e' 0.06 m/s    E wave deceleration time 140.00 msec    LVOT diameter 2.10 cm    LVOT area 3.5 cm2    LVOT peak mateo 0.80 m/s    LVOT peak VTI 11.40 cm    Ao peak mateo 1.17 m/s    Ao VTI 23.1 cm    LVOT stroke volume 39.47 cm3    AV peak gradient 5 mmHg    MV peak gradient 4 mmHg    TV rest pulmonary artery pressure 39 mmHg    E/E' ratio 13.09 m/s    MV Peak E Mateo 0.72 m/s    TR Max Mateo 2.47 m/s    MV VTI 20.8 cm    MV stenosis pressure 1/2 time 66.00 ms    MV Peak A Mateo 0.47 m/s    LV Systolic Volume 185.00 mL    LV Systolic Volume Index 86.0 mL/m2    LV Diastolic Volume 214.00 mL    LV Diastolic Volume Index 99.53 mL/m2    LV Mass Index 133 g/m2    Triscuspid Valve Regurgitation Peak Gradient 24 mmHg    LA Volume Index (Mod) 47.9 mL/m2    LA volume (mod) 103.00 cm3   Troponin I    Collection Time: 04/07/23  6:58 PM   Result Value Ref Range    Troponin-I 1.061 (H) 0.000 - 0.045 ng/mL   BNP    Collection Time: 04/07/23 10:21 PM   Result Value Ref Range    Natriuretic Peptide 3,249.1 (H) <=100.0 pg/mL   Lactate Dehydrogenase    Collection Time: 04/07/23 10:21 PM   Result Value Ref Range    Lactate Dehydrogenase 475 (H) 125 - 220 U/L   Haptoglobin    Collection Time: 04/07/23 10:21 PM   Result Value Ref Range    Haptoglobin 77 40 - 368 mg/dL   Path Review, Peripheral Smear    Collection Time: 04/07/23 10:21 PM   Result Value Ref Range    Peripheral Smear Evaluation       - Normocytic, normochromic anemia with appropriate polychromasia and no schistocytes.  - Thrombocytopenia.    Impression: Normocytic anemia can be seen in early iron deficiency anemia, anemia  of chronic disease and acute blood loss. Thrombocytopenia can be due to decreased production, increased destruction (immune and non-immune mediated) or due to splenic sequestration.    Francisco Malave M.D.      Fibrinogen    Collection Time: 04/07/23 10:21 PM   Result Value Ref Range    Fibrinogen 194.0 (L) 210.0 - 463.0 mg/dL   Troponin I    Collection Time: 04/07/23 10:21 PM   Result Value Ref Range    Troponin-I 1.289 (H) 0.000 - 0.045 ng/mL   Urinalysis, Reflex to Urine Culture    Collection Time: 04/08/23  3:52 AM    Specimen: Urine   Result Value Ref Range    Color, UA Yellow Yellow, Light-Yellow, Dark Yellow, Aletha, Straw    Appearance, UA Clear Clear    Specific Gravity, UA 1.008 1.005 - 1.030    pH, UA 5.0 5.0 - 8.5    Protein, UA Negative Negative mg/dL    Glucose, UA Negative Negative, Normal mg/dL    Ketones, UA Negative Negative mg/dL    Blood, UA Negative Negative unit/L    Bilirubin, UA Negative Negative mg/dL    Urobilinogen, UA 0.2 0.2, 1.0, Normal mg/dL    Nitrites, UA Negative Negative    Leukocyte Esterase, UA Trace (A) Negative unit/L   Protein/Creatinine Ratio, Urine    Collection Time: 04/08/23  3:52 AM   Result Value Ref Range    Urine Protein Level <6.8 mg/dL    Urine Creatinine 5.7 (L) 63.0 - 166.0 mg/dL   Sodium, Random Urine    Collection Time: 04/08/23  3:52 AM   Result Value Ref Range    Urine Sodium 106.0 mmol/L   Urea Nitrogen, Random Urine    Collection Time: 04/08/23  3:52 AM   Result Value Ref Range    Urine Urea Nitrogen 128.0 mg/dL   Urinalysis, Microscopic    Collection Time: 04/08/23  3:52 AM   Result Value Ref Range    RBC, UA <5 <=5 /HPF    WBC, UA <5 <=5 /HPF    Squamous Epithelial Cells, UA <5 <=5 /HPF    Bacteria, UA None Seen None Seen, Rare, Occasional /HPF   Comprehensive Metabolic Panel    Collection Time: 04/08/23  4:31 AM   Result Value Ref Range    Sodium Level 149 (H) 136 - 145 mmol/L    Potassium Level 4.1 3.5 - 5.1 mmol/L    Chloride 114 (H) 98 - 107 mmol/L     Carbon Dioxide 22 (L) 23 - 31 mmol/L    Glucose Level 150 (H) 82 - 115 mg/dL    Blood Urea Nitrogen 48.9 (H) 8.4 - 25.7 mg/dL    Creatinine 1.06 0.73 - 1.18 mg/dL    Calcium Level Total 8.9 8.8 - 10.0 mg/dL    Protein Total 5.2 (L) 5.8 - 7.6 gm/dL    Albumin Level 2.3 (L) 3.4 - 4.8 g/dL    Globulin 2.9 2.4 - 3.5 gm/dL    Albumin/Globulin Ratio 0.8 (L) 1.1 - 2.0 ratio    Bilirubin Total 5.6 (H) <=1.5 mg/dL    Alkaline Phosphatase 96 40 - 150 unit/L    Alanine Aminotransferase 167 (H) 0 - 55 unit/L    Aspartate Aminotransferase 80 (H) 5 - 34 unit/L    eGFR >60 mls/min/1.73/m2   Magnesium    Collection Time: 04/08/23  4:31 AM   Result Value Ref Range    Magnesium Level 2.10 1.60 - 2.60 mg/dL   Phosphorus    Collection Time: 04/08/23  4:31 AM   Result Value Ref Range    Phosphorus Level 4.0 2.3 - 4.7 mg/dL   Hemoglobin A1c if not done in past 3 months    Collection Time: 04/08/23  4:31 AM   Result Value Ref Range    Hemoglobin A1c 10.7 (H) <=7.0 %    Estimated Average Glucose 260.4 mg/dL   CBC with Differential    Collection Time: 04/08/23  4:31 AM   Result Value Ref Range    WBC 8.7 4.5 - 11.5 x10(3)/mcL    RBC 4.56 (L) 4.70 - 6.10 x10(6)/mcL    Hgb 13.1 (L) 14.0 - 18.0 g/dL    Hct 42.2 42.0 - 52.0 %    MCV 92.5 80.0 - 94.0 fL    MCH 28.7 27.0 - 31.0 pg    MCHC 31.0 (L) 33.0 - 36.0 g/dL    RDW 21.1 (H) 11.5 - 17.0 %    Platelet 52 (L) 130 - 400 x10(3)/mcL    MPV      NRBC% 1.4 %   Manual Differential    Collection Time: 04/08/23  4:31 AM   Result Value Ref Range    Neut Man 97 %    Monocyte Man 1 %    [place] Man 1 %    Myelo Man 2 %    Instr WBC 8.7 x10(3)/mcL    Abs Mono 0.087 (L) 0.1 - 1.3 x10(3)/mcL    Abs Neut 8.7 2.1 - 9.2 x10(3)/mcL    NRBC Man 5 %    RBC Morph Abnormal (A) Normal    Anisocyte 1+ (A) (none)    Poik 3+ (A) (none)    Macrocyte 2+ (A) (none)    Target Cell 1+ (A) (none)    Marilee Cells 2+ (A) (none)    Platelet Est Decreased (A) Normal, Adequate   Bilirubin, Direct    Collection Time: 04/08/23  4:31 AM    Result Value Ref Range    Bilirubin Direct 3.4 (H) 0.0 - <0.5 mg/dL   Vitamin B12    Collection Time: 04/08/23  4:31 AM   Result Value Ref Range    Vitamin B12 Level >2,000 (H) 213 - 816 pg/mL   Drug Screen, Urine    Collection Time: 04/08/23  2:11 PM   Result Value Ref Range    Amphetamines, Urine Negative Negative    Barbituates, Urine Negative Negative    Benzodiazepine, Urine Negative Negative    Cannabinoids, Urine Negative Negative    Cocaine, Urine Negative Negative    Fentanyl, Urine Negative Negative    MDMA, Urine Negative Negative    Opiates, Urine Negative Negative    Phencyclidine, Urine Negative Negative    pH, Urine 5.5 3.0 - 11.0   POCT glucose    Collection Time: 04/09/23  4:59 AM   Result Value Ref Range    POCT Glucose 130 (H) 70 - 110 mg/dL   Comprehensive Metabolic Panel    Collection Time: 04/09/23  5:19 AM   Result Value Ref Range    Sodium Level 154 (H) 136 - 145 mmol/L    Potassium Level 2.8 (L) 3.5 - 5.1 mmol/L    Chloride 109 (H) 98 - 107 mmol/L    Carbon Dioxide 31 23 - 31 mmol/L    Glucose Level 132 (H) 82 - 115 mg/dL    Blood Urea Nitrogen 43.2 (H) 8.4 - 25.7 mg/dL    Creatinine 1.17 0.73 - 1.18 mg/dL    Calcium Level Total 8.4 (L) 8.8 - 10.0 mg/dL    Protein Total 4.9 (L) 5.8 - 7.6 gm/dL    Albumin Level 2.1 (L) 3.4 - 4.8 g/dL    Globulin 2.8 2.4 - 3.5 gm/dL    Albumin/Globulin Ratio 0.8 (L) 1.1 - 2.0 ratio    Bilirubin Total 6.5 (H) <=1.5 mg/dL    Alkaline Phosphatase 91 40 - 150 unit/L    Alanine Aminotransferase 128 (H) 0 - 55 unit/L    Aspartate Aminotransferase 73 (H) 5 - 34 unit/L    eGFR >60 mls/min/1.73/m2   Magnesium    Collection Time: 04/09/23  5:19 AM   Result Value Ref Range    Magnesium Level 1.90 1.60 - 2.60 mg/dL   CBC with Differential    Collection Time: 04/09/23  5:19 AM   Result Value Ref Range    WBC 10.5 4.5 - 11.5 x10(3)/mcL    RBC 4.07 (L) 4.70 - 6.10 x10(6)/mcL    Hgb 11.4 (L) 14.0 - 18.0 g/dL    Hct 35.4 (L) 42.0 - 52.0 %    MCV 87.0 80.0 - 94.0 fL    MCH  28.0 27.0 - 31.0 pg    MCHC 32.2 (L) 33.0 - 36.0 g/dL    RDW 21.2 (H) 11.5 - 17.0 %    Platelet 40 (L) 130 - 400 x10(3)/mcL    MPV      NRBC% 1.2 %   Manual Differential    Collection Time: 04/09/23  5:19 AM   Result Value Ref Range    Neut Man 91 %    Lymph Man 4 %    Monocyte Man 2 %    Combs Man 3 %    Instr WBC 10.5 x10(3)/mcL    Abs Mono 0.21 0.1 - 1.3 x10(3)/mcL    Abs Lymp 0.42 (L) 0.6 - 4.6 x10(3)/mcL    Abs Neut 9.87 (H) 2.1 - 9.2 x10(3)/mcL    NRBC Man 4 %    Polychrom 1+ (A) (none)    RBC Morph Abnormal (A) Normal    Anisocyte 2+ (A) (none)    Poik 1+ (A) (none)    Macrocyte 1+ (A) (none)    Target Cell 2+ (A) (none)    Marilee Cells 1+ (A) (none)    Platelet Est Decreased (A) Normal, Adequate    Giant Platelets 1+    CV Ultrasound Bilateral Doppler Carotid    Collection Time: 04/09/23  9:32 AM   Result Value Ref Range    Right ICA/CCA ratio 0.53     Left Highest ICA 0.00     Left Highest CCA 0     Right Highest ICA 66.00     Right Highest      Right Highest EDV 17.00     Right CCA prox sys 114 cm/s    Right CCA dist sys 124 cm/s    Right ICA prox sys 66 cm/s    Right ICA prox major 16 cm/s    Right ICA mid sys 42 cm/s    Right ICA mid major 12 cm/s    Right ICA dist sys 48 cm/s    Right ICA dist major 17 cm/s    Right ECA sys 151 cm/s    Right vertebral sys 34 cm/s    Left CCA prox sys 0 cm/s    Left CCA dist sys 0 cm/s    Left ICA prox sys 0 cm/s    Left ICA mid sys 0 cm/s    Left ICA dist sys 0 cm/s    Left ECA sys 0 cm/s    Left vertebral sys 106 cm/s   POCT glucose    Collection Time: 04/09/23 11:34 AM   Result Value Ref Range    POCT Glucose 152 (H) 70 - 110 mg/dL   Creatinine, Serum    Collection Time: 04/09/23  2:18 PM   Result Value Ref Range    Creatinine 1.39 (H) 0.73 - 1.18 mg/dL    eGFR 55 mls/min/1.73/m2   Hepatic function panel    Collection Time: 04/09/23  2:18 PM   Result Value Ref Range    Albumin Level 2.2 (L) 3.4 - 4.8 g/dL    Alkaline Phosphatase 100 40 - 150 unit/L    Alanine  Aminotransferase 128 (H) 0 - 55 unit/L    Aspartate Aminotransferase 90 (H) 5 - 34 unit/L    Bilirubin Direct 4.7 (H) 0.0 - <0.5 mg/dL    Bilirubin Indirect 2.20 (H) 0.00 - 0.80 mg/dL    Bilirubin Total 6.9 (H) <=1.5 mg/dL    Protein Total 5.3 (L) 5.8 - 7.6 gm/dL   APTT    Collection Time: 04/09/23  2:18 PM   Result Value Ref Range    PTT 30.9 23.2 - 33.7 seconds   Fibrinogen    Collection Time: 04/09/23  2:18 PM   Result Value Ref Range    Fibrinogen 361.0 210.0 - 463.0 mg/dL   Thrombin Time    Collection Time: 04/09/23  2:18 PM   Result Value Ref Range    Thrombin Time 18 0 - 22 seconds   Pathologist Interpretation    Collection Time: 04/09/23  2:18 PM   Result Value Ref Range    Pathology Review       Chemical abnormalities suggesting hepatic parenchymal injury; no specific evidence of biliary disease.    Hyperbilirubinemia.    Hypoproteinemia and hypoalbuminemia.    Francisco Malave M.D.    POCT glucose    Collection Time: 04/09/23  5:35 PM   Result Value Ref Range    POCT Glucose 156 (H) 70 - 110 mg/dL   Prepare Platelets 1 Dose    Collection Time: 04/09/23  5:36 PM   Result Value Ref Range    UNIT NUMBER [id number]     UNIT ABO/RH A POS     DISPENSE STATUS Released     Unit Expiration [id number]     Product Code S0030J11     Unit Blood Type Code 6200     CROSSMATCH INTERPRETATION Not required     UNIT NUMBER [id number]     UNIT ABO/RH AB POS     DISPENSE STATUS Issued     Unit Expiration [id number]     Product Code J5019V92     Unit Blood Type Code 8400     CROSSMATCH INTERPRETATION Not required    Type & Screen    Collection Time: 04/09/23  5:36 PM   Result Value Ref Range    Group & Rh O POS     Indirect Ritesh GEL NEG     Specimen Outdate 04/12/2023 23:59    ABORH RETYPE    Collection Time: 04/09/23  6:00 PM   Result Value Ref Range    ABORH Retype O POS    POCT glucose    Collection Time: 04/09/23  8:27 PM   Result Value Ref Range    POCT Glucose 173 (H) 70 - 110 mg/dL   APTT    Collection  Time: 04/09/23  9:07 PM   Result Value Ref Range    PTT 54.5 (H) 23.2 - 33.7 seconds   Magnesium    Collection Time: 04/10/23  4:24 AM   Result Value Ref Range    Magnesium Level 2.00 1.60 - 2.60 mg/dL   Lactic Acid, Plasma    Collection Time: 04/10/23  4:24 AM   Result Value Ref Range    Lactic Acid Level 2.1 0.5 - 2.2 mmol/L   Comprehensive Metabolic Panel    Collection Time: 04/10/23  4:24 AM   Result Value Ref Range    Sodium Level 156 (H) 136 - 145 mmol/L    Potassium Level 2.5 (LL) 3.5 - 5.1 mmol/L    Chloride 105 98 - 107 mmol/L    Carbon Dioxide 40 (H) 23 - 31 mmol/L    Glucose Level 191 (H) 82 - 115 mg/dL    Blood Urea Nitrogen 39.3 (H) 8.4 - 25.7 mg/dL    Creatinine 1.18 0.73 - 1.18 mg/dL    Calcium Level Total 8.6 (L) 8.8 - 10.0 mg/dL    Protein Total 5.0 (L) 5.8 - 7.6 gm/dL    Albumin Level 2.1 (L) 3.4 - 4.8 g/dL    Globulin 2.9 2.4 - 3.5 gm/dL    Albumin/Globulin Ratio 0.7 (L) 1.1 - 2.0 ratio    Bilirubin Total 6.4 (H) <=1.5 mg/dL    Alkaline Phosphatase 92 40 - 150 unit/L    Alanine Aminotransferase 105 (H) 0 - 55 unit/L    Aspartate Aminotransferase 95 (H) 5 - 34 unit/L    eGFR >60 mls/min/1.73/m2   Vancomycin, Random    Collection Time: 04/10/23  4:24 AM   Result Value Ref Range    Vanc Lvl Random 20.6 (H) 15.0 - 20.0 ug/ml   APTT    Collection Time: 04/10/23  4:24 AM   Result Value Ref Range    PTT 65.6 (H) 23.2 - 33.7 seconds   CBC with Differential    Collection Time: 04/10/23  4:24 AM   Result Value Ref Range    WBC 11.1 4.5 - 11.5 x10(3)/mcL    RBC 3.94 (L) 4.70 - 6.10 x10(6)/mcL    Hgb 11.3 (L) 14.0 - 18.0 g/dL    Hct 36.2 (L) 42.0 - 52.0 %    MCV 91.9 80.0 - 94.0 fL    MCH 28.7 27.0 - 31.0 pg    MCHC 31.2 (L) 33.0 - 36.0 g/dL    RDW 21.5 (H) 11.5 - 17.0 %    Platelet 75 (L) 130 - 400 x10(3)/mcL    MPV 11.3 (H) 7.4 - 10.4 fL    Neut % 82.9 %    Lymph % 9.1 %    Mono % 5.5 %    Eos % 0.4 %    Basophil % 0.5 %    Lymph # 1.01 0.6 - 4.6 x10(3)/mcL    Neut # 9.17 2.1 - 9.2 x10(3)/mcL    Mono # 0.61  0.1 - 1.3 x10(3)/mcL    Eos # 0.04 0 - 0.9 x10(3)/mcL    Baso # 0.05 0 - 0.2 x10(3)/mcL    IG# 0.18 (H) 0 - 0.04 x10(3)/mcL    IG% 1.6 %    NRBC% 0.5 %   Lactic Acid, Plasma    Collection Time: 04/10/23  6:24 AM   Result Value Ref Range    Lactic Acid Level 2.2 0.5 - 2.2 mmol/L   POCT glucose    Collection Time: 04/10/23  6:31 AM   Result Value Ref Range    POCT Glucose 209 (H) 70 - 110 mg/dL   Lactic Acid, Plasma    Collection Time: 04/10/23  8:26 AM   Result Value Ref Range    Lactic Acid Level 2.2 0.5 - 2.2 mmol/L   Lactic Acid, Plasma    Collection Time: 04/10/23 10:34 AM   Result Value Ref Range    Lactic Acid Level 2.0 0.5 - 2.2 mmol/L       Significant Imaging:  Imaging Results              US Abdomen Limited (Final result)  Result time 04/08/23 09:35:53      Final result by Noel Yoon MD (04/08/23 09:35:53)                   Impression:    Impression:    1. The gallbladder appears full but non-distended. The gallbladder wall measures 3.5 mm in thickness. The sonographic [place] sign is positive. There are two tiny echogenic foci along the gallbladder wall, which measure 9 x 5 x 5 mm and 7 x 3 x 6 mm, which may reflect polyps. Correlate with clinical and laboratory findings as regards additional evaluation and follow-up as the above findings could reflect cholecystitis although the presence of significant ascites decreases sensitivity and specificity.    2. There is mild right hydronephrosis.    3. There is small right pleural effusion. There is mild ascites.    4. Mild hepatomegaly with diffuse fatty infiltration.    5. Details and other findings as noted above.    No significant discrepancy with overnight report.      Electronically signed by: Noel Yoon  Date:    04/08/2023  Time:    09:35               Narrative:      Technique:Limited right upper quadrant abdominal ultrasound was performed.    Comparison: CT abdomen pelvis April 7, 2023 none.    Clinical History:Cholecystitis.    Findings:There is  small right pleural effusion. There is mild ascites.    Liver:The liver is enlarged and measures 20 centimeters in greatest dimension and demonstrates increased echogenicity which may reflect fatty infiltration.    Biliary ducts:The common bile duct is within normal limits at 2.4 mm in diameter.    Gallbladder:The gallbladder appears full but non-distended. The gallbladder wall measures 3.5 mm in thickness. There is no pericholecystic fluid. The sonographic [place] sign is positive. There are two tiny echogenic foci along the gallbladder wall, which measure 9 x 5 x 5 mm and 7 x 3 x 6 mm, which may reflect polyps.    Pancreas:The pancreas cannot be definitively evaluated due to obscuration by bowel gas.    Kidneys:    Right kidney:There is mild right hydronephrosis.    Dimensions:The right kidney measures 10.3 x 5.2 x 7.5 cm.    Vascular:    Portal vein:Flow parameters are within normal limits with hepatopetal flow.    IVC:The IVC is within normal limits.                        Preliminary result by Noel Yoon MD (04/07/23 23:06:34)                   Narrative:    START OF REPORT:  Technique: Limited right upper quadrant abdominal ultrasound was performed.    Comparison: None.    Clinical History: Cholecystitis.    Findings: There is small right pleural effusion. There is mild ascites.  Liver: The liver is mildly enlarged and measures 20 centimeters in greatest dimension and demonstrates increased echogenicity which may reflect fatty infiltration.  Biliary ducts: The common bile duct is within normal limits at 2.4 mm in diameter.  Gallbladder: The gallbladder appears full but non-distended. The gallbladder wall measures 3.5 mm in thickness. There is no pericholecystic fluid. The sonographic [place] sign is positive. There are two tiny echogenic foci along the gallbladder wall, which measure 9 x 5 x 5 mm and 7 x 3 x 6 mm, which may reflect polyps.  Pancreas: The pancreas cannot be definitively evaluated due to  obscuration by bowel gas.  Kidneys:  Right kidney: There is mild right hydronephrosis.  Dimensions: The right kidney measures 10.3 x 5.2 x 7.5 cm.  Vascular:  Portal vein: Flow parameters are within normal limits with hepatopetal flow.  IVC: The IVC is within normal limits.      Impression:  1. The gallbladder appears full but non-distended. The gallbladder wall measures 3.5 mm in thickness. The sonographic Draper sign is positive. There are two tiny echogenic foci along the gallbladder wall, which measure 9 x 5 x 5 mm and 7 x 3 x 6 mm, which may reflect polyps. Correlate with clinical and laboratory findings as regards additional evaluation and follow-up as the above findings could reflect cholecystitis although the presence of significant ascites decreases sensitivity and specificity.  2. There is mild right hydronephrosis.  3. There is small right pleural effusion. There is mild ascites.  4. Mild hepatomegaly with diffuse fatty infiltration.  5. Details and other findings as noted above.                          Preliminary result by David Tomas MD (04/07/23 23:06:34)                   Narrative:    START OF REPORT:  Technique: Limited right upper quadrant abdominal ultrasound was performed.    Comparison: None.    Clinical History: Cholecystitis.    Findings: There is small right pleural effusion. There is mild ascites.  Liver: The liver is mildly enlarged and measures 20 centimeters in greatest dimension and demonstrates increased echogenicity which may reflect fatty infiltration.  Biliary ducts: The common bile duct is within normal limits at 2.4 mm in diameter.  Gallbladder: The gallbladder appears full but non-distended. The gallbladder wall measures 3.5 mm in thickness. There is no pericholecystic fluid. The sonographic Draper sign is positive. There are two tiny echogenic foci along the gallbladder wall, which measure 9 x 5 x 5 mm and 7 x 3 x 6 mm, which may reflect polyps.  Pancreas: The pancreas  cannot be definitively evaluated due to obscuration by bowel gas.  Kidneys:  Right kidney: There is mild right hydronephrosis.  Dimensions: The right kidney measures 10.3 x 5.2 x 7.5 cm.  Vascular:  Portal vein: Flow parameters are within normal limits with hepatopetal flow.  IVC: The IVC is within normal limits.      Impression:  1. The gallbladder appears full but non-distended. The gallbladder wall measures 3.5 mm in thickness. The sonographic [place]se[place] sign is positive. There are two tiny echogenic foci along the gallbladder wall, which measure 9 x 5 x 5 mm and 7 x 3 x 6 mm, which may reflect polyps. Correlate with clinical and laboratory findings as regards additional evaluation and follow-up as the above findings could reflect cholecystitis although the presence of significant ascites decreases sensitivity and specificity.  2. There is mild right hydronephrosis.  3. There is small right pleural effusion. There is mild ascites.  4. Mild hepatomegaly with diffuse fatty infiltration.  5. Details and other findings as noted above.                                         CT Head Without Contrast (Final result)  Result time 04/07/23 14:00:32      Final result by Dong Grant MD (04/07/23 14:00:32)                   Narrative:    EXAMINATION  CT HEAD WITHOUT CONTRAST    CLINICAL HISTORY  Head trauma, moderate-severe;trauma;    TECHNIQUE  Axial non-contrast CT images of the head were acquired and multiplanar reconstructions accomplished by a CT technologist at a separate workstation, pushed to PACS for physician review.    COMPARISON  None available at the time of the initial interpretation.    FINDINGS  Images were reviewed in subdural, brain, soft tissue, and bone windows.    Exam quality: Motion/streak artifact limits assessment of the posterior fossa.    Hemorrhage: No evidence of acute hyperattenuating blood products.    Parenchyma: There is diffuse bilateral supratentorial white matter hypoattenuation, typical  of chronic microvascular changes. No discrete mass, mass effect, or CT evidence of acute large vascular territory insult. Gray-white differentiation is preserved.    Midline shift: None.    CSF spaces: Proportional appearance of ventricular and sulcal enlargement. No hydrocephalus. No masses or fluid collections.    Vasculature: No focally hyperdense artery. Scattered carotid siphon calcifications are present. No abnormal densities within the dural sinuses.    Other findings: No abnormalities of the scalp or subjacent osseous structures. Mastoids are well aerated. No focal abnormality of the sella. The included facial structures are unremarkable.    IMPRESSION  1. No acute intracranial abnormality.  2. Age-related atrophy and chronic sequela of white matter microvascular disease.  3. Additional chronic secondary details discussed above.    RADIATION DOSE  Automated tube current modulation, weight-based exposure dosing, and/or iterative reconstruction technique utilized to reach lowest reasonably achievable exposure rate.    DLP: 956 mGy*cm      Electronically signed by: Dong Grant  Date:    04/07/2023  Time:    14:00                                      CT Abdomen Pelvis With Contrast (Final result)  Result time 04/07/23 14:07:39      Final result by Lissette Lambert MD (04/07/23 14:07:39)                   Impression:      1. Anasarca.  Bilateral pleural effusions, free intraperitoneal fluid, mesenteric edema, retroperitoneal edema, presacral edema, body wall edema  2. Filling defect at the left ventricular apex highly suspicious for thrombus.  Correlate with echocardiogram.  3. Cardiomegaly  4. It is difficult to exclude bowel wall edema particularly at the colon which is decompressed.  5. Severe aortoiliac atherosclerosis  6.  This report was flagged in Epic as abnormal.      Electronically signed by: Lissette Lambert  Date:    04/07/2023  Time:    14:07               Narrative:    EXAMINATION:  CT ABDOMEN  PELVIS WITH CONTRAST    CLINICAL HISTORY:  Abdominal pain, acute, nonlocalized;    TECHNIQUE:  Helically acquired images with axial, sagittal and coronal reformations were obtained from the lung bases to the pubic symphysis after the IV administration of contrast.    Automated tube current modulation, weight-based exposure dosing, and/or iterative reconstruction technique utilized to reach lowest reasonably achievable exposure rate.    DLP: 1651 mGy*cm    COMPARISON:  CT chest 11/04/2020    FINDINGS:  LIMITATIONS: Beam hardening artifact related to placement of patient's arms down by the sides.    HEART: Cardiomegaly.  Filling defect at the left ventricular apex measuring approximately 4.3 cm (2, 11).  Distal aspect of a pacer device is seen at the right atrium and right ventricular apex.    LUNG BASES: There are small bilateral pleural effusions.  Basilar atelectasis.    LIVER: Normal attenuation. No appreciable focal hepatic lesion.    BILIARY: Possible small dependently layering gallstone.    PANCREAS: No inflammatory change.    SPLEEN: Normal in size    ADRENALS: No mass.    KIDNEYS/URETERS: Beam hardening artifact obscures evaluation of the kidneys.  Left renal cyst requires no dedicated imaging follow-up.  No hydronephrosis seen.    GI TRACT/MESENTERY:  No evidence of bowel obstruction.  The colon is decompressed which limits evaluation for wall thickening.    PERITONEUM AND RETROPERITONEUM: There is small volume free intraperitoneal fluid.  There is mesenteric edema.  There is retroperitoneal edema.  Presacral edema.No free air.    LYMPH NODES: No enlarged lymph nodes by size criteria.    VASCULATURE: Severe aortoiliac atherosclerosis.    BLADDER: Normal appearance given degree of distention.    REPRODUCTIVE ORGANS: Normal as visualized.    SOFT TISSUES: Body wall edema.    BONES: No acute osseous abnormality.                                       X-Ray Hip 2 or 3 views Right (with Pelvis when performed)  (Final result)  Result time 04/07/23 13:28:25      Final result by Lissette Lambert MD (04/07/23 13:28:25)                   Impression:      No acute osseous abnormality.      Electronically signed by: Lissette Lambert  Date:    04/07/2023  Time:    13:28               Narrative:    EXAMINATION:  XR HIP WITH PELVIS WHEN PERFORMED, 2 OR 3  VIEWS RIGHT    CLINICAL HISTORY:  Unspecified fall, initial encounter    TECHNIQUE:  AP view of the pelvis and AP and frog leg lateral view of the right hip were performed.    COMPARISON:  None.    FINDINGS:  BONE: No fracture. No dislocation.    SOFT TISSUES: Calcific atherosclerosis.                                          Physical Exam  Constitutional:       Appearance: He is ill-appearing.   Cardiovascular:      Rate and Rhythm: Normal rate and regular rhythm.      Heart sounds: Normal heart sounds.   Pulmonary:      Effort: Pulmonary effort is normal.   Skin:     Comments: LLE cold from ankle to toes  No doppler or pedal pulse to Left foot.  Left foot pale   Neurological:      Mental Status: He is disoriented.       Home Medications:   No current facility-administered medications on file prior to encounter.     Current Outpatient Medications on File Prior to Encounter   Medication Sig Dispense Refill    apixaban (ELIQUIS) 5 mg Tab Take 5 mg by mouth.      aspirin (ECOTRIN) 81 MG EC tablet Take 1 tablet (81 mg total) by mouth once daily. 30 tablet 1    atorvastatin (LIPITOR) 80 MG tablet Take 80 mg by mouth.      carvediloL (COREG) 12.5 MG tablet Take 12.5 mg by mouth 2 (two) times daily.      clopidogreL (PLAVIX) 75 mg tablet Take 75 mg by mouth.      famotidine (PEPCID) 20 MG tablet Take 20 mg by mouth.      losartan (COZAAR) 50 MG tablet Take 1 tablet by mouth once daily.      metFORMIN (GLUCOPHAGE) 1000 MG tablet Take 1,000 mg by mouth.      mupirocin (BACTROBAN) 2 % ointment Apply to nostrils twice daily 1 Tube 0    nitroGLYCERIN (NITROSTAT) 0.4 MG SL tablet DISSOLVE  1 TABLET UNDER THE TONGUE EVERY 5 MINUTES AS NEEDED FOR CHEST PAIN FOR UP TO 3 DOSES. IF NO RELIEF, SEEK MEDICAL ATTENTION      sacubitriL-valsartan (ENTRESTO) 24-26 mg per tablet Take by mouth.         Current Inpatient Medications:    Current Facility-Administered Medications:     0.9%  NaCl infusion (for blood administration), , Intravenous, Q24H PRN, Nahid Sen MD    acetaminophen tablet 1,000 mg, 1,000 mg, Oral, Q6H PRN, Nate Robledo MD    acetaminophen tablet 650 mg, 650 mg, Oral, Q4H PRN, Nate Robledo MD    aluminum-magnesium hydroxide-simethicone 200-200-20 mg/5 mL suspension 30 mL, 30 mL, Oral, QID PRN, Nate Robledo MD    bivalirudin (ANGIOMAX) 250 mg in dextrose 5 % (D5W) 50 mL infusion, 0.08 mg/kg/hr, Intravenous, Continuous, Nahid Sen MD, Last Rate: 1.4 mL/hr at 04/09/23 1810, 0.08 mg/kg/hr at 04/09/23 1810    carvediloL tablet 12.5 mg, 12.5 mg, Oral, BID, Nate Robledo MD, 12.5 mg at 04/09/23 2039    cloNIDine tablet 0.2 mg, 0.2 mg, Oral, TID PRN, Nate Robledo MD    dextrose 10% bolus 125 mL 125 mL, 12.5 g, Intravenous, PRN, Nate Robledo MD    dextrose 10% bolus 250 mL 250 mL, 25 g, Intravenous, PRN, Nate Robledo MD    dextrose 5 % and 0.45 % NaCl infusion, , Intravenous, Continuous, Alex Almeida MD, Last Rate: 75 mL/hr at 04/09/23 2015, New Bag at 04/09/23 2015    furosemide injection 80 mg, 80 mg, Intravenous, BID, Nate Robledo MD, 80 mg at 04/09/23 1714    glucagon (human recombinant) injection 1 mg, 1 mg, Intramuscular, PRN, Nate Robledo MD    hydrALAZINE injection 10 mg, 10 mg, Intravenous, Q4H PRN, Nate Robledo MD    insulin aspart U-100 injection 0-5 Units, 0-5 Units, Subcutaneous, Q6H PRN, Nate Robledo MD    melatonin tablet 6 mg, 6 mg, Oral, Nightly PRN, Nate Robledo MD    miconazole NITRATE 2 % top powder, , Topical (Top), BID, Nate Robledo MD    mupirocin 2 % ointment, , Topical (Top), BID, Nate Robledo MD    nitroGLYCERIN 2% TD oint ointment 1 inch, 1 inch, Topical (Top), Q6H, Monica HOWARD  ABDIFATAH HendricksonP, 1 inch at 04/10/23 0535    ondansetron injection 4 mg, 4 mg, Intravenous, Q4H PRN, Nate Robledo MD    piperacillin-tazobactam (ZOSYN) 4.5 g in dextrose 5 % in water (D5W) 5 % 100 mL IVPB (MB+), 4.5 g, Intravenous, Q8H, Nate Robledo MD, Stopped at 04/10/23 0514    polyethylene glycol packet 17 g, 17 g, Oral, BID PRN, Nate Robledo MD    potassium chloride 20 mEq in 100 mL IVPB (FOR CENTRAL LINE ADMINISTRATION ONLY), 40 mEq, Intravenous, Q4H, Ellen Figueroa, [facility], Last Rate: 50 mL/hr at 04/10/23 0634, 40 mEq at 04/10/23 0634    potassium chloride SA CR tablet 40 mEq, 40 mEq, Oral, Once, Monica Hendrickson, ZEYNEP    prochlorperazine injection Soln 5 mg, 5 mg, Intravenous, Q6H PRN, Nate Robledo MD    sacubitriL-valsartan 24-26 mg per tablet 1 tablet, 1 tablet, Oral, BID, Nate Robledo MD, 1 tablet at 04/09/23 2038    senna-docusate 8.6-50 mg per tablet 2 tablet, 2 tablet, Oral, BID PRN, Nate Robledo MD    sodium chloride 0.9% flush 10 mL, 10 mL, Intravenous, PRN, Nate Robledo MD    Flushing PICC Protocol, , , Until Discontinued **AND** sodium chloride 0.9% flush 10 mL, 10 mL, Intravenous, Q6H, 10 mL at 04/10/23 0535 **AND** sodium chloride 0.9% flush 10 mL, 10 mL, Intravenous, PRN, Nahid Sen MD    Pharmacy to dose Vancomycin consult, , , Once **AND** vancomycin - pharmacy to dose, , Intravenous, pharmacy to manage frequency, Alex Almeida MD    vancomycin in dextrose 5 % 1 gram/250 mL IVPB 1,000 mg, 1,000 mg, Intravenous, Q12H, Alex Almeida MD     Imaging:  Claudication or leg ischemia;pad; cta aorta with runoff;     TECHNIQUE:  CT images of the abdomen, pelvis, and lower extremities before and after IV contrast. Axial, coronal and sagittal reformatted images are reviewed. 3D reconstructed images were also performed for further evaluation of the vasculature. Dose length product is 2453 mGycm. Automatic exposure control, adjustment of mA/kV or iterative reconstruction technique was used to limit  radiation dose.     COMPARISON:  CT abdomen pelvis dated 04/07/2023     FINDINGS:  There are diffuse atherosclerotic changes of the abdominal aorta and its branches.  The celiac artery is patent with mild narrowing.  The superior mesenteric artery and inferior mesenteric artery are patent.  The common iliac arteries are patent with mild narrowing bilaterally.  There is non opacification of the left internal iliac artery.  The proximal right internal iliac artery is patent.  The external iliac arteries are patent with moderate narrowing bilaterally.     The right femoral artery is patent with mild multifocal narrowing.  There is a patent stent within the distal femoral artery.  The popliteal artery is patent with mild multifocal narrowing.  There is no flow within the tibioperoneal trunk.  There is reconstitution of flow in the peroneal artery, with minimal scattered flow the posterior and anterior tibial arteries.     The left femoral artery is occluded with patent deep femoral artery.  The popliteal artery is occluded.  There is reconstitution of flow with minimal scattered flow in the peroneal and posterior tibial arteries.     There are small bilateral pleural effusions with dependent airspace consolidations.  There is no acute abnormality of the liver, gallbladder, spleen or pancreas.  The adrenal glands are difficult to visualize.  There is no hydronephrosis.  There is no bowel obstruction.  Small volume of ascites is noted.  There is diffuse anasarca     Impression:     1. Extensive atherosclerotic changes of the abdominal aorta and its branches with bilateral peripheral vascular disease.  2. Minimal scattered bilateral three-vessel runoff.          VTE Risk Mitigation (From admission, onward)           Ordered     IP VTE LOW RISK PATIENT  Once         04/07/23 2156     Place sequential compression device  Until discontinued         04/07/23 2156                    Assessment:   NSTEMI, unspecified  "  --troponin 1.266-1.061-1.289  Native CAD  --hx CABG 3V 2021  ICMO  Chronic combined systolic and diastolic HF  --EF 15%, grade I LVDD  --BNP 3249  LV thrombus  Critical limb ischemia LLE  Anemia  Thrombocytopenia  --consider DIC  --given 1 unit Plts  GIB  --H/H stable  PAFib  --CHADS VASC score 5  --on eliquis outpatient  VHD  --mild MR/TR  HTN  HLD  T2DM  --A1c 10.7  CINTHIA  Congestive hepatopathy  Hx CHB  --dual chamber PPM  Tobacco dependence  Hypokalemia    Plan:   CTA reviewed.   I discussed with vascular surgery, due to high comorbidities as well poor targets and lower leg including popliteal patient is not a candidate for surgery either.  Not much to offer from PAD standpoint as patient is thrombocytopenic and would note tolerated DAPT.   Angiomax on hold due to GIB  Continue NTP 1" to left foot  Good UOP. Continue Lasix 80mg IVP bid  Strict I&Os/daily weights  Resume entresto 24-26mg bid and coreg 12.5mg bid once tolerating oral intake  Replete potassium  There is no family member to discuss regarding patient condition.  Patient likely to need amputation for this cold left lower leg.        Monica Hendrickson, ZEYNEP Cardiology  [facility]  04/10/2023   "

## 2023-04-11 PROBLEM — E87.0 HYPERNATREMIA: Status: ACTIVE | Noted: 2023-01-01

## 2023-04-11 PROBLEM — E87.3 ALKALOSIS: Status: ACTIVE | Noted: 2023-01-01

## 2023-04-11 PROBLEM — N17.9 AKI (ACUTE KIDNEY INJURY): Status: ACTIVE | Noted: 2023-01-01

## 2023-04-11 PROBLEM — E86.0 DEHYDRATION: Status: ACTIVE | Noted: 2023-01-01

## 2023-04-11 NOTE — PT/OT/SLP PROGRESS
Pt tx to ICU due to change in status.  POC discussed with nursing.  SLP will sign off, please reconsult as needed.

## 2023-04-11 NOTE — PLAN OF CARE
CARSON spoke with patient's sister Kim Urrutia by phone, [phone] who lives next door to [facility]. She is calling his brother, Bolivar Patel to come to hospital with her. Bolivar's contact is [phone]. Nurse made aware, CARSON will message Dr. Valdez.

## 2023-04-11 NOTE — PLAN OF CARE
Assessment completed with sister, Kim Urrutia by phone [phone]. She states patient lives next door to her at [facility] and has been independent prior to being found on floor at his home. Kim was made aware that patient's is confused and unable to make his own medical decisions. Kim states she and her brother Bolivar with come to hospital this evening to visit.    04/11/23 1522   Discharge Assessment   Assessment Type Discharge Planning Assessment   Confirmed/corrected address, phone number and insurance Yes   Confirmed Demographics Correct on Facesheet   Source of Information family;health record   When was your last doctors appointment?   (Sheila Eldridge)   Communicated RONA with patient/caregiver Date not available/Unable to determine   Reason For Admission Fall at home   People in Home alone   Do you expect to return to your current living situation? Other (see comments)  (tbd)   Do you have help at home or someone to help you manage your care at home? No   Prior to hospitilization cognitive status: Alert/Oriented   Current cognitive status: Not Oriented to Place;Not Oriented to Time;Inappropriate Behavior   Home Layout Able to live on 1st floor   Discharge Plan A New Nursing Home placement - intermediate care facility   Discharge Plan B Comfort care/withdrawal   Discharge Plan discussed with: Sibling   Name(s) and Number(s) Kim Urrutia [phone]   Social Connections   Are you , , , , never , or living with a partner?

## 2023-04-11 NOTE — NURSING
Nurses Note -- 4 Eyes      4/11/2023   8:52 AM      Skin assessed during: Daily Assessment      [] No Pressure Injuries Present    [x]Prevention Measures Documented      [x] Yes- Altered Skin Integrity Present or Discovered   [] LDA Added if Not in Epic (Describe Wound)   [] New Altered Skin Integrity was Present on Admit and Documented in LDA   [] Wound Image Taken    Wound Care Consulted? Yes    Attending Nurse:  Nicky Bone RN     Second RN/Staff Member:  MERON Ford tech

## 2023-04-11 NOTE — PROGRESS NOTES
[facility] General - Cardiology - Progress Note    Patient Name: Darren Patel  MRN: [id number]  Admission Date: 4/7/2023  Hospital Length of Stay: 4 days  Code Status: Full Code   Attending Provider: Ki Gonzalez MD   Consulting Provider: MICHELLE Shankar  Primary Care Physician: ZEYNEP Rosales  Principal Problem:Acute on chronic HFrEF (heart failure with reduced ejection fraction)    Patient information was obtained from past medical records and ER records.     Subjective:     Chief Complaint: AMS    HPI: Mr. Patel is  a 68y/o male, known to [facility] cardiology, with PMHx significant for ICMO, PAfib, HLD, CHB, HTN, CAD, T2DM, and PAD who presented to ED via EMS after being found naked on the floor. Patient reporting falling the day prior and unable to get up. He has c/o generalized weakness, right hip and right sided abdominal pain. Denies CP or SOB. CT head negative. CT abdomen/pelvis revealing anasarca with small bilateral pleural effusions, small ascites, cardiomegaly, filling defect in LV apex highly suspicious for thrombus and severe aortoiliac atherosclerosis. Echo revealing EF 15%, grade I LVDD, large fixed LV apical thrombus and mobile echodensity on RV pacer lead. BLE Venous US negative DVT. BLE arterial Us consistent with bilateral severe arterial flow reduction. Labs significant for SBC 12.3, H/H 12.2/37.9, Plt 57, INR 2.06, Na 146, Bun/creatinine 48.7/1.32, AST//176, BNP 3249, , Troponin 1.266-1.061-1.289. He was treated with 1L NS, IV ABX and admitted to hospitalist. [facility] has been consulted for NSTEMI.   **call by nurse this am regarding pale cool LLE with absent infrapopliteal pulses    Hospital Course:  4.08.23: Plts declining. Remains confused. CT head negative for acute intracranial abnormality. CTA aorta with runoff noted.   4.09.23: Remains confused. Plts 40. No evidence of bleeding.Left foot discoloration to left big toe. Foot cold with out pulses  4.10.23: Patient  given plts yesterday and started on Angiomax drip. He had GIB overnight and Angiomax turned off. Plts 75 today. Left foot is cold and pulseless.   4.11.23: NAD. Simple Face Mask. AMS/Confused Conversation. Levophed 0.06mcg/kg/min. RRT Last PM with Hypotension. Started on IVFs/Bolused and Placed on Pressors and upgraded to ICU.     PMH: ICMO, PAfib, HLD, CHB, HTN, CAD, T2DM,   PSH: LHC, Peripheral angiogram. 3V CABG '21  Family History: unknown  Social History: current smoker; hx cocaine use    Previous Cardiac Diagnostics:   Carotid US 04.09.23:  Abnormal study.  The left common, external and internal carotid arteries are occluded.  The right internal carotid artery demonstrated less than 50% stenosis.  Bilateral vertebral arteries were patent with antegrade flow.    TTE 04.07.23:  The left ventricle is mildly enlarged with eccentric hypertrophy and severely decreased systolic function. The estimated ejection fraction is 15%. Grade I left ventricular diastolic dysfunction. Severe left atrial enlargement. Mild mitral regurgitation. Elevated central venous pressure (15 mmHg). The estimated PA systolic pressure is 39 mmHg. Mobile echodensity noted on pacer lead. A large left ventricular thrombus is present. The thrombus is fixed and located in the apex. Mild tricuspid regurgitation.    Venous US BLE 04.07.23:  There was no evidence of deep or superficial vein thrombosis in the bilateral lower extremities.    Arterial US BLE 04.07.23:  The right lower extremity demonstrated severe arterial flow reduction consistent with inflow disease and monophasic waveforms throughout.    Trickle flow was seen in the posterior tibial artery of the right lower extremity.    The left lower extremity demonstrated severe arterial flow reduction primarily in the popliteal and tibial arteries consistent with monophasic waveforms.      Right Peripheral angiogram 5/20/22:  Occlusion of distal popliteal artery, anterior tibial artery and  severe stenosis in Posterior tibial and peroneal artery    Right peripheral angiogram 05/05/2022:  Stenting of right SFA, right profundus, and right external iliac artery    Review of patient's allergies indicates:  No Known Allergies    Review of Systems   Unable to perform ROS: Acuity of condition     Objective:     Vital Signs (Most Recent):  Temp: 98.4 °F (36.9 °C) (04/11/23 0845)  Pulse: 79 (04/11/23 1030)  Resp: (!) 23 (04/11/23 1030)  BP: (!) 102/59 (04/11/23 1030)  SpO2: 97 % (04/11/23 1030) Vital Signs (24h Range):  Temp:  [96.7 °F (35.9 °C)-98.4 °F (36.9 °C)] 98.4 °F (36.9 °C)  Pulse:  [] 79  Resp:  [15-30] 23  SpO2:  [90 %-100 %] 97 %  BP: ()/(41-76) 102/59     Weight: 85 kg (187 lb 6.3 oz)  Body mass index is 25.41 kg/m².    SpO2: 97 %         Intake/Output Summary (Last 24 hours) at 4/11/2023 1125  Last data filed at 4/11/2023 1000  Gross per 24 hour   Intake --   Output 1625 ml   Net -1625 ml       Lines/Drains/Airways       Peripherally Inserted Central Catheter Line  Duration             PICC Double Lumen 04/08/23 1236 right basilic 2 days              Drain  Duration                  Urethral Catheter 04/11/23 0000 <1 day                  Significant Labs:  Recent Results (from the past 72 hour(s))   Drug Screen, Urine    Collection Time: 04/08/23  2:11 PM   Result Value Ref Range    Amphetamines, Urine Negative Negative    Barbituates, Urine Negative Negative    Benzodiazepine, Urine Negative Negative    Cannabinoids, Urine Negative Negative    Cocaine, Urine Negative Negative    Fentanyl, Urine Negative Negative    MDMA, Urine Negative Negative    Opiates, Urine Negative Negative    Phencyclidine, Urine Negative Negative    pH, Urine 5.5 3.0 - 11.0   POCT glucose    Collection Time: 04/09/23  4:59 AM   Result Value Ref Range    POCT Glucose 130 (H) 70 - 110 mg/dL   Comprehensive Metabolic Panel    Collection Time: 04/09/23  5:19 AM   Result Value Ref Range    Sodium Level 154 (H) 136 -  145 mmol/L    Potassium Level 2.8 (L) 3.5 - 5.1 mmol/L    Chloride 109 (H) 98 - 107 mmol/L    Carbon Dioxide 31 23 - 31 mmol/L    Glucose Level 132 (H) 82 - 115 mg/dL    Blood Urea Nitrogen 43.2 (H) 8.4 - 25.7 mg/dL    Creatinine 1.17 0.73 - 1.18 mg/dL    Calcium Level Total 8.4 (L) 8.8 - 10.0 mg/dL    Protein Total 4.9 (L) 5.8 - 7.6 gm/dL    Albumin Level 2.1 (L) 3.4 - 4.8 g/dL    Globulin 2.8 2.4 - 3.5 gm/dL    Albumin/Globulin Ratio 0.8 (L) 1.1 - 2.0 ratio    Bilirubin Total 6.5 (H) <=1.5 mg/dL    Alkaline Phosphatase 91 40 - 150 unit/L    Alanine Aminotransferase 128 (H) 0 - 55 unit/L    Aspartate Aminotransferase 73 (H) 5 - 34 unit/L    eGFR >60 mls/min/1.73/m2   Magnesium    Collection Time: 04/09/23  5:19 AM   Result Value Ref Range    Magnesium Level 1.90 1.60 - 2.60 mg/dL   CBC with Differential    Collection Time: 04/09/23  5:19 AM   Result Value Ref Range    WBC 10.5 4.5 - 11.5 x10(3)/mcL    RBC 4.07 (L) 4.70 - 6.10 x10(6)/mcL    Hgb 11.4 (L) 14.0 - 18.0 g/dL    Hct 35.4 (L) 42.0 - 52.0 %    MCV 87.0 80.0 - 94.0 fL    MCH 28.0 27.0 - 31.0 pg    MCHC 32.2 (L) 33.0 - 36.0 g/dL    RDW 21.2 (H) 11.5 - 17.0 %    Platelet 40 (L) 130 - 400 x10(3)/mcL    MPV      NRBC% 1.2 %   Manual Differential    Collection Time: 04/09/23  5:19 AM   Result Value Ref Range    Neut Man 91 %    Lymph Man 4 %    Monocyte Man 2 %    Ceres Man 3 %    Instr WBC 10.5 x10(3)/mcL    Abs Mono 0.21 0.1 - 1.3 x10(3)/mcL    Abs Lymp 0.42 (L) 0.6 - 4.6 x10(3)/mcL    Abs Neut 9.87 (H) 2.1 - 9.2 x10(3)/mcL    NRBC Man 4 %    Polychrom 1+ (A) (none)    RBC Morph Abnormal (A) Normal    Anisocyte 2+ (A) (none)    Poik 1+ (A) (none)    Macrocyte 1+ (A) (none)    Target Cell 2+ (A) (none)    [place] Cells 1+ (A) (none)    Platelet Est Decreased (A) Normal, Adequate    Giant Platelets 1+    CV Ultrasound Bilateral Doppler Carotid    Collection Time: 04/09/23  9:32 AM   Result Value Ref Range    Right ICA/CCA ratio 0.53     Left Highest ICA 0.00     Left  Highest CCA 0     Right Highest ICA 66.00     Right Highest      Right Highest EDV 17.00     Right CCA prox sys 114 cm/s    Right CCA dist sys 124 cm/s    Right ICA prox sys 66 cm/s    Right ICA prox major 16 cm/s    Right ICA mid sys 42 cm/s    Right ICA mid major 12 cm/s    Right ICA dist sys 48 cm/s    Right ICA dist major 17 cm/s    Right ECA sys 151 cm/s    Right vertebral sys 34 cm/s    Left CCA prox sys 0 cm/s    Left CCA dist sys 0 cm/s    Left ICA prox sys 0 cm/s    Left ICA mid sys 0 cm/s    Left ICA dist sys 0 cm/s    Left ECA sys 0 cm/s    Left vertebral sys 106 cm/s   POCT glucose    Collection Time: 04/09/23 11:34 AM   Result Value Ref Range    POCT Glucose 152 (H) 70 - 110 mg/dL   Creatinine, Serum    Collection Time: 04/09/23  2:18 PM   Result Value Ref Range    Creatinine 1.39 (H) 0.73 - 1.18 mg/dL    eGFR 55 mls/min/1.73/m2   Hepatic function panel    Collection Time: 04/09/23  2:18 PM   Result Value Ref Range    Albumin Level 2.2 (L) 3.4 - 4.8 g/dL    Alkaline Phosphatase 100 40 - 150 unit/L    Alanine Aminotransferase 128 (H) 0 - 55 unit/L    Aspartate Aminotransferase 90 (H) 5 - 34 unit/L    Bilirubin Direct 4.7 (H) 0.0 - <0.5 mg/dL    Bilirubin Indirect 2.20 (H) 0.00 - 0.80 mg/dL    Bilirubin Total 6.9 (H) <=1.5 mg/dL    Protein Total 5.3 (L) 5.8 - 7.6 gm/dL   APTT    Collection Time: 04/09/23  2:18 PM   Result Value Ref Range    PTT 30.9 23.2 - 33.7 seconds   Fibrinogen    Collection Time: 04/09/23  2:18 PM   Result Value Ref Range    Fibrinogen 361.0 210.0 - 463.0 mg/dL   Thrombin Time    Collection Time: 04/09/23  2:18 PM   Result Value Ref Range    Thrombin Time 18 0 - 22 seconds   Hepatitis Panel, Acute    Collection Time: 04/09/23  2:18 PM   Result Value Ref Range    Hepatitis A IgM Nonreactive Nonreactive    Hepatitis B Core IgM Nonreactive Nonreactive    Hepatitis B Surface Antigen Nonreactive Nonreactive    Hepatitis C Antibody Reactive (A) Nonreactive   Pathologist Interpretation     Collection Time: 04/09/23  2:18 PM   Result Value Ref Range    Pathology Review       Chemical abnormalities suggesting hepatic parenchymal injury; no specific evidence of biliary disease.    Hyperbilirubinemia.    Hypoproteinemia and hypoalbuminemia.    Francisco Malave M.D.    POCT glucose    Collection Time: 04/09/23  5:35 PM   Result Value Ref Range    POCT Glucose 156 (H) 70 - 110 mg/dL   Prepare Platelets 1 Dose    Collection Time: 04/09/23  5:36 PM   Result Value Ref Range    UNIT NUMBER [id number]     UNIT ABO/RH A POS     DISPENSE STATUS Released     Unit Expiration [id number]     Product Code T0213F61     Unit Blood Type Code 6200     CROSSMATCH INTERPRETATION Not required     UNIT NUMBER [id number]     UNIT ABO/RH AB POS     DISPENSE STATUS Transfused     Unit Expiration [id number]     Product Code V2696H79     Unit Blood Type Code 8400     CROSSMATCH INTERPRETATION Not required    Type & Screen    Collection Time: 04/09/23  5:36 PM   Result Value Ref Range    Group & Rh O POS     Indirect Ritesh GEL NEG     Specimen Outdate 04/12/2023 23:59    ABORH RETYPE    Collection Time: 04/09/23  6:00 PM   Result Value Ref Range    ABORH Retype O POS    POCT glucose    Collection Time: 04/09/23  8:27 PM   Result Value Ref Range    POCT Glucose 173 (H) 70 - 110 mg/dL   APTT    Collection Time: 04/09/23  9:07 PM   Result Value Ref Range    PTT 54.5 (H) 23.2 - 33.7 seconds   Magnesium    Collection Time: 04/10/23  4:24 AM   Result Value Ref Range    Magnesium Level 2.00 1.60 - 2.60 mg/dL   Lactic Acid, Plasma    Collection Time: 04/10/23  4:24 AM   Result Value Ref Range    Lactic Acid Level 2.1 0.5 - 2.2 mmol/L   Comprehensive Metabolic Panel    Collection Time: 04/10/23  4:24 AM   Result Value Ref Range    Sodium Level 156 (H) 136 - 145 mmol/L    Potassium Level 2.5 (LL) 3.5 - 5.1 mmol/L    Chloride 105 98 - 107 mmol/L    Carbon Dioxide 40 (H) 23 - 31 mmol/L    Glucose Level 191 (H) 82 - 115 mg/dL     Blood Urea Nitrogen 39.3 (H) 8.4 - 25.7 mg/dL    Creatinine 1.18 0.73 - 1.18 mg/dL    Calcium Level Total 8.6 (L) 8.8 - 10.0 mg/dL    Protein Total 5.0 (L) 5.8 - 7.6 gm/dL    Albumin Level 2.1 (L) 3.4 - 4.8 g/dL    Globulin 2.9 2.4 - 3.5 gm/dL    Albumin/Globulin Ratio 0.7 (L) 1.1 - 2.0 ratio    Bilirubin Total 6.4 (H) <=1.5 mg/dL    Alkaline Phosphatase 92 40 - 150 unit/L    Alanine Aminotransferase 105 (H) 0 - 55 unit/L    Aspartate Aminotransferase 95 (H) 5 - 34 unit/L    eGFR >60 mls/min/1.73/m2   Vancomycin, Random    Collection Time: 04/10/23  4:24 AM   Result Value Ref Range    Vanc Lvl Random 20.6 (H) 15.0 - 20.0 ug/ml   APTT    Collection Time: 04/10/23  4:24 AM   Result Value Ref Range    PTT 65.6 (H) 23.2 - 33.7 seconds   CBC with Differential    Collection Time: 04/10/23  4:24 AM   Result Value Ref Range    WBC 11.1 4.5 - 11.5 x10(3)/mcL    RBC 3.94 (L) 4.70 - 6.10 x10(6)/mcL    Hgb 11.3 (L) 14.0 - 18.0 g/dL    Hct 36.2 (L) 42.0 - 52.0 %    MCV 91.9 80.0 - 94.0 fL    MCH 28.7 27.0 - 31.0 pg    MCHC 31.2 (L) 33.0 - 36.0 g/dL    RDW 21.5 (H) 11.5 - 17.0 %    Platelet 75 (L) 130 - 400 x10(3)/mcL    MPV 11.3 (H) 7.4 - 10.4 fL    Neut % 82.9 %    Lymph % 9.1 %    Mono % 5.5 %    Eos % 0.4 %    Basophil % 0.5 %    Lymph # 1.01 0.6 - 4.6 x10(3)/mcL    Neut # 9.17 2.1 - 9.2 x10(3)/mcL    Mono # 0.61 0.1 - 1.3 x10(3)/mcL    Eos # 0.04 0 - 0.9 x10(3)/mcL    Baso # 0.05 0 - 0.2 x10(3)/mcL    IG# 0.18 (H) 0 - 0.04 x10(3)/mcL    IG% 1.6 %    NRBC% 0.5 %   Lactic Acid, Plasma    Collection Time: 04/10/23  6:24 AM   Result Value Ref Range    Lactic Acid Level 2.2 0.5 - 2.2 mmol/L   POCT glucose    Collection Time: 04/10/23  6:31 AM   Result Value Ref Range    POCT Glucose 209 (H) 70 - 110 mg/dL   Lactic Acid, Plasma    Collection Time: 04/10/23  8:26 AM   Result Value Ref Range    Lactic Acid Level 2.2 0.5 - 2.2 mmol/L   Lactic Acid, Plasma    Collection Time: 04/10/23 10:34 AM   Result Value Ref Range    Lactic Acid  Level 2.0 0.5 - 2.2 mmol/L   Occult Blood Stool, CA Screen    Collection Time: 04/10/23  6:13 PM   Result Value Ref Range    Stool Color 1 Red     Stool Consistancy 1 Mucoid     Occult Blood Stool 1 Positive (A) Negative   APTT    Collection Time: 04/10/23  6:17 PM   Result Value Ref Range    PTT 57.3 (H) 23.2 - 33.7 seconds   POCT glucose    Collection Time: 04/10/23  7:59 PM   Result Value Ref Range    POCT Glucose 144 (H) 70 - 110 mg/dL   POCT ARTERIAL BLOOD GAS    Collection Time: 04/10/23  8:25 PM   Result Value Ref Range    POC PH 7.62 (AA) 7.29 - 7.60    POC PCO2 41 35 - 45 mmHg    POC PO2 76 (A) 80 - 100 mmHg    POC HEMOGLOBIN 10.9 (A) 12.0 - 16.0 g/dL    POC SATURATED O2 97.2 %    POC O2Hb 95.0 94.0 - 97.0 %    POC COHb 2.9 %    POC MetHb 0.50 0.40 - 1.5 %    POC Potassium 2.7 (A) 3.5 - 5.0 mmol/l    POC Sodium 149 (A) 137 - 145 mmol/l    POC Ionized Calcium 1.09 (A) 1.12 - 1.23 mmol/l    Correct Temperature (PH) 7.62 (AA) 7.29 - 7.60    Corrected Temperature (pCO2) 41 35 - 45 mmHg    Corrected Temperature (pO2) 76 (A) 80 - 100 mmHg    POC HCO3 42.1 (A) 22.0 - 26.0 mmol/l    Base Deficit 19.0 (A) -2.00 - 2.00 mmol/l    POC Temp 37.0 °C    Specimen source Arterial sample    Lactic acid, plasma    Collection Time: 04/10/23  8:53 PM   Result Value Ref Range    Lactic Acid Level 2.0 0.5 - 2.2 mmol/L   Basic Metabolic Panel    Collection Time: 04/10/23  9:41 PM   Result Value Ref Range    Sodium Level 157 (H) 136 - 145 mmol/L    Potassium Level 3.3 (L) 3.5 - 5.1 mmol/L    Chloride 110 (H) 98 - 107 mmol/L    Carbon Dioxide 37 (H) 23 - 31 mmol/L    Glucose Level 150 (H) 82 - 115 mg/dL    Blood Urea Nitrogen 44.3 (H) 8.4 - 25.7 mg/dL    Creatinine 1.29 (H) 0.73 - 1.18 mg/dL    BUN/Creatinine Ratio 34     Calcium Level Total 8.5 (L) 8.8 - 10.0 mg/dL    Anion Gap 10.0 mEq/L    eGFR 60 mls/min/1.73/m2   Magnesium    Collection Time: 04/10/23  9:41 PM   Result Value Ref Range    Magnesium Level 2.00 1.60 - 2.60 mg/dL    CBC with Differential    Collection Time: 04/10/23  9:41 PM   Result Value Ref Range    WBC 13.2 (H) 4.5 - 11.5 x10(3)/mcL    RBC 3.92 (L) 4.70 - 6.10 x10(6)/mcL    Hgb 11.4 (L) 14.0 - 18.0 g/dL    Hct 36.5 (L) 42.0 - 52.0 %    MCV 93.1 80.0 - 94.0 fL    MCH 29.1 27.0 - 31.0 pg    MCHC 31.2 (L) 33.0 - 36.0 g/dL    RDW 21.5 (H) 11.5 - 17.0 %    Platelet 54 (L) 130 - 400 x10(3)/mcL    MPV      Neut % 82.2 %    Lymph % 8.3 %    Mono % 7.2 %    Eos % 0.2 %    Basophil % 0.4 %    Lymph # 1.10 0.6 - 4.6 x10(3)/mcL    Neut # 10.90 (H) 2.1 - 9.2 x10(3)/mcL    Mono # 0.95 0.1 - 1.3 x10(3)/mcL    Eos # 0.02 0 - 0.9 x10(3)/mcL    Baso # 0.05 0 - 0.2 x10(3)/mcL    IG# 0.22 (H) 0 - 0.04 x10(3)/mcL    IG% 1.7 %    NRBC% 0.2 %   Troponin I    Collection Time: 04/10/23  9:41 PM   Result Value Ref Range    Troponin-I 2.099 (H) 0.000 - 0.045 ng/mL   POCT glucose    Collection Time: 04/10/23 11:24 PM   Result Value Ref Range    POCT Glucose 141 (H) 70 - 110 mg/dL   POCT ARTERIAL BLOOD GAS    Collection Time: 04/11/23  2:20 AM   Result Value Ref Range    POC PH 7.62 (AA) 7.29 - 7.60    POC PCO2 38 35 - 45 mmHg    POC PO2 75 (A) 80 - 100 mmHg    POC HEMOGLOBIN 11.8 (A) 12.0 - 16.0 g/dL    POC SATURATED O2 97.2 %    POC O2Hb 94.2 94.0 - 97.0 %    POC COHb 2.5 %    POC MetHb 1.1 0.40 - 1.5 %    POC Potassium 3.3 (A) 3.5 - 5.0 mmol/l    POC Sodium 149 (A) 137 - 145 mmol/l    POC Ionized Calcium 1.15 1.12 - 1.23 mmol/l    Correct Temperature (PH) 7.62 (AA) 7.29 - 7.60    Corrected Temperature (pCO2) 38 35 - 45 mmHg    Corrected Temperature (pO2) 75 (A) 80 - 100 mmHg    POC HCO3 39.1 (A) 22.0 - 26.0 mmol/l    Base Deficit 16.5 (A) -2.00 - 2.00 mmol/l    POC Temp 37.0 °C    Specimen source Arterial sample    Fibrinogen    Collection Time: 04/11/23  2:47 AM   Result Value Ref Range    Fibrinogen 406.0 210.0 - 463.0 mg/dL   Protime-INR    Collection Time: 04/11/23  2:47 AM   Result Value Ref Range    PT 17.8 (H) 12.5 - 14.5 seconds    INR  1.49 (H) 0.00 - 1.30   Magnesium    Collection Time: 04/11/23  2:47 AM   Result Value Ref Range    Magnesium Level 2.00 1.60 - 2.60 mg/dL   Lactic Acid, Plasma    Collection Time: 04/11/23  2:47 AM   Result Value Ref Range    Lactic Acid Level 2.0 0.5 - 2.2 mmol/L   Phosphorus    Collection Time: 04/11/23  2:47 AM   Result Value Ref Range    Phosphorus Level 2.6 2.3 - 4.7 mg/dL   CBC with Differential    Collection Time: 04/11/23  2:47 AM   Result Value Ref Range    WBC 14.3 (H) 4.5 - 11.5 x10(3)/mcL    RBC 4.13 (L) 4.70 - 6.10 x10(6)/mcL    Hgb 12.0 (L) 14.0 - 18.0 g/dL    Hct 37.4 (L) 42.0 - 52.0 %    MCV 90.6 80.0 - 94.0 fL    MCH 29.1 27.0 - 31.0 pg    MCHC 32.1 (L) 33.0 - 36.0 g/dL    RDW 21.4 (H) 11.5 - 17.0 %    Platelet 55 (L) 130 - 400 x10(3)/mcL    MPV      Neut % 81.2 %    Lymph % 8.4 %    Mono % 8.3 %    Eos % 0.1 %    Basophil % 0.6 %    Lymph # 1.20 0.6 - 4.6 x10(3)/mcL    Neut # 11.59 (H) 2.1 - 9.2 x10(3)/mcL    Mono # 1.19 0.1 - 1.3 x10(3)/mcL    Eos # 0.01 0 - 0.9 x10(3)/mcL    Baso # 0.08 0 - 0.2 x10(3)/mcL    IG# 0.20 (H) 0 - 0.04 x10(3)/mcL    IG% 1.4 %    NRBC% 0.4 %   Troponin I    Collection Time: 04/11/23  2:47 AM   Result Value Ref Range    Troponin-I 2.015 (H) 0.000 - 0.045 ng/mL   Comprehensive Metabolic Panel    Collection Time: 04/11/23  2:47 AM   Result Value Ref Range    Sodium Level 156 (H) 136 - 145 mmol/L    Potassium Level 3.8 3.5 - 5.1 mmol/L    Chloride 110 (H) 98 - 107 mmol/L    Carbon Dioxide 33 (H) 23 - 31 mmol/L    Glucose Level 150 (H) 82 - 115 mg/dL    Blood Urea Nitrogen 42.5 (H) 8.4 - 25.7 mg/dL    Creatinine 1.26 (H) 0.73 - 1.18 mg/dL    Calcium Level Total 8.7 (L) 8.8 - 10.0 mg/dL    Protein Total 5.2 (L) 5.8 - 7.6 gm/dL    Albumin Level 1.8 (L) 3.4 - 4.8 g/dL    Globulin 3.4 2.4 - 3.5 gm/dL    Albumin/Globulin Ratio 0.5 (L) 1.1 - 2.0 ratio    Bilirubin Total 5.2 (H) <=1.5 mg/dL    Alkaline Phosphatase 85 40 - 150 unit/L    Alanine Aminotransferase 92 (H) 0 - 55 unit/L     Aspartate Aminotransferase 87 (H) 5 - 34 unit/L    eGFR >60 mls/min/1.73/m2   Troponin I    Collection Time: 04/11/23 10:26 AM   Result Value Ref Range    Troponin-I 1.926 (H) 0.000 - 0.045 ng/mL   POCT glucose    Collection Time: 04/11/23 11:17 AM   Result Value Ref Range    POCT Glucose 147 (H) 70 - 110 mg/dL     Significant Imaging:  Imaging Results              US Abdomen Limited (Final result)  Result time 04/08/23 09:35:53      Final result by Noel Yoon MD (04/08/23 09:35:53)                   Impression:    Impression:    1. The gallbladder appears full but non-distended. The gallbladder wall measures 3.5 mm in thickness. The sonographic [place] sign is positive. There are two tiny echogenic foci along the gallbladder wall, which measure 9 x 5 x 5 mm and 7 x 3 x 6 mm, which may reflect polyps. Correlate with clinical and laboratory findings as regards additional evaluation and follow-up as the above findings could reflect cholecystitis although the presence of significant ascites decreases sensitivity and specificity.    2. There is mild right hydronephrosis.    3. There is small right pleural effusion. There is mild ascites.    4. Mild hepatomegaly with diffuse fatty infiltration.    5. Details and other findings as noted above.    No significant discrepancy with overnight report.      Electronically signed by: Noel Yoon  Date:    04/08/2023  Time:    09:35               Narrative:      Technique:Limited right upper quadrant abdominal ultrasound was performed.    Comparison: CT abdomen pelvis April 7, 2023 none.    Clinical History:Cholecystitis.    Findings:There is small right pleural effusion. There is mild ascites.    Liver:The liver is enlarged and measures 20 centimeters in greatest dimension and demonstrates increased echogenicity which may reflect fatty infiltration.    Biliary ducts:The common bile duct is within normal limits at 2.4 mm in diameter.    Gallbladder:The gallbladder appears  full but non-distended. The gallbladder wall measures 3.5 mm in thickness. There is no pericholecystic fluid. The sonographic [place] sign is positive. There are two tiny echogenic foci along the gallbladder wall, which measure 9 x 5 x 5 mm and 7 x 3 x 6 mm, which may reflect polyps.    Pancreas:The pancreas cannot be definitively evaluated due to obscuration by bowel gas.    Kidneys:    Right kidney:There is mild right hydronephrosis.    Dimensions:The right kidney measures 10.3 x 5.2 x 7.5 cm.    Vascular:    Portal vein:Flow parameters are within normal limits with hepatopetal flow.    IVC:The IVC is within normal limits.                        Preliminary result by Noel Yoon MD (04/07/23 23:06:34)                   Narrative:    START OF REPORT:  Technique: Limited right upper quadrant abdominal ultrasound was performed.    Comparison: None.    Clinical History: Cholecystitis.    Findings: There is small right pleural effusion. There is mild ascites.  Liver: The liver is mildly enlarged and measures 20 centimeters in greatest dimension and demonstrates increased echogenicity which may reflect fatty infiltration.  Biliary ducts: The common bile duct is within normal limits at 2.4 mm in diameter.  Gallbladder: The gallbladder appears full but non-distended. The gallbladder wall measures 3.5 mm in thickness. There is no pericholecystic fluid. The sonographic [place] sign is positive. There are two tiny echogenic foci along the gallbladder wall, which measure 9 x 5 x 5 mm and 7 x 3 x 6 mm, which may reflect polyps.  Pancreas: The pancreas cannot be definitively evaluated due to obscuration by bowel gas.  Kidneys:  Right kidney: There is mild right hydronephrosis.  Dimensions: The right kidney measures 10.3 x 5.2 x 7.5 cm.  Vascular:  Portal vein: Flow parameters are within normal limits with hepatopetal flow.  IVC: The IVC is within normal limits.      Impression:  1. The gallbladder appears full but  non-distended. The gallbladder wall measures 3.5 mm in thickness. The sonographic [place] sign is positive. There are two tiny echogenic foci along the gallbladder wall, which measure 9 x 5 x 5 mm and 7 x 3 x 6 mm, which may reflect polyps. Correlate with clinical and laboratory findings as regards additional evaluation and follow-up as the above findings could reflect cholecystitis although the presence of significant ascites decreases sensitivity and specificity.  2. There is mild right hydronephrosis.  3. There is small right pleural effusion. There is mild ascites.  4. Mild hepatomegaly with diffuse fatty infiltration.  5. Details and other findings as noted above.                          Preliminary result by David Tomas MD (04/07/23 23:06:34)                   Narrative:    START OF REPORT:  Technique: Limited right upper quadrant abdominal ultrasound was performed.    Comparison: None.    Clinical History: Cholecystitis.    Findings: There is small right pleural effusion. There is mild ascites.  Liver: The liver is mildly enlarged and measures 20 centimeters in greatest dimension and demonstrates increased echogenicity which may reflect fatty infiltration.  Biliary ducts: The common bile duct is within normal limits at 2.4 mm in diameter.  Gallbladder: The gallbladder appears full but non-distended. The gallbladder wall measures 3.5 mm in thickness. There is no pericholecystic fluid. The sonographic [place] sign is positive. There are two tiny echogenic foci along the gallbladder wall, which measure 9 x 5 x 5 mm and 7 x 3 x 6 mm, which may reflect polyps.  Pancreas: The pancreas cannot be definitively evaluated due to obscuration by bowel gas.  Kidneys:  Right kidney: There is mild right hydronephrosis.  Dimensions: The right kidney measures 10.3 x 5.2 x 7.5 cm.  Vascular:  Portal vein: Flow parameters are within normal limits with hepatopetal flow.  IVC: The IVC is within normal  limits.      Impression:  1. The gallbladder appears full but non-distended. The gallbladder wall measures 3.5 mm in thickness. The sonographic [place] sign is positive. There are two tiny echogenic foci along the gallbladder wall, which measure 9 x 5 x 5 mm and 7 x 3 x 6 mm, which may reflect polyps. Correlate with clinical and laboratory findings as regards additional evaluation and follow-up as the above findings could reflect cholecystitis although the presence of significant ascites decreases sensitivity and specificity.  2. There is mild right hydronephrosis.  3. There is small right pleural effusion. There is mild ascites.  4. Mild hepatomegaly with diffuse fatty infiltration.  5. Details and other findings as noted above.                                         CT Head Without Contrast (Final result)  Result time 04/07/23 14:00:32      Final result by Dong Grant MD (04/07/23 14:00:32)                   Narrative:    EXAMINATION  CT HEAD WITHOUT CONTRAST    CLINICAL HISTORY  Head trauma, moderate-severe;trauma;    TECHNIQUE  Axial non-contrast CT images of the head were acquired and multiplanar reconstructions accomplished by a CT technologist at a separate workstation, pushed to PACS for physician review.    COMPARISON  None available at the time of the initial interpretation.    FINDINGS  Images were reviewed in subdural, brain, soft tissue, and bone windows.    Exam quality: Motion/streak artifact limits assessment of the posterior fossa.    Hemorrhage: No evidence of acute hyperattenuating blood products.    Parenchyma: There is diffuse bilateral supratentorial white matter hypoattenuation, typical of chronic microvascular changes. No discrete mass, mass effect, or CT evidence of acute large vascular territory insult. Gray-white differentiation is preserved.    Midline shift: None.    CSF spaces: Proportional appearance of ventricular and sulcal enlargement. No hydrocephalus. No masses or fluid  collections.    Vasculature: No focally hyperdense artery. Scattered carotid siphon calcifications are present. No abnormal densities within the dural sinuses.    Other findings: No abnormalities of the scalp or subjacent osseous structures. Mastoids are well aerated. No focal abnormality of the sella. The included facial structures are unremarkable.    IMPRESSION  1. No acute intracranial abnormality.  2. Age-related atrophy and chronic sequela of white matter microvascular disease.  3. Additional chronic secondary details discussed above.    RADIATION DOSE  Automated tube current modulation, weight-based exposure dosing, and/or iterative reconstruction technique utilized to reach lowest reasonably achievable exposure rate.    DLP: 956 mGy*cm      Electronically signed by: Dong Grant  Date:    04/07/2023  Time:    14:00                                      CT Abdomen Pelvis With Contrast (Final result)  Result time 04/07/23 14:07:39      Final result by Lissette Lambert MD (04/07/23 14:07:39)                   Impression:      1. Anasarca.  Bilateral pleural effusions, free intraperitoneal fluid, mesenteric edema, retroperitoneal edema, presacral edema, body wall edema  2. Filling defect at the left ventricular apex highly suspicious for thrombus.  Correlate with echocardiogram.  3. Cardiomegaly  4. It is difficult to exclude bowel wall edema particularly at the colon which is decompressed.  5. Severe aortoiliac atherosclerosis  6.  This report was flagged in Epic as abnormal.      Electronically signed by: Lissette Lambert  Date:    04/07/2023  Time:    14:07               Narrative:    EXAMINATION:  CT ABDOMEN PELVIS WITH CONTRAST    CLINICAL HISTORY:  Abdominal pain, acute, nonlocalized;    TECHNIQUE:  Helically acquired images with axial, sagittal and coronal reformations were obtained from the lung bases to the pubic symphysis after the IV administration of contrast.    Automated tube current modulation,  weight-based exposure dosing, and/or iterative reconstruction technique utilized to reach lowest reasonably achievable exposure rate.    DLP: 1651 mGy*cm    COMPARISON:  CT chest 11/04/2020    FINDINGS:  LIMITATIONS: Beam hardening artifact related to placement of patient's arms down by the sides.    HEART: Cardiomegaly.  Filling defect at the left ventricular apex measuring approximately 4.3 cm (2, 11).  Distal aspect of a pacer device is seen at the right atrium and right ventricular apex.    LUNG BASES: There are small bilateral pleural effusions.  Basilar atelectasis.    LIVER: Normal attenuation. No appreciable focal hepatic lesion.    BILIARY: Possible small dependently layering gallstone.    PANCREAS: No inflammatory change.    SPLEEN: Normal in size    ADRENALS: No mass.    KIDNEYS/URETERS: Beam hardening artifact obscures evaluation of the kidneys.  Left renal cyst requires no dedicated imaging follow-up.  No hydronephrosis seen.    GI TRACT/MESENTERY:  No evidence of bowel obstruction.  The colon is decompressed which limits evaluation for wall thickening.    PERITONEUM AND RETROPERITONEUM: There is small volume free intraperitoneal fluid.  There is mesenteric edema.  There is retroperitoneal edema.  Presacral edema.No free air.    LYMPH NODES: No enlarged lymph nodes by size criteria.    VASCULATURE: Severe aortoiliac atherosclerosis.    BLADDER: Normal appearance given degree of distention.    REPRODUCTIVE ORGANS: Normal as visualized.    SOFT TISSUES: Body wall edema.    BONES: No acute osseous abnormality.                                       X-Ray Hip 2 or 3 views Right (with Pelvis when performed) (Final result)  Result time 04/07/23 13:28:25      Final result by Lissette Lambert MD (04/07/23 13:28:25)                   Impression:      No acute osseous abnormality.      Electronically signed by: Lissette Lambert  Date:    04/07/2023  Time:    13:28               Narrative:    EXAMINATION:  XR  HIP WITH PELVIS WHEN PERFORMED, 2 OR 3  VIEWS RIGHT    CLINICAL HISTORY:  Unspecified fall, initial encounter    TECHNIQUE:  AP view of the pelvis and AP and frog leg lateral view of the right hip were performed.    COMPARISON:  None.    FINDINGS:  BONE: No fracture. No dislocation.    SOFT TISSUES: Calcific atherosclerosis.                                    Physical Exam  Constitutional:       General: He is not in acute distress.     Appearance: Normal appearance. He is ill-appearing.   HENT:      Head: Normocephalic.      Mouth/Throat:      Mouth: Mucous membranes are dry.   Eyes:      Conjunctiva/sclera: Conjunctivae normal.   Cardiovascular:      Rate and Rhythm: Normal rate and regular rhythm.      Heart sounds: Normal heart sounds. No murmur heard.  Pulmonary:      Effort: Pulmonary effort is normal. No respiratory distress.      Breath sounds: Examination of the right-lower field reveals decreased breath sounds. Examination of the left-lower field reveals decreased breath sounds. Decreased breath sounds present.   Abdominal:      Palpations: Abdomen is soft.   Skin:     Comments: LLE Cold to Touch, No Dopplerable Pedal Pulse to L Food; Palor Noted   Neurological:      Comments: AMS     Home Medications:   No current facility-administered medications on file prior to encounter.     Current Outpatient Medications on File Prior to Encounter   Medication Sig Dispense Refill    apixaban (ELIQUIS) 5 mg Tab Take 5 mg by mouth.      aspirin (ECOTRIN) 81 MG EC tablet Take 1 tablet (81 mg total) by mouth once daily. 30 tablet 1    atorvastatin (LIPITOR) 80 MG tablet Take 80 mg by mouth.      carvediloL (COREG) 12.5 MG tablet Take 12.5 mg by mouth 2 (two) times daily.      clopidogreL (PLAVIX) 75 mg tablet Take 75 mg by mouth.      famotidine (PEPCID) 20 MG tablet Take 20 mg by mouth.      losartan (COZAAR) 50 MG tablet Take 1 tablet by mouth once daily.      metFORMIN (GLUCOPHAGE) 1000 MG tablet Take 1,000 mg by  mouth.      mupirocin (BACTROBAN) 2 % ointment Apply to nostrils twice daily 1 Tube 0    nitroGLYCERIN (NITROSTAT) 0.4 MG SL tablet DISSOLVE 1 TABLET UNDER THE TONGUE EVERY 5 MINUTES AS NEEDED FOR CHEST PAIN FOR UP TO 3 DOSES. IF NO RELIEF, SEEK MEDICAL ATTENTION      sacubitriL-valsartan (ENTRESTO) 24-26 mg per tablet Take by mouth.       Current Inpatient Medications:    Current Facility-Administered Medications:     0.9%  NaCl infusion (for blood administration), , Intravenous, Q24H PRN, Nahid Sen MD    acetaminophen tablet 1,000 mg, 1,000 mg, Oral, Q6H PRN, Nate Robledo MD    acetaminophen tablet 650 mg, 650 mg, Oral, Q4H PRN, Nate Robledo MD    dextrose 10% bolus 125 mL 125 mL, 12.5 g, Intravenous, PRN, Nate Robledo MD    dextrose 10% bolus 250 mL 250 mL, 25 g, Intravenous, PRN, Nate Robledo MD    dextrose 5 % and 0.45 % NaCl with KCl 40 mEq infusion, , Intravenous, Continuous, Alex Almeida MD    glucagon (human recombinant) injection 1 mg, 1 mg, Intramuscular, PRN, Nate Robledo MD    hydrALAZINE injection 10 mg, 10 mg, Intravenous, Q4H PRN, Nate Robledo MD    insulin aspart U-100 injection 0-5 Units, 0-5 Units, Subcutaneous, Q6H PRN, Nate Robledo MD    lactated ringers bolus 500 mL, 500 mL, Intravenous, Once, Ki Gonzalez MD, Stopped at 04/11/23 1130    lactated ringers infusion, , Intravenous, Continuous, Ki Gonzalez MD, Last Rate: 75 mL/hr at 04/11/23 0830, New Bag at 04/11/23 0830    melatonin tablet 6 mg, 6 mg, Oral, Nightly PRN, Nate Robledo MD    methylPREDNISolone sodium succinate injection 80 mg, 80 mg, Intravenous, Q12H, Esdras Genao MD, 80 mg at 04/11/23 0827    miconazole NITRATE 2 % top powder, , Topical (Top), BID, Nate Robledo MD, Given at 04/11/23 1029    mupirocin 2 % ointment, , Topical (Top), BID, Nate Robledo MD, Given at 04/11/23 0827    nitroGLYCERIN 2% TD oint ointment 1 inch, 1 inch, Topical (Top), Q6H, ZEYNEP Cast, 1 inch at 04/10/23 1800     NORepinephrine 8 mg in dextrose 5% 250 mL infusion, 0-3 mcg/kg/min, Intravenous, Continuous, Darren Womack, , Stopped at 04/11/23 1100    ondansetron injection 4 mg, 4 mg, Intravenous, Q4H PRN, Nate Robledo MD    pantoprazole injection 40 mg, 40 mg, Intravenous, BID, Ki Gonzalez MD    polyethylene glycol packet 17 g, 17 g, Oral, BID PRN, Nate Robledo MD    prochlorperazine injection Soln 5 mg, 5 mg, Intravenous, Q6H PRN, Nate Robledo MD    senna-docusate 8.6-50 mg per tablet 2 tablet, 2 tablet, Oral, BID PRN, Nate Robledo MD    sodium chloride 0.9% flush 10 mL, 10 mL, Intravenous, PRN, Nate Robledo MD    Flushing PICC Protocol, , , Until Discontinued **AND** sodium chloride 0.9% flush 10 mL, 10 mL, Intravenous, Q6H, 10 mL at 04/11/23 0600 **AND** sodium chloride 0.9% flush 10 mL, 10 mL, Intravenous, PRN, Nahid Sen MD    zinc oxide-cod liver oil 40 % paste, , Topical (Top), BID, Nate Robledo MD, Given at 04/11/23 0900    VTE Risk Mitigation (From admission, onward)           Ordered     IP VTE LOW RISK PATIENT  Once         04/07/23 2156     Place sequential compression device  Until discontinued         04/07/23 2156                  Assessment:   NSTEMI - Unspecified (Troponin Max 1.266)  MVCAD s/p CABG (2021)  ICMO/EF 15%  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 - H&H Stable  PAF    - CHADsVASc - 5 Points - 7.2% Stroke Risk per Year   VHD    - ECHO (4.7.23) - Mild MR/TR  HTN  HLD  DM II - A1C 10.7  CINTHIA  Hx CHB s/p Dual Chamber PPM (Unknown Device Brand)  Tobacco Dependence  Electrolyte Derangements - Hypokalemia - Improved   No Hx of GIB    Plan:   Not a Candidate for Revascularization per Vascular Surgery (High Comorbidities and Poor  Targets)  Unable to perform Angiographic Workup secondary to Severe Thrombocytopenia  All Anticoagulation on Hold Secondary to Suspected GIB  Continue Nitropaste to LLE   IVFs per Primary for Hypotension requiring Pressors   Add GDMT when BP Allows  Pts Prognosis is GRIM  Consider Palliative Care vs Hospice Care   Case Discussed with Attending Intensivist  We will be available as needed    MICHELLE Shankar   Cardiology  [facility]  04/11/2023     I have seen the patient, reviewed the Nurse Practitioner's note, assessment and plan. I have personally interviewed and examined the patient at bedside and agree with the findings. Medical decision making listed above were done under my guidance.

## 2023-04-11 NOTE — NURSING
04/11/23 1100        Altered Skin Integrity 04/11/23 0000 Sacral spine Intact skin with non-blanchable redness of localized area   Date First Assessed/Time First Assessed: 04/11/23 0000   Altered Skin Integrity Present on Admission - Did Patient arrive to the hospital with altered skin?: (c) yes  Location: Sacral spine  Description of Altered Skin Integrity: Intact skin with non-...   Wound Image    Drainage Amount None   Appearance Red;Dry   Care   (orders placed)     Missed buttock eval on yesterday.   See photo.   Added zn oxide top cream with abd pad to skin care.   Pt currently in ICU on icu bed and being turned q2hrs.  Will continue to follow every few days.

## 2023-04-11 NOTE — PLAN OF CARE
Problem: Adult Inpatient Plan of Care  Goal: Plan of Care Review  Outcome: Ongoing, Progressing  Goal: Patient-Specific Goal (Individualized)  Outcome: Ongoing, Progressing  Goal: Absence of Hospital-Acquired Illness or Injury  Outcome: Ongoing, Progressing  Goal: Optimal Comfort and Wellbeing  Outcome: Ongoing, Progressing  Goal: Readiness for Transition of Care  Outcome: Ongoing, Progressing     Problem: Diabetes Comorbidity  Goal: Blood Glucose Level Within Targeted Range  Outcome: Ongoing, Progressing     Problem: Skin Injury Risk Increased  Goal: Skin Health and Integrity  Outcome: Ongoing, Progressing     Problem: Impaired Wound Healing  Goal: Optimal Wound Healing  Outcome: Ongoing, Progressing     Problem: Infection  Goal: Absence of Infection Signs and Symptoms  Outcome: Ongoing, Progressing     Problem: Coping Ineffective  Goal: Effective Coping  Outcome: Ongoing, Progressing     Problem: Fall Injury Risk  Goal: Absence of Fall and Fall-Related Injury  Outcome: Ongoing, Progressing     Problem: Restraint, Nonbehavioral (Nonviolent)  Goal: Absence of Harm or Injury  Outcome: Ongoing, Progressing

## 2023-04-11 NOTE — H&P
[facility] - 7th Floor ICU  Pulmonary Critical Care Note    Patient Name: Darren Patel  MRN: [id number]  Admission Date: 4/7/2023  Hospital Length of Stay: 4 days  Code Status: Full Code  Attending Provider: Nate Robledo MD  Primary Care Provider: ZEYNEP Rosales     Subjective:     HPI: 67-year-old male with past medical history of ischemic cardiomyopathy/HFrEF,CAD s/p CABG, paroxysmal AFib on Eliquis, third-degree AV block  s/p dual-chamber pacemaker, PAD/multiple interventions on Plavix, T2DM, HTN, HLD was initially admitted to hospital service  after being found by his neighbor laying on the floor naked.CT head showed no acute intracranial abnormalities. CT abdomen and pelvis show evidence of anasarca with bilateral small pleural effusion, small ascites and mesenteric edema, cardiomegaly and filling defect in the LV apex highly suspicious for thrombus. Echo done during the hospital stay showed LVEF 15%,with  large fixed LV apical thrombus and Right atrial thrombus. Unsure if patient was complaint with eliquis at home but during his stay on the floors he was started on angiomax (given plt in 50s) by cardiology and has been treated for decompensated heart failure with 80 lasix BID and GDMT for 3 days with a net output of 7290 L over last 48 hours.  Patient's BP has been dropping and had gotten 900 ml of fluids with minimal improvement in  SBP ranging in  70s, RRT was called and was decided to upgraded to ICU for further care and management.   Per chart review, on arrival patient had thrombocytopenia and hemeonc was consulted. Earlier today it was noted that patient had hematochezia with suspicion for GI bleed and angiomax was discontinued. Palliative is also on board with the case regarding patient's poor prognosis but unsuccessful attempts to reach family and patient on arrival is alert but is discontented to place and time but person. Palliative working with ethics committee regarding this  patient's case at this time as well.     Hospital Course/Significant events: Upgrade to ICU on 4/10/23      24 Hour Interval History: N/A      Past Medical History:   Diagnosis Date    Diabetes mellitus     High cholesterol     Hypertension        Past Surgical History:   Procedure Laterality Date    A-V CARDIAC PACEMAKER INSERTION Left 11/9/2020    Procedure: INSERTION, CARDIAC PACEMAKER, DUAL CHAMBER;  Surgeon: Nolan Hernandez MD;  Location: [facility] CATH LAB;  Service: Cardiology;  Laterality: Left;    CORONARY ANGIOGRAPHY N/A 11/9/2020    Procedure: ANGIOGRAM, CORONARY ARTERY - right radial;  Surgeon: Nolan Hernandez MD;  Location: [facility] CATH LAB;  Service: Cardiology;  Laterality: N/A;       Social History     Socioeconomic History    Marital status:    Tobacco Use    Smoking status: Some Days     Packs/day: 1.50     Types: Cigarettes    Smokeless tobacco: Never   Substance and Sexual Activity    Alcohol use: Never     Comment: occasionally    Drug use: Never           Current Outpatient Medications   Medication Instructions    apixaban (ELIQUIS) 5 mg, Oral    aspirin (ECOTRIN) 81 mg, Oral, Daily    atorvastatin (LIPITOR) 80 mg, Oral    carvediloL (COREG) 12.5 mg, Oral, 2 times daily    clopidogreL (PLAVIX) 75 mg, Oral    famotidine (PEPCID) 20 mg, Oral    losartan (COZAAR) 50 MG tablet 1 tablet, Oral, Daily    metFORMIN (GLUCOPHAGE) 1,000 mg, Oral    mupirocin (BACTROBAN) 2 % ointment Apply to nostrils twice daily    nitroGLYCERIN (NITROSTAT) 0.4 MG SL tablet DISSOLVE 1 TABLET UNDER THE TONGUE EVERY 5 MINUTES AS NEEDED FOR CHEST PAIN FOR UP TO 3 DOSES. IF NO RELIEF, SEEK MEDICAL ATTENTION    sacubitriL-valsartan (ENTRESTO) 24-26 mg per tablet Oral       Current Inpatient Medications   miconazole NITRATE 2 %   Topical (Top) BID    mupirocin   Topical (Top) BID    nitroGLYCERIN 2% TD oint  1 inch Topical (Top) Q6H    pantoprazole  40 mg Intravenous Daily    potassium chloride in water  20 mEq Intravenous Once     sodium chloride 0.9%  10 mL Intravenous Q6H       Current Intravenous Infusions   dextrose 5 % and 0.45 % NaCl with KCl 40 mEq      NORepinephrine bitartrate-D5W           Review of Systems   Reason unable to perform ROS: unaberlt o obtain due to patient not oriented.        Objective:       Intake/Output Summary (Last 24 hours) at 4/11/2023 0018  Last data filed at 4/10/2023 1458  Gross per 24 hour   Intake --   Output 3340 ml   Net -3340 ml         Vital Signs (Most Recent):  Temp: 97.2 °F (36.2 °C) (04/10/23 1955)  Pulse: 80 (04/10/23 2011)  Resp: 18 (04/10/23 1955)  BP: (!) 75/46 (04/10/23 2025)  SpO2: (!) 90 % (04/10/23 1955)  Body mass index is 25.41 kg/m².  Weight: 85 kg (187 lb 6.3 oz) Vital Signs (24h Range):  Temp:  [96.7 °F (35.9 °C)-98.3 °F (36.8 °C)] 97.2 °F (36.2 °C)  Pulse:  [80-81] 80  Resp:  [18] 18  SpO2:  [90 %-100 %] 90 %  BP: ()/(46-68) 75/46     Physical Exam  HENT:      Head: Normocephalic and atraumatic.      Nose: Nose normal.   Eyes:      Conjunctiva/sclera: Conjunctivae normal.   Cardiovascular:      Rate and Rhythm: Normal rate and regular rhythm.      Heart sounds: Normal heart sounds. No murmur heard.  Pulmonary:      Effort: No respiratory distress.      Breath sounds: Normal breath sounds. No wheezing.      Comments: 3L oxymask   Abdominal:      General: Abdomen is flat.      Palpations: Abdomen is soft.   Genitourinary:     Comments: Prostate laceration/erythema   Musculoskeletal:      Cervical back: Normal range of motion.   Skin:     Comments: Cold LLE with no PT/DP pulses bl   Neurological:      General: No focal deficit present.      Mental Status: He is alert.      Motor: No weakness.      Comments: Oriented to person but not place and time, lethargic          Lines/Drains/Airways       Peripherally Inserted Central Catheter Line  Duration             PICC Double Lumen 04/08/23 1236 right basilic 2 days              Peripheral Intravenous Line  Duration                   Peripheral IV - Single Lumen 04/10/23 0100 22 G Anterior;Proximal;Right Forearm <1 day                    Significant Labs:    Lab Results   Component Value Date    WBC 13.2 (H) 04/10/2023    HGB 11.4 (L) 04/10/2023    HCT 36.5 (L) 04/10/2023    MCV 93.1 04/10/2023    PLT 54 (L) 04/10/2023         BMP  Lab Results   Component Value Date     (H) 04/10/2023    K 3.3 (L) 04/10/2023    CHLORIDE 110 (H) 04/10/2023    CO2 37 (H) 04/10/2023    BUN 44.3 (H) 04/10/2023    CREATININE 1.29 (H) 04/10/2023    CALCIUM 8.5 (L) 04/10/2023    AGAP 10.0 04/10/2023    ESTGFRAFRICA >60 11/10/2020    EGFRNONAA 95 02/25/2022       ABG  Recent Labs   Lab 04/10/23  2025   PH 7.62*   PO2 76*   PCO2 41   HCO3 42.1*       Mechanical Ventilation Support:       Significant Imaging:  I have reviewed the pertinent imaging within the past 24 hours.        Assessment/Plan:     Assessment  Hypovolemic shock likely due to aggressive diuresis  Contraction alkalosis  Hypernatremia/Hyperchloremia/Hypokalemia  likely from above  CINTHIA worsening with diuresis   HFrEF with EF of 15%  CAD s/p CABG  Large LV thrombus , large irregular right atrial thrombus   Carotid arterial disease  Proximal Afib, was on Eliquis at home - unsure compliance  Thrombocytopenia- plt of 54  Concern for lower GI bleed - Hematochezia  Third-degree AV block status post dual-chamber pacemaker   Severe PAD status post multiple interventions in the past with stents on Plavix at home  Left lower extremity critical limb ischemia  Type 2 diabetes  Hypertension  Hyperlipidemia    Plan  Will continue to volume resuscitate at this time along with starting Levophed to improve hypoperfusion  Will monitor blood pressure to keep map greater than 65  Will discontinue Lasix 80 b.i.d. that patient has been getting for 3 days, given elevated bicarb with a pH of 7.6  Repeat ABG pending at 2:00 a.m.  Continue 3 L oxymask, wean as tolerated   Will hold off of coreg and entresto and any BP  medicines given patient is hypotensive  Will continuing holding  off of angiomax given suspicion for LGIB   Will consult GI in the morning for LGIB   Hemeonc on board for thrombocytopenia; No history of heparin exposure and no schistocytes on the peripheral smear, will consider trial of low-dose brought on if platelet count drops tomorrow per Heme-Onc recs  Due to patient's poor prognosis, Palliative on board at this time but patient has no family members to discuss about goals of care; palliative care discussing the case with ethics board committee at this time           DVT Prophylaxis: SCD  GI Prophylaxis: protonix 40      32 minutes of critical care was time spent personally by me on the following activities: development of treatment plan with patient or surrogate and bedside caregivers, discussions with consultants, evaluation of patient's response to treatment, examination of patient, ordering and performing treatments and interventions, ordering and review of laboratory studies, ordering and review of radiographic studies, pulse oximetry, re-evaluation of patient's condition.  This critical care time did not overlap with that of any other provider or involve time for any procedures.     Kiki Guy,   Pulmonary Critical Care Medicine  [facility] - 7th Floor ICU

## 2023-04-11 NOTE — PROGRESS NOTES
Hematology/Oncology  Progress Note    Patient Name: Darren Patel  MRN: [id number]  Admission Date: 4/7/2023  Hospital Length of Stay: 4 days  Attending Provider: Nate Robledo MD  Consulting Provider: Esdras Genao MD  Principal Problem: Thrombocytopenia      Subjective:     HPI:  67-year-old white male smoker with HTN, hyperlipidemia, PAD, DM and extensive cardiac history including ischemic cardiomyopathy, CAD s/p CABG, paroxysmal AFib on chronic Eliquis and third-degree AV block s/p PM.  He presented to the ER 4/7/23 following a syncopal episode at his home.  He does not remember anything up to the episode.  Echocardiogram showed LV enlargement with eccentric hypertrophy and severely decreased systolic function with an EF of 15%.  There was a large ventricular thrombus fixed and located in the apex.  There was also a large irregular right atrial thrombus which was mobile and severe left atrial enlargement. CT A/P showed anasarca with bilateral pleural effusions, ascites, mesenteric and retroperitoneal edema and body wall edema.  There was cardiomegaly with a filling defect in the left ventricular apex. CTA A/P showed extensive arthrosclerotic changes of the abdominal aorta and its branches with bilateral peripheral vascular disease.  CBC on admission showed thrombocytopenia with a platelet count of 57,000. Previous CBC from 2/23/23 showed a normal platelet count of 261,000. He was not on any new medications.  He denied any abnormal bruising or mucocutaneous bleeding.  PT and PTT were both prolonged.  Initial fibrinogen was slightly low but normal on repeat testing.  Due to the urgent need for anticoagulation, he received 1 unit of platelets for 9/23 with platelet count increasing from 40,000 to 75,000.  He was started on bivalirudin for anticoagulation by Cardiology.  I was consulted for a Hematology evaluation.  He was confused and in soft hand mittens.  No family members were present.    He developed ischemic  changes involving the left leg.  He also developed hematochezia and the bivalirudin infusion was discontinued.  Palliative care was consulted due to the severity of his medical condition.  Unable to locate or contact any family members. He was transferred to the ICU around midnight 4/11/23 secondary to hypotension.      Today (4/11/23):  Patient now in the ICU.  He is obtunded and unresponsive.  Off of bivalirudin.  No further bleeding reported by nurses.  On norepinephrine for hypotension.  Platelet count 55,000.      Medications:  Continuous Infusions:   dextrose 5 % and 0.45 % NaCl with KCl 40 mEq      NORepinephrine bitartrate-D5W 0.035 mcg/kg/min (04/11/23 0530)     Scheduled Meds:   miconazole NITRATE 2 %   Topical (Top) BID    mupirocin   Topical (Top) BID    nitroGLYCERIN 2% TD oint  1 inch Topical (Top) Q6H    pantoprazole  40 mg Intravenous Daily    sodium chloride 0.9%  10 mL Intravenous Q6H    zinc oxide-cod liver oil   Topical (Top) BID     PRN Meds:sodium chloride, acetaminophen, acetaminophen, dextrose 10%, dextrose 10%, glucagon (human recombinant), hydrALAZINE, insulin aspart U-100, melatonin, ondansetron, polyethylene glycol, prochlorperazine, senna-docusate 8.6-50 mg, sodium chloride 0.9%, Flushing PICC Protocol **AND** sodium chloride 0.9% **AND** sodium chloride 0.9%     Review of patient's allergies indicates:  No Known Allergies     PMHx:  HTN, HLD, T2DM, CAD, ischemic CM, paroxysmal AFib, third-degree AV block with PM, severe PAD  PSHx:  Pacemaker, CABG, multiple peripheral vascular interventions  SH:  + tobacco 1-1.5 ppd.  Denies alcohol use.  Lives alone in [place].  FH:  Unobtainable.      Review of Systems   Unable to perform ROS: Mental status change       Objective:     Vital Signs (Most Recent):  Temp: 97.5 °F (36.4 °C) (04/11/23 0400)  Pulse: 80 (04/11/23 0600)  Resp: (!) 26 (04/11/23 0600)  BP: (!) 87/53 (04/11/23 0600)  SpO2: 96 % (04/11/23 0600)   Vital Signs (24h Range):  Temp:   [96.7 °F (35.9 °C)-97.5 °F (36.4 °C)] 97.5 °F (36.4 °C)  Pulse:  [] 80  Resp:  [15-27] 26  SpO2:  [90 %-100 %] 96 %  BP: ()/(41-65) 87/53         Physical Exam  Constitutional:       Comments: Thin, obtunded white male   HENT:      Head: Normocephalic.      Mouth/Throat:      Mouth: Mucous membranes are dry.      Pharynx: Oropharynx is clear.   Eyes:      Conjunctiva/sclera: Conjunctivae normal.      Pupils: Pupils are equal, round, and reactive to light.   Cardiovascular:      Rate and Rhythm: Normal rate and regular rhythm.      Heart sounds: No murmur heard.     Comments: Well-healed sternotomy incision.  Pacemaker left chest wall.  Pulmonary:      Comments: Lungs clear anteriorly  Abdominal:      General: Abdomen is flat. There is no distension.      Palpations: Abdomen is soft.      Tenderness: There is no abdominal tenderness.      Comments: Decreased bowel sounds   Genitourinary:     Comments: + Siegel catheter, clear urine  Musculoskeletal:         General: No swelling or tenderness. Normal range of motion.      Cervical back: Neck supple. No tenderness.   Skin:     General: Skin is warm and dry.      Findings: No rash.      Comments: Small left periorbital ecchymoses   Neurological:      Comments: Obtunded, unresponsive       Significant Labs:   CBC:   Recent Labs   Lab 04/10/23  0424 04/10/23  2141 04/11/23  0247   WBC 11.1 13.2* 14.3*   HGB 11.3* 11.4* 12.0*   HCT 36.2* 36.5* 37.4*   PLT 75* 54* 55*      and Coagulation:   Recent Labs   Lab 04/11/23  0247   INR 1.49*         Diagnostic Results:  No new imaging for review    Impression/Recommendations:   IMPRESSION  Acute thrombocytopenia - present on admission  Syncopal episode at home  Left ventricular apical and right atrial thrombi  Ischemic cardiomyopathy (EF 15%)   Severe PAD with ischemic changes LLE  Hypovolemic shock  Acute kidney injury  ? GI bleed      RECOMMENDATIONS  Patient's clinical condition has deteriorated significantly over  the past 24 hours.  Now in the ICU requiring pressor support.  Palliative Care involved, trying to locate family members.  Holding anticoagulation secondary to possible GI bleed.  GI consulted, but patient too ill for invasive procedures.  Platelet count decreased to 55,000 today.  Will give bolus IV Solu-Medrol for 2-3 days.  Antiplatelet antibodies pending.  Prognosis is extremely poor.      IRON DYSON MD  Hematology/Oncology

## 2023-04-11 NOTE — NURSING
Nurses Note -- 4 Eyes      4/11/2023   2:46 AM      Skin assessed during: Transfer      [] No Pressure Injuries Present    []Prevention Measures Documented      [x] Yes- Altered Skin Integrity Present or Discovered   [x] LDA Added if Not in Epic (Describe Wound)   [] New Altered Skin Integrity was Present on Admit and Documented in LDA   [x] Wound Image Taken    Wound Care Consulted? Yes    Attending Nurse:  Kendrick Boone RN     Second RN/Staff Member:  Luke Holt RN

## 2023-04-11 NOTE — CONSULTS
ConsultsPatient Name: Darren Patel   MRN: [id number]   Admission Date: 4/7/2023   Hospital Length of Stay: 4   Attending Provider: Ki Gonzalez MD   Consulting Provider: Prince Valdez M.D.  Reason for Consult: Goals of Care  Primary Care Physician: ZEYNEP Rosales     Principal Problem: Acute on chronic HFrEF (heart failure with reduced ejection fraction)     Patient information was obtained from ER records and primary team.      Final diagnoses:  [W19.XXXA] Fall  [I51.3] Left ventricular thrombus  [R23.1] Pallor  [R23.1] Pallor of extremity  [R53.1] Generalized weakness  [R60.1] Anasarca  [J90] Bilateral pleural effusion  [R74.8] Elevated liver enzymes  [A41.9] Sepsis     Assessment/Plan:     I reviewed the patient's current clinical status with the nurse. I reviewed clinical documentation, labs and imaging.     Symptom management review: pt is lethargic and confused and unable to respond.    I was unable to communicate with the patient's sister, Kim Urrutia, by phone.She states that she did not know that he was in the hospital or that he was sick. She lives next door but states that she doesn't speak to the patient very often. She states that he has bipolar disorder will curse her out if she notes that he hasn't taken his medication. The patient has 2 other brothers.One brother, Bolivar, lives nearby. Kim states that the other brother is not involved and likely won't be interested in assisting the patient in decision-making. The patient has an ex-wife but no children. He has no HCPOA or advanced directives. He has not told his family of his health care wishes. I explained the patient's current clinical situation to his sister in detail. She cried and states that she and brother Bolivar will be coming to the hospital later this evening.     I explained to Ms Alvarez that the patient is unable to make his own healthcare decisions at present. I noted that due to his advanced CHF and coagulation disorder,  he is at high risk of a terminal event such as a cardiac arrest. I explained that in such an event, there is a high risk that he will not survive. I explained some of the risks of treatments such as CPR and shock in this patient with large heart chamber clots and low platelet count. I also noted the risks of anoxic brain injury and the need for long-term ventilator management including trach/ peg. Due to the high risk of non-survival or worsening clinical status with complications, I mentioned the option of a DNR code status or to allow a natural death if a cardiac arrest occurred. She stated that she could not make this decision without her brother Bolivar. I recommended a family meeting with them both tomorrow. She stated that she can't guarantee that they will both be able to come tomorrow. I suggested a three way call tomorrow after she speaks to Bolivar on her own tonight. She seemed distant in the conversation and did not readily respond. I will reach out to them tomorrow to attempt to review goals again.    History of Present Illness:     68 y/o WM h/o severe PAD, DM, ICM with EF 15%, CAD, FAF, currently admitted after found unresponsive in his home. He is found by ECHO to have a large left ventricular thrombus and large Rt atrial thrombus. We were consulted to review goals of care.      Active Ambulatory Problems     Diagnosis Date Noted    Complete heart block 11/06/2020    Essential hypertension 11/07/2020    Coronary artery disease involving native coronary artery of native heart without angina pectoris 11/09/2020    Type 2 diabetes mellitus without complication, without long-term current use of insulin 11/10/2020    Cardiac pacemaker in situ 02/22/2021    Ischemic cardiomyopathy 05/03/2022    Paroxysmal atrial fibrillation 05/03/2022    Mixed hyperlipidemia 05/03/2022    Tobacco abuse 05/03/2022    Heart failure with reduced ejection fraction 04/20/2021    Peripheral arterial disease 02/23/2023    Obesity  02/23/2023     Resolved Ambulatory Problems     Diagnosis Date Noted    No Resolved Ambulatory Problems     Past Medical History:   Diagnosis Date    Diabetes mellitus     High cholesterol     Hypertension         Past Surgical History:   Procedure Laterality Date    A-V CARDIAC PACEMAKER INSERTION Left 11/9/2020    Procedure: INSERTION, CARDIAC PACEMAKER, DUAL CHAMBER;  Surgeon: Nolan Hernandez MD;  Location: [facility] CATH LAB;  Service: Cardiology;  Laterality: Left;    CORONARY ANGIOGRAPHY N/A 11/9/2020    Procedure: ANGIOGRAM, CORONARY ARTERY - right radial;  Surgeon: Nolan Hernandez MD;  Location: [facility] CATH LAB;  Service: Cardiology;  Laterality: N/A;        Review of patient's allergies indicates:  No Known Allergies       Current Facility-Administered Medications:     0.9%  NaCl infusion (for blood administration), , Intravenous, Q24H PRN, Nahid Sen MD    acetaminophen tablet 1,000 mg, 1,000 mg, Oral, Q6H PRN, Nate Robledo MD    acetaminophen tablet 650 mg, 650 mg, Oral, Q4H PRN, Nate Robledo MD    dextrose 10% bolus 125 mL 125 mL, 12.5 g, Intravenous, PRN, Nate Robledo MD    dextrose 10% bolus 250 mL 250 mL, 25 g, Intravenous, PRN, Nate Robledo MD    dextrose 5 % and 0.45 % NaCl with KCl 40 mEq infusion, , Intravenous, Continuous, Alex Almeida MD    glucagon (human recombinant) injection 1 mg, 1 mg, Intramuscular, PRN, Nate Robledo MD    hydrALAZINE injection 10 mg, 10 mg, Intravenous, Q4H PRN, Nate Robledo MD    insulin aspart U-100 injection 0-5 Units, 0-5 Units, Subcutaneous, Q6H PRN, Nate Robledo MD    lactated ringers infusion, , Intravenous, Continuous, Dong Nina MD, Last Rate: 75 mL/hr at 04/11/23 1322, New Bag at 04/11/23 1322    melatonin tablet 6 mg, 6 mg, Oral, Nightly PRN, Nate Robledo MD    methylPREDNISolone sodium succinate injection 80 mg, 80 mg, Intravenous, Q12H, Esdras Genao MD, 80 mg at 04/11/23 0827    miconazole NITRATE 2 % top powder, , Topical (Top), BID, Nate Robledo MD,  "Given at 04/11/23 1029    mupirocin 2 % ointment, , Topical (Top), BID, Nate Robledo MD, Given at 04/11/23 0827    nitroGLYCERIN 2% TD oint ointment 1 inch, 1 inch, Topical (Top), Q6H, Monica Hendrickson, FNP, 1 inch at 04/11/23 1126    NORepinephrine 8 mg in dextrose 5% 250 mL infusion, 0-3 mcg/kg/min, Intravenous, Continuous, Darren Womack DO, Stopped at 04/11/23 1100    ondansetron injection 4 mg, 4 mg, Intravenous, Q4H PRN, Nate Robledo MD    pantoprazole injection 40 mg, 40 mg, Intravenous, BID, Ki Gonzalez MD    polyethylene glycol packet 17 g, 17 g, Oral, BID PRN, Nate Robledo MD    prochlorperazine injection Soln 5 mg, 5 mg, Intravenous, Q6H PRN, Nate Robledo MD    senna-docusate 8.6-50 mg per tablet 2 tablet, 2 tablet, Oral, BID PRN, Nate Robledo MD    sodium chloride 0.9% flush 10 mL, 10 mL, Intravenous, PRN, Nate Robledo MD    Flushing PICC Protocol, , , Until Discontinued **AND** sodium chloride 0.9% flush 10 mL, 10 mL, Intravenous, Q6H, 10 mL at 04/11/23 1127 **AND** sodium chloride 0.9% flush 10 mL, 10 mL, Intravenous, PRN, Nahid Sen MD    zinc oxide-cod liver oil 40 % paste, , Topical (Top), BID, Nate Robledo MD, Given at 04/11/23 0900     sodium chloride, acetaminophen, acetaminophen, dextrose 10%, dextrose 10%, glucagon (human recombinant), hydrALAZINE, insulin aspart U-100, melatonin, ondansetron, polyethylene glycol, prochlorperazine, senna-docusate 8.6-50 mg, sodium chloride 0.9%, Flushing PICC Protocol **AND** sodium chloride 0.9% **AND** sodium chloride 0.9%     Family History   Family history unknown: Yes        Review of Systems   Unable to perform ROS: Mental status change          Objective:   BP (!) 96/53   Pulse 80   Temp 98.3 °F (36.8 °C) (Oral)   Resp 13   Ht 6' 0.01" (1.829 m)   Wt 85 kg (187 lb 6.3 oz)   SpO2 96%   BMI 25.41 kg/m²      Physical Exam  Constitutional:       General: He is not in acute distress.     Appearance: He is ill-appearing and toxic-appearing. "   HENT:      Head: Normocephalic.      Right Ear: External ear normal.      Left Ear: External ear normal.      Nose: Nose normal.      Mouth/Throat:      Mouth: Mucous membranes are moist.      Pharynx: Oropharynx is clear.   Eyes:      Extraocular Movements: Extraocular movements intact.      Conjunctiva/sclera: Conjunctivae normal.      Pupils: Pupils are equal, round, and reactive to light.   Cardiovascular:      Rate and Rhythm: Normal rate and regular rhythm.      Pulses: Normal pulses.      Heart sounds: Normal heart sounds.   Pulmonary:      Effort: Pulmonary effort is normal.      Breath sounds: Normal breath sounds.   Abdominal:      General: Abdomen is flat. Bowel sounds are normal. There is no distension.      Palpations: Abdomen is soft.      Tenderness: There is no abdominal tenderness.   Musculoskeletal:      Right lower leg: No edema.      Left lower leg: No edema.   Skin:     General: Skin is warm.      Comments: Left great toe is black some general bluish discoloration to the foot noted.   Neurological:      General: No focal deficit present.      Mental Status: He is disoriented.          Review of Symptoms  Review of Symptoms      Symptom Assessment (ESAS 0-10 Scale)  Unable to complete assessment due to Mental status change     CAM / Delirium:  Positive  Constipation:  Negative  Diarrhea:  Negative      Bowel Management Plan (BMP):  Yes      Pain Assessment in Advanced Demential Scale (PAINAD)   Breathing - Independent of vocalization:  0  Negative vocalization:  0  Facial expression:  0  Body language:  0  Consolability:  0  Total:  0    Modified Chandler Scale:  0    Performance Status:  20    Living Arrangements:  Lives alone and Lives in home    Psychosocial/Cultural:   See Palliative Psychosocial Note: No  **Primary  to Follow**  Palliative Care  Consult: No    Spiritual:  F - Ines and Belief:  Unable to obtain    Advance Care Planning   Advance Directives:   Agent's  Name:  SisterKim   Agent's Contact Number:  [phone]    Decision Making:  Patient unable to communicate due to disease severity/cognitive impairment          > 50% of 35 min of encounter was spent in chart review, face to face discussion of goals of care, symptom assessment, coordination of care and emotional support.     Additional 17 min spent in voluntary ACP discussion with family.    Prince Valdez M.D.  Palliative Medicine  [facility]

## 2023-04-11 NOTE — NURSING
RRT was called around 2000 d/t patient BP in the 50-60s systolic. Verbal order was taken from  to bolus 500cc NS and reevaluate after the bolus. Half way through the bolus, pressure was recycled and was still in the 60s sytolic. RR team arrived and ICU  decided with Hosp NP that the patient needed fluids, ordered 1L LR. ABGs and Lactic Acid labs were drawn, results seen by [facility] ICU  . Ellen Figueroa, NP ordered CBC,BMP, and Mag. Results were reported once they resulted. Half way through the LR bolus, the patient's BP was still in the 70s systolic, another RRT was called. It was decided that the patient required more fluids and would be transferred to a higher level of care.

## 2023-04-11 NOTE — CONSULTS
Gastroenterology Consultation Note    Reason for Consult:  GI bleed    PCP:   ZEYNEP Rosales    Referring MD:  Nate Robledo MD    Hospital Day: 4     Initial History of Present Illness (HPI):  This is a 67 y.o. male unknown to our group with PMH of ischemic cardiomyopathy/HFrEF with EF 15%,CAD s/p CABG, paroxysmal AFib on Eliquis, third-degree AV block s/p dual-chamber pacemaker, PAD/multiple interventions on Plavix, T2DM, HTN, HLD. Patient was admitted to [facility] 04/07/23 for LV apical thrombus, anasarca, thrombocytopenia [started on angiomax], unwitnessed fall. Hospital course complicated by hypovolemic shock d/t aggressive diuresis for CHF, electrolyte imbalances, CINTHIA.     Patient developed hematochezia today. Angiomax discontinued. Most recent labs include WBC 14.3, H&H 12.0/37.4, plt 55, INR 1.49. GI consulted for GI bleed.    US abd 04/07/23: sonographic [place] sign positive, possible polyps x2 in gallbladder, mild hepatomegaly with diffuse fatty infiltration  CT abd/pel 04/07/23: anasarca, LV apical filling defect suspicious for thrombus, cardiomegaly, decompression of colon    Spoke with nurse regarding patient. Patient mostly unresponsive. No known family, his case is being evaluated by hospital ethics committee. Nurse changed patient earlier and noted clear pink-tinged fluid coming from rectum, she described it as serosanguinous.     Patient on oxymask at 5L. Abd soft. No stooling to evaluate at this time.     ROS:  Review of Systems   Unable to perform ROS: Medical condition     Medical History:   Past Medical History:   Diagnosis Date    Diabetes mellitus     High cholesterol     Hypertension        Surgical History:   Past Surgical History:   Procedure Laterality Date    A-V CARDIAC PACEMAKER INSERTION Left 11/9/2020    Procedure: INSERTION, CARDIAC PACEMAKER, DUAL CHAMBER;  Surgeon: Nolan Hernandez MD;  Location: [facility] CATH LAB;  Service: Cardiology;  Laterality: Left;    CORONARY ANGIOGRAPHY N/A  "11/9/2020    Procedure: ANGIOGRAM, CORONARY ARTERY - right radial;  Surgeon: Nolan Hernandez MD;  Location: [facility] CATH LAB;  Service: Cardiology;  Laterality: N/A;       Family History:   Family History   Family history unknown: Yes   .     Social History:   Social History     Tobacco Use    Smoking status: Some Days     Packs/day: 1.50     Types: Cigarettes    Smokeless tobacco: Never   Substance Use Topics    Alcohol use: Never     Comment: occasionally       Allergies:  Review of patient's allergies indicates:  No Known Allergies    Medications Prior to Admission   Medication Sig Dispense Refill Last Dose    apixaban (ELIQUIS) 5 mg Tab Take 5 mg by mouth.   Unknown    aspirin (ECOTRIN) 81 MG EC tablet Take 1 tablet (81 mg total) by mouth once daily. 30 tablet 1     atorvastatin (LIPITOR) 80 MG tablet Take 80 mg by mouth.   Unknown    carvediloL (COREG) 12.5 MG tablet Take 12.5 mg by mouth 2 (two) times daily.   Unknown    clopidogreL (PLAVIX) 75 mg tablet Take 75 mg by mouth.   Unknown    famotidine (PEPCID) 20 MG tablet Take 20 mg by mouth.   Unknown    losartan (COZAAR) 50 MG tablet Take 1 tablet by mouth once daily.       metFORMIN (GLUCOPHAGE) 1000 MG tablet Take 1,000 mg by mouth.   Unknown    mupirocin (BACTROBAN) 2 % ointment Apply to nostrils twice daily 1 Tube 0     nitroGLYCERIN (NITROSTAT) 0.4 MG SL tablet DISSOLVE 1 TABLET UNDER THE TONGUE EVERY 5 MINUTES AS NEEDED FOR CHEST PAIN FOR UP TO 3 DOSES. IF NO RELIEF, SEEK MEDICAL ATTENTION   Unknown    sacubitriL-valsartan (ENTRESTO) 24-26 mg per tablet Take by mouth.   Unknown         Objective Findings:    Vital Signs:  /67   Pulse 80   Temp 98.4 °F (36.9 °C) (Oral)   Resp 20   Ht 6' 0.01" (1.829 m)   Wt 85 kg (187 lb 6.3 oz)   SpO2 95%   BMI 25.41 kg/m²   Body mass index is 25.41 kg/m².    Physical Exam:  Physical Exam  Constitutional:       General: He is not in acute distress.     Appearance: He is obese. He is ill-appearing.   HENT:      " Head: Normocephalic and atraumatic.      Right Ear: External ear normal.      Left Ear: External ear normal.      Mouth/Throat:      Mouth: Mucous membranes are dry.      Comments: oxymask  Eyes:      Conjunctiva/sclera: Conjunctivae normal.   Pulmonary:      Effort: No respiratory distress.      Comments: Oxymask at 5L  Abdominal:      General: Bowel sounds are normal. There is no distension.      Palpations: Abdomen is soft.      Tenderness: There is generalized abdominal tenderness. There is guarding.   Skin:     General: Skin is warm and dry.      Coloration: Skin is pale.       Labs:  Recent Results (from the past 24 hour(s))   Lactic Acid, Plasma    Collection Time: 04/10/23 10:34 AM   Result Value Ref Range    Lactic Acid Level 2.0 0.5 - 2.2 mmol/L   Occult Blood Stool, CA Screen    Collection Time: 04/10/23  6:13 PM   Result Value Ref Range    Stool Color 1 Red     Stool Consistancy 1 Mucoid     Occult Blood Stool 1 Positive (A) Negative   APTT    Collection Time: 04/10/23  6:17 PM   Result Value Ref Range    PTT 57.3 (H) 23.2 - 33.7 seconds   POCT glucose    Collection Time: 04/10/23  7:59 PM   Result Value Ref Range    POCT Glucose 144 (H) 70 - 110 mg/dL   POCT ARTERIAL BLOOD GAS    Collection Time: 04/10/23  8:25 PM   Result Value Ref Range    POC PH 7.62 (AA) 7.29 - 7.60    POC PCO2 41 35 - 45 mmHg    POC PO2 76 (A) 80 - 100 mmHg    POC HEMOGLOBIN 10.9 (A) 12.0 - 16.0 g/dL    POC SATURATED O2 97.2 %    POC O2Hb 95.0 94.0 - 97.0 %    POC COHb 2.9 %    POC MetHb 0.50 0.40 - 1.5 %    POC Potassium 2.7 (A) 3.5 - 5.0 mmol/l    POC Sodium 149 (A) 137 - 145 mmol/l    POC Ionized Calcium 1.09 (A) 1.12 - 1.23 mmol/l    Correct Temperature (PH) 7.62 (AA) 7.29 - 7.60    Corrected Temperature (pCO2) 41 35 - 45 mmHg    Corrected Temperature (pO2) 76 (A) 80 - 100 mmHg    POC HCO3 42.1 (A) 22.0 - 26.0 mmol/l    Base Deficit 19.0 (A) -2.00 - 2.00 mmol/l    POC Temp 37.0 °C    Specimen source Arterial sample    Lactic  acid, plasma    Collection Time: 04/10/23  8:53 PM   Result Value Ref Range    Lactic Acid Level 2.0 0.5 - 2.2 mmol/L   Basic Metabolic Panel    Collection Time: 04/10/23  9:41 PM   Result Value Ref Range    Sodium Level 157 (H) 136 - 145 mmol/L    Potassium Level 3.3 (L) 3.5 - 5.1 mmol/L    Chloride 110 (H) 98 - 107 mmol/L    Carbon Dioxide 37 (H) 23 - 31 mmol/L    Glucose Level 150 (H) 82 - 115 mg/dL    Blood Urea Nitrogen 44.3 (H) 8.4 - 25.7 mg/dL    Creatinine 1.29 (H) 0.73 - 1.18 mg/dL    BUN/Creatinine Ratio 34     Calcium Level Total 8.5 (L) 8.8 - 10.0 mg/dL    Anion Gap 10.0 mEq/L    eGFR 60 mls/min/1.73/m2   Magnesium    Collection Time: 04/10/23  9:41 PM   Result Value Ref Range    Magnesium Level 2.00 1.60 - 2.60 mg/dL   CBC with Differential    Collection Time: 04/10/23  9:41 PM   Result Value Ref Range    WBC 13.2 (H) 4.5 - 11.5 x10(3)/mcL    RBC 3.92 (L) 4.70 - 6.10 x10(6)/mcL    Hgb 11.4 (L) 14.0 - 18.0 g/dL    Hct 36.5 (L) 42.0 - 52.0 %    MCV 93.1 80.0 - 94.0 fL    MCH 29.1 27.0 - 31.0 pg    MCHC 31.2 (L) 33.0 - 36.0 g/dL    RDW 21.5 (H) 11.5 - 17.0 %    Platelet 54 (L) 130 - 400 x10(3)/mcL    MPV      Neut % 82.2 %    Lymph % 8.3 %    Mono % 7.2 %    Eos % 0.2 %    Basophil % 0.4 %    Lymph # 1.10 0.6 - 4.6 x10(3)/mcL    Neut # 10.90 (H) 2.1 - 9.2 x10(3)/mcL    Mono # 0.95 0.1 - 1.3 x10(3)/mcL    Eos # 0.02 0 - 0.9 x10(3)/mcL    Baso # 0.05 0 - 0.2 x10(3)/mcL    IG# 0.22 (H) 0 - 0.04 x10(3)/mcL    IG% 1.7 %    NRBC% 0.2 %   Troponin I    Collection Time: 04/10/23  9:41 PM   Result Value Ref Range    Troponin-I 2.099 (H) 0.000 - 0.045 ng/mL   POCT glucose    Collection Time: 04/10/23 11:24 PM   Result Value Ref Range    POCT Glucose 141 (H) 70 - 110 mg/dL   POCT ARTERIAL BLOOD GAS    Collection Time: 04/11/23  2:20 AM   Result Value Ref Range    POC PH 7.62 (AA) 7.29 - 7.60    POC PCO2 38 35 - 45 mmHg    POC PO2 75 (A) 80 - 100 mmHg    POC HEMOGLOBIN 11.8 (A) 12.0 - 16.0 g/dL    POC SATURATED O2  97.2 %    POC O2Hb 94.2 94.0 - 97.0 %    POC COHb 2.5 %    POC MetHb 1.1 0.40 - 1.5 %    POC Potassium 3.3 (A) 3.5 - 5.0 mmol/l    POC Sodium 149 (A) 137 - 145 mmol/l    POC Ionized Calcium 1.15 1.12 - 1.23 mmol/l    Correct Temperature (PH) 7.62 (AA) 7.29 - 7.60    Corrected Temperature (pCO2) 38 35 - 45 mmHg    Corrected Temperature (pO2) 75 (A) 80 - 100 mmHg    POC HCO3 39.1 (A) 22.0 - 26.0 mmol/l    Base Deficit 16.5 (A) -2.00 - 2.00 mmol/l    POC Temp 37.0 °C    Specimen source Arterial sample    Fibrinogen    Collection Time: 04/11/23  2:47 AM   Result Value Ref Range    Fibrinogen 406.0 210.0 - 463.0 mg/dL   Protime-INR    Collection Time: 04/11/23  2:47 AM   Result Value Ref Range    PT 17.8 (H) 12.5 - 14.5 seconds    INR 1.49 (H) 0.00 - 1.30   Magnesium    Collection Time: 04/11/23  2:47 AM   Result Value Ref Range    Magnesium Level 2.00 1.60 - 2.60 mg/dL   Lactic Acid, Plasma    Collection Time: 04/11/23  2:47 AM   Result Value Ref Range    Lactic Acid Level 2.0 0.5 - 2.2 mmol/L   Phosphorus    Collection Time: 04/11/23  2:47 AM   Result Value Ref Range    Phosphorus Level 2.6 2.3 - 4.7 mg/dL   CBC with Differential    Collection Time: 04/11/23  2:47 AM   Result Value Ref Range    WBC 14.3 (H) 4.5 - 11.5 x10(3)/mcL    RBC 4.13 (L) 4.70 - 6.10 x10(6)/mcL    Hgb 12.0 (L) 14.0 - 18.0 g/dL    Hct 37.4 (L) 42.0 - 52.0 %    MCV 90.6 80.0 - 94.0 fL    MCH 29.1 27.0 - 31.0 pg    MCHC 32.1 (L) 33.0 - 36.0 g/dL    RDW 21.4 (H) 11.5 - 17.0 %    Platelet 55 (L) 130 - 400 x10(3)/mcL    MPV      Neut % 81.2 %    Lymph % 8.4 %    Mono % 8.3 %    Eos % 0.1 %    Basophil % 0.6 %    Lymph # 1.20 0.6 - 4.6 x10(3)/mcL    Neut # 11.59 (H) 2.1 - 9.2 x10(3)/mcL    Mono # 1.19 0.1 - 1.3 x10(3)/mcL    Eos # 0.01 0 - 0.9 x10(3)/mcL    Baso # 0.08 0 - 0.2 x10(3)/mcL    IG# 0.20 (H) 0 - 0.04 x10(3)/mcL    IG% 1.4 %    NRBC% 0.4 %   Troponin I    Collection Time: 04/11/23  2:47 AM   Result Value Ref Range    Troponin-I 2.015 (H)  0.000 - 0.045 ng/mL   Comprehensive Metabolic Panel    Collection Time: 04/11/23  2:47 AM   Result Value Ref Range    Sodium Level 156 (H) 136 - 145 mmol/L    Potassium Level 3.8 3.5 - 5.1 mmol/L    Chloride 110 (H) 98 - 107 mmol/L    Carbon Dioxide 33 (H) 23 - 31 mmol/L    Glucose Level 150 (H) 82 - 115 mg/dL    Blood Urea Nitrogen 42.5 (H) 8.4 - 25.7 mg/dL    Creatinine 1.26 (H) 0.73 - 1.18 mg/dL    Calcium Level Total 8.7 (L) 8.8 - 10.0 mg/dL    Protein Total 5.2 (L) 5.8 - 7.6 gm/dL    Albumin Level 1.8 (L) 3.4 - 4.8 g/dL    Globulin 3.4 2.4 - 3.5 gm/dL    Albumin/Globulin Ratio 0.5 (L) 1.1 - 2.0 ratio    Bilirubin Total 5.2 (H) <=1.5 mg/dL    Alkaline Phosphatase 85 40 - 150 unit/L    Alanine Aminotransferase 92 (H) 0 - 55 unit/L    Aspartate Aminotransferase 87 (H) 5 - 34 unit/L    eGFR >60 mls/min/1.73/m2       CTA Runoff ABD PEL Bilat Lower Ext   Final Result      1. Extensive atherosclerotic changes of the abdominal aorta and its branches with bilateral peripheral vascular disease.   2. Minimal scattered bilateral three-vessel runoff.         Electronically signed by: Franchesca Valdez   Date:    04/08/2023   Time:    15:49      X-Ray Chest 1 View   Final Result      Right-sided PICC line with tip over the superior vena cava.         Electronically signed by: Franchesca Valdez   Date:    04/08/2023   Time:    13:28      X-Ray Chest 1 View   Final Result      Small left pleural effusion.         Electronically signed by: Franchesca Valdez   Date:    04/08/2023   Time:    09:13      US Abdomen Limited   Final Result   Impression:      1. The gallbladder appears full but non-distended. The gallbladder wall measures 3.5 mm in thickness. The sonographic [place] sign is positive. There are two tiny echogenic foci along the gallbladder wall, which measure 9 x 5 x 5 mm and 7 x 3 x 6 mm, which may reflect polyps. Correlate with clinical and laboratory findings as regards additional evaluation and follow-up as the above  findings could reflect cholecystitis although the presence of significant ascites decreases sensitivity and specificity.      2. There is mild right hydronephrosis.      3. There is small right pleural effusion. There is mild ascites.      4. Mild hepatomegaly with diffuse fatty infiltration.      5. Details and other findings as noted above.      No significant discrepancy with overnight report.         Electronically signed by: Noel Yoon   Date:    04/08/2023   Time:    09:35      CT Head Without Contrast   Final Result      CT Abdomen Pelvis With Contrast   Final Result   Abnormal      1. Anasarca.  Bilateral pleural effusions, free intraperitoneal fluid, mesenteric edema, retroperitoneal edema, presacral edema, body wall edema   2. Filling defect at the left ventricular apex highly suspicious for thrombus.  Correlate with echocardiogram.   3. Cardiomegaly   4. It is difficult to exclude bowel wall edema particularly at the colon which is decompressed.   5. Severe aortoiliac atherosclerosis   6.  This report was flagged in Epic as abnormal.         Electronically signed by: Lissette Lambert   Date:    04/07/2023   Time:    14:07      X-Ray Hip 2 or 3 views Right (with Pelvis when performed)   Final Result      No acute osseous abnormality.         Electronically signed by: Lissette Lambert   Date:    04/07/2023   Time:    13:28            Assessment/Plan:  This is a 67 y.o. male unknown to our group with PMH of ischemic cardiomyopathy/HFrEF with EF 15%,CAD s/p CABG, paroxysmal AFib on Eliquis, third-degree AV block s/p dual-chamber pacemaker, PAD/multiple interventions on Plavix, T2DM, HTN, HLD. Patient was admitted to [facility] 04/07/23 for LV apical thrombus, anasarca, thrombocytopenia [started on angiomax], unwitnessed fall. Hospital course complicated by hypovolemic shock d/t aggressive diuresis for CHF, electrolyte imbalances, CINTHIA.     Patient developed hematochezia today. GI consulted for GI bleed.    GI  bleed?  - per nurse, serosanguinous fluid from rectum  - H&H stable at this time  - monitor and transfuse as needed to keep Hgb >7-8  - monitor stool for color/blood  - PPI BID  - given patient's comorbidities, especially in light of stable H&H and lack of overt GI bleeding, will hold off on endoscopy at this time  - supportive care  - we will continue to monitor  2. Hypovolemic shock 2/2 aggressive diuresis  - requiring NE drip  - on 5L oxymask  3. HFrEF  - EF 15%  4. Afib  - on Eliquis - held at this time  5. PAD  - on Plavix - held at this time    Thank you for allowing us to participate in the care of Darren Patel.    EMILIANO KingC  Gastroenterology  [facility]

## 2023-04-12 NOTE — PROGRESS NOTES
[facility] - 7th Floor ICU  Pulmonary Critical Care Note    Patient Name: Darren Patel  MRN: [id number]  Admission Date: 4/7/2023  Hospital Length of Stay: 5 days  Code Status: Full Code  Attending Provider: Ki Gonzalez MD  Primary Care Provider: ZEYNEP Rosales     Subjective:     HPI: 67-year-old male with past medical history of ischemic cardiomyopathy/HFrEF,CAD s/p CABG, paroxysmal AFib on Eliquis, third-degree AV block  s/p dual-chamber pacemaker, PAD/multiple interventions on Plavix, T2DM, HTN, HLD was initially admitted to hospital service  after being found by his neighbor laying on the floor naked.CT head showed no acute intracranial abnormalities. CT abdomen and pelvis show evidence of anasarca with bilateral small pleural effusion, small ascites and mesenteric edema, cardiomegaly and filling defect in the LV apex highly suspicious for thrombus. Echo done during the hospital stay showed LVEF 15%,with  large fixed LV apical thrombus and Right atrial thrombus. Unsure if patient was complaint with eliquis at home but during his stay on the floors he was started on angiomax (given plt in 50s) by cardiology and has been treated for decompensated heart failure with 80 lasix BID and GDMT for 3 days with a net output of 7290 L over last 48 hours.  Patient's BP has been dropping and had gotten 900 ml of fluids with minimal improvement in  SBP ranging in  70s, RRT was called and was decided to upgraded to ICU for further care and management.   Per chart review, on arrival patient had thrombocytopenia and hemeonc was consulted. Earlier today it was noted that patient had hematochezia with suspicion for GI bleed and angiomax was discontinued. Palliative is also on board with the case regarding patient's poor prognosis but unsuccessful attempts to reach family and patient on arrival is alert but is discontented to place and time but person. Palliative working with ethics committee regarding  this patient's case at this time as well.         Hospital Course/Significant events:   Upgrade to ICU on 4/10/23      24 Hour Interval History: N/A  Developed some worsening hemodynamic instability yesterday requiring restarting norepinephrine, as well as some worsening obtundation.  Renal function slightly worse this morning, 800 cc urine output noted yesterday.  Leukocytosis worsened to 21 this morning, but remains afebrile.  Started on Zosyn overnight for coverage of possible infectious process.  Continues to have significant left lower extremity mottling/evidence of poor perfusion.      Past Medical History:   Diagnosis Date    Diabetes mellitus     High cholesterol     Hypertension        Past Surgical History:   Procedure Laterality Date    A-V CARDIAC PACEMAKER INSERTION Left 11/9/2020    Procedure: INSERTION, CARDIAC PACEMAKER, DUAL CHAMBER;  Surgeon: Nolan Hernandez MD;  Location: [facility] CATH LAB;  Service: Cardiology;  Laterality: Left;    CORONARY ANGIOGRAPHY N/A 11/9/2020    Procedure: ANGIOGRAM, CORONARY ARTERY - right radial;  Surgeon: Nolan Hernandez MD;  Location: [facility] CATH LAB;  Service: Cardiology;  Laterality: N/A;       Social History     Socioeconomic History    Marital status:    Tobacco Use    Smoking status: Some Days     Packs/day: 1.50     Types: Cigarettes    Smokeless tobacco: Never   Substance and Sexual Activity    Alcohol use: Never     Comment: occasionally    Drug use: Never     Social Determinants of Health     Social Connections: Unknown    Marital Status:            Current Outpatient Medications   Medication Instructions    apixaban (ELIQUIS) 5 mg, Oral    aspirin (ECOTRIN) 81 mg, Oral, Daily    atorvastatin (LIPITOR) 80 mg, Oral    carvediloL (COREG) 12.5 mg, Oral, 2 times daily    clopidogreL (PLAVIX) 75 mg, Oral    famotidine (PEPCID) 20 mg, Oral    losartan (COZAAR) 50 MG tablet 1 tablet, Oral, Daily    metFORMIN (GLUCOPHAGE) 1,000 mg, Oral    mupirocin  (BACTROBAN) 2 % ointment Apply to nostrils twice daily    nitroGLYCERIN (NITROSTAT) 0.4 MG SL tablet DISSOLVE 1 TABLET UNDER THE TONGUE EVERY 5 MINUTES AS NEEDED FOR CHEST PAIN FOR UP TO 3 DOSES. IF NO RELIEF, SEEK MEDICAL ATTENTION    sacubitriL-valsartan (ENTRESTO) 24-26 mg per tablet Oral       Current Inpatient Medications   methylPREDNISolone sodium succinate injection  80 mg Intravenous Q12H    miconazole NITRATE 2 %   Topical (Top) BID    mupirocin   Topical (Top) BID    nitroGLYCERIN 2% TD oint  1 inch Topical (Top) Q6H    pantoprazole  40 mg Intravenous BID    piperacillin-tazobactam (ZOSYN) IVPB  4.5 g Intravenous Q8H    sodium chloride 0.9%  10 mL Intravenous Q6H    zinc oxide-cod liver oil   Topical (Top) BID       Current Intravenous Infusions   dextrose 5 % (D5W) 75 mL/hr at 04/12/23 0913    NORepinephrine bitartrate-D5W 0.2 mcg/kg/min (04/12/23 0915)         Review of systems unobtainable due to altered mental status.        Objective:       Intake/Output Summary (Last 24 hours) at 4/12/2023 0931  Last data filed at 4/12/2023 0800  Gross per 24 hour   Intake 2428 ml   Output 700 ml   Net 1728 ml           Vital Signs (Most Recent):  Temp: 99 °F (37.2 °C) (04/12/23 0800)  Pulse: 80 (04/12/23 0915)  Resp: (!) 32 (04/12/23 0915)  BP: (!) 50/37 (04/12/23 0915)  SpO2: 96 % (04/12/23 0915)  Body mass index is 25.41 kg/m².  Weight: 85 kg (187 lb 6.3 oz) Vital Signs (24h Range):  Temp:  [97.7 °F (36.5 °C)-99 °F (37.2 °C)] 99 °F (37.2 °C)  Pulse:  [77-81] 80  Resp:  [10-39] 32  SpO2:  [92 %-99 %] 96 %  BP: ()/(37-77) 50/37         Physical exam:   Gen- confused but rouses to voice  HENT- ATNC, dry mucous membranes  CV- RRR, no murmurs, requiring norepinephrine for hemodynamic support  Resp- CTAB, tachypneic  MSK- warm RLE, cool LLE with evidence of acute on chronic ischemic disease   Neuro- confused but rouses and opens eyes to voice   Psych- unable to assess 2/2 confusion         Lines/Drains/Airways        Peripherally Inserted Central Catheter Line  Duration             PICC Double Lumen 04/08/23 1236 right basilic 3 days              Drain  Duration                  Urethral Catheter 04/11/23 0000 1 day                    Significant Labs:    Lab Results   Component Value Date    WBC 21.9 (H) 04/12/2023    HGB 12.4 (L) 04/12/2023    HCT 37.6 (L) 04/12/2023    MCV 89.7 04/12/2023     (L) 04/12/2023         BMP  Lab Results   Component Value Date     (H) 04/12/2023    K 4.7 04/12/2023    CHLORIDE 111 (H) 04/12/2023    CO2 26 04/12/2023    BUN 67.3 (H) 04/12/2023    CREATININE 2.08 (H) 04/12/2023    CALCIUM 8.1 (L) 04/12/2023    AGAP 10.0 04/10/2023    ESTGFRAFRICA >60 11/10/2020    EGFRNONAA 95 02/25/2022       ABG  Recent Labs   Lab 04/11/23  2334   PH 7.52*   PO2 74*   PCO2 43   HCO3 35.1*         Mechanical Ventilation Support:  Oxygen Concentration (%): 4 (04/11/23 1050)    Significant Imaging:  I have reviewed the pertinent imaging within the past 24 hours.        Assessment/Plan:     Assessment  Hypovolemic shock likely due to aggressive diuresis  Contraction alkalosis  Hypernatremia/Hyperchloremia/Hypokalemia  likely from above  CINTHIA worsening with diuresis   HFrEF with EF of 15%  CAD s/p CABG  Large LV thrombus , large irregular right atrial thrombus   Carotid arterial disease  Proximal Afib, was on Eliquis at home - unsure compliance  Thrombocytopenia- plt of 54  Concern for lower GI bleed - Hematochezia  Third-degree AV block status post dual-chamber pacemaker   Severe PAD status post multiple interventions in the past with stents on Plavix at home  Left lower extremity critical limb ischemia  Type 2 diabetes  Hypertension  Hyperlipidemia        Plan  -improved hemodynamics yesterday, but worsened overnight and now on high-dose norepinephrine, despite some attempts at gentle fluid resuscitation yesterday  -some worsening of mental status noted, but feel that patient is protecting his airway  and aditi to stimuli this morning   -palliative Care following, discussing goals of care with his family given his overall extremely poor long-term prognosis  -continue fluid resuscitation today, dextrose infusion started for correction of hypernatremia and will continue resuscitation with lactated Ringer's  -worsened leukocytosis this morning, but remains afebrile; started on Zosyn overnight, blood cultures sent today  -no further episodes of GI bleeding  -hematology has been following for thrombocytopenia, which has improved with steroids; if family wants continued aggressive care, will require re-initiation of anticoagulation for ischemic left lower extremity   -has reportedly been evaluated by vascular surgery, but no documentation in chart to comment on his surgical candidacy (reportedly deemed not a surgical candidate), if family desires ongoing aggressive care, will require repeat vascular surgery evaluation, suspect that he may require amputation of his left lower extremity  -palliative care discussed case with family this morning, and they are actively considering hospice care/comfort care measures        DVT Prophylaxis: SCD  GI Prophylaxis: protonix          I spent 40 minutes providing critical care services to this patient.  This does not include time spent for separately billed procedures.         Ki Gonzalez MD  Pulmonary Critical Care Medicine  [facility] - 7th Floor ICU

## 2023-04-12 NOTE — NURSING
Nurses Note -- 4 Eyes      4/12/2023   3:34 AM      Skin assessed during: Daily Assessment      [] No Pressure Injuries Present    []Prevention Measures Documented      [x] Yes- Altered Skin Integrity Present or Discovered   [] LDA Added if Not in Epic (Describe Wound)   [] New Altered Skin Integrity was Present on Admit and Documented in LDA   [] Wound Image Taken    Wound Care Consulted? Yes    Attending Nurse:  Kendrick Boone RN     Second RN/Staff Member: Bobby Roberts RN

## 2023-04-12 NOTE — NURSING
Nurses Note -- 4 Eyes      4/12/2023   10:09 AM      Skin assessed during: Daily Assessment      [] No Pressure Injuries Present    [x]Prevention Measures Documented      [x] Yes- Altered Skin Integrity Present or Discovered   [] LDA Added if Not in Epic (Describe Wound)   [] New Altered Skin Integrity was Present on Admit and Documented in LDA   [] Wound Image Taken    Wound Care Consulted? Yes    Attending Nurse:  Nicky Bone RN     Second RN/Staff Member:  ROSE Valverde

## 2023-04-12 NOTE — PROGRESS NOTES
Hematology/Oncology  Progress Note    Patient Name: Darren Patel  MRN: [id number]  Admission Date: 4/7/2023  Hospital Length of Stay: 5 days  Attending Provider: Ki Gonzalez MD  Consulting Provider: Esdras Genao MD  Principal Problem: Thrombocytopenia      Subjective:     HPI:  67-year-old white male smoker with HTN, hyperlipidemia, PAD, DM and extensive cardiac history including ischemic cardiomyopathy, CAD s/p CABG, paroxysmal AFib on chronic Eliquis and third-degree AV block s/p PM.  He presented to the ER 4/7/23 following a syncopal episode at his home.  He does not remember anything up to the episode.  Echocardiogram showed LV enlargement with eccentric hypertrophy and severely decreased systolic function with an EF of 15%.  There was a large ventricular thrombus fixed and located in the apex.  There was also a large irregular right atrial thrombus which was mobile and severe left atrial enlargement. CT A/P showed anasarca with bilateral pleural effusions, ascites, mesenteric and retroperitoneal edema and body wall edema.  There was cardiomegaly with a filling defect in the left ventricular apex. CTA A/P showed extensive arthrosclerotic changes of the abdominal aorta and its branches with bilateral peripheral vascular disease.  CBC on admission showed thrombocytopenia with a platelet count of 57,000. Previous CBC from 2/23/23 showed a normal platelet count of 261,000. He was not on any new medications.  He denied any abnormal bruising or mucocutaneous bleeding.  PT and PTT were both prolonged.  Initial fibrinogen was slightly low but normal on repeat testing.  Due to the urgent need for anticoagulation, he received 1 unit of platelets for 9/23 with platelet count increasing from 40,000 to 75,000.  He was started on bivalirudin for anticoagulation by Cardiology.  I was consulted 4/10/23 for a Hematology evaluation.      He developed ischemic changes involving the left leg.  He also developed hematochezia  and the bivalirudin infusion was discontinued.  Palliative care was consulted due to the severity of his medical condition.  Unable to locate or contact any family members. He was transferred to the ICU around midnight 4/11/23 secondary to hypotension requiring IV fluid resuscitation and pressor support.  He became obtunded and unresponsive.    Today (4/12/23):  Patient remains obtunded and unresponsive.  He is off of pressors.  He has progressive ischemic changes involving the left leg.  Platelet count increased to 105,000 today on IV steroids.  No evidence of bleeding.  He is not on any anticoagulation.  Case management and palliative care able to locate a sister and brother yesterday.  His sister came to visit last night.  Family does not want to make medical decisions for him.  He remains a full code.      Medications:  Continuous Infusions:   dextrose 5 % (D5W) 75 mL/hr at 04/12/23 0757    NORepinephrine bitartrate-D5W Stopped (04/11/23 1725)     Scheduled Meds:   methylPREDNISolone sodium succinate injection  80 mg Intravenous Q12H    miconazole NITRATE 2 %   Topical (Top) BID    mupirocin   Topical (Top) BID    nitroGLYCERIN 2% TD oint  1 inch Topical (Top) Q6H    pantoprazole  40 mg Intravenous BID    piperacillin-tazobactam (ZOSYN) IVPB  4.5 g Intravenous Q8H    sodium chloride 0.9%  10 mL Intravenous Q6H    zinc oxide-cod liver oil   Topical (Top) BID     PRN Meds:sodium chloride, acetaminophen, acetaminophen, dextrose 10%, dextrose 10%, glucagon (human recombinant), hydrALAZINE, insulin aspart U-100, melatonin, ondansetron, polyethylene glycol, prochlorperazine, senna-docusate 8.6-50 mg, sodium chloride 0.9%, Flushing PICC Protocol **AND** sodium chloride 0.9% **AND** sodium chloride 0.9%        PMHx:  HTN, HLD, T2DM, CAD, ischemic CM, paroxysmal AFib, third-degree AV block with PM, severe PAD  PSHx:  Pacemaker, CABG, multiple peripheral vascular interventions  SH:  + tobacco 1-1.5 ppd.  Denies alcohol  use.  Lives alone in [place].  FH:  Unobtainable.      Review of Systems   Unable to perform ROS: Mental status change       Objective:     Vital Signs (Most Recent):  Temp: 97.9 °F (36.6 °C) (04/12/23 0400)  Pulse: 80 (04/12/23 0730)  Resp: (!) 37 (04/12/23 0730)  BP: 98/63 (04/12/23 0730)  SpO2: 96 % (04/12/23 0730)   Vital Signs (24h Range):  Temp:  [97.7 °F (36.5 °C)-98.9 °F (37.2 °C)] 97.9 °F (36.6 °C)  Pulse:  [77-81] 80  Resp:  [10-39] 37  SpO2:  [92 %-99 %] 96 %  BP: ()/(50-77) 98/63         Physical Exam  Constitutional:       Comments: Thin, obtunded white male   HENT:      Head: Normocephalic.   Eyes:      Conjunctiva/sclera: Conjunctivae normal.      Pupils: Pupils are equal, round, and reactive to light.   Cardiovascular:      Rate and Rhythm: Normal rate and regular rhythm.      Heart sounds: No murmur heard.     Comments: Well-healed sternotomy incision.  Pacemaker left chest wall.  Pulmonary:      Comments: Lungs clear anteriorly  Abdominal:      General: There is no distension.      Palpations: Abdomen is soft.      Tenderness: There is no abdominal tenderness.      Comments: Decreased bowel sounds   Genitourinary:     Comments: + Siegel catheter, clear urine  Musculoskeletal:         General: No swelling.   Skin:     General: Skin is warm and dry.      Findings: No rash.      Comments: Progressive ischemic changes left lower extremity   Neurological:      Comments: Obtunded, unresponsive       Significant Labs:   CBC:   Recent Labs   Lab 04/10/23  2141 04/11/23  0247 04/12/23  0155   WBC 13.2* 14.3* 21.9*   HGB 11.4* 12.0* 12.4*   HCT 36.5* 37.4* 37.6*   PLT 54* 55* 105*          Diagnostic Results:  No new imaging for review    Impression/Recommendations:   IMPRESSION  Acute thrombocytopenia - present on admission  Syncopal episode at home  Left ventricular apical and right atrial thrombi  Ischemic cardiomyopathy (EF 15%)   Severe PAD with ischemic changes LLE  Hypovolemic shock  Acute  kidney injury  Leukocytosis secondary to IV steroids      RECOMMENDATIONS  Patient's clinical condition remains extremely poor with a dismal prognosis.  Platelet count improved on IV steroids.  Continue IV Solu-Medrol 80 mg Q 12 hrs.  Will defer to Cardiology and critical care team to determine if anticoagulation needs to be resumed.  No additional recommendations from a hematology standpoint.      IRON DYSON MD  Hematology/Oncology

## 2023-04-12 NOTE — CONSULTS
"ConsultsPatient Name: Darren Patel   MRN: [id number]   Admission Date: 4/7/2023   Hospital Length of Stay: 5   Attending Provider: Ki Gonzalez MD   Consulting Provider: Prince Valdez M.D.  Reason for Consult: Goals of Care  Primary Care Physician: ZEYNEP Rosales     Principal Problem: Acute on chronic HFrEF (heart failure with reduced ejection fraction)     Patient information was obtained from ER records and primary team.      Final diagnoses:  [W19.XXXA] Fall  [I51.3] Left ventricular thrombus  [R23.1] Pallor  [R23.1] Pallor of extremity  [R53.1] Generalized weakness  [R60.1] Anasarca  [J90] Bilateral pleural effusion  [R74.8] Elevated liver enzymes  [A41.9] Sepsis     Assessment/Plan:     I reviewed the patient's current clinical status with the nurse. I reviewed clinical documentation, labs and imaging.     Symptom management review: pt is lethargic and confused and unable to respond.    I spoke to both the patient's sister, Kim and brother Bolivar by phone. I explained to both of them the patient's clinical situation and decline as evidence by an increased need for vasopressor support. I explained that his LLE remains pulseless and discolored which indicates a future need for amputation (for which he is not a candidate due to his current clinical situation and underlying advanced CHF). He is unable to communicate clearly, is lethargic but mouths "water." Although platelet counts are improved with steroids, his WBC count has increased. Antibiotics were instituted over night. I reviewed the concern by all providers that the patient is declining, does not appear to showing evidence of clinical improvement and is at risk of the need for intubation. We talked about the option to begin focusing purely on his comfort with hospice-like care. His sister stated that her father passed away with hospice assistance and she is in favor of this option. Initially I suggested the option of home or inpatient " hospice, but I called her back after learning of the patient's increased vasopressor needs. I explained to her that he likely will not survive withdrawal of pressor support, but if he were to survive, transfer to inpatient hospice remains an options to achieve his comfort until end of life. She was in favor of comfort care measures and discontinuation of life-supportive measures (including vasopressors). She also was in favor of DNR code status. His brother, Bolivar, stated that he was in favor of whatever decisions made by his sister, Kim.    I spoke with critical care. We will proceed with comfort-care measures and withdrawal of vasopressors. It patient should remain stable, will plan for possible transfer to inpatient hospice.    History of Present Illness:     66 y/o WM h/o severe PAD, DM, ICM with EF 15%, CAD, FAF, currently admitted after found unresponsive in his home. He is found by ECHO to have a large left ventricular thrombus and large Rt atrial thrombus. We were consulted to review goals of care.      Active Ambulatory Problems     Diagnosis Date Noted    Complete heart block 11/06/2020    Essential hypertension 11/07/2020    Coronary artery disease involving native coronary artery of native heart without angina pectoris 11/09/2020    Type 2 diabetes mellitus without complication, without long-term current use of insulin 11/10/2020    Cardiac pacemaker in situ 02/22/2021    Ischemic cardiomyopathy 05/03/2022    Paroxysmal atrial fibrillation 05/03/2022    Mixed hyperlipidemia 05/03/2022    Tobacco abuse 05/03/2022    Heart failure with reduced ejection fraction 04/20/2021    Peripheral arterial disease 02/23/2023    Obesity 02/23/2023     Resolved Ambulatory Problems     Diagnosis Date Noted    No Resolved Ambulatory Problems     Past Medical History:   Diagnosis Date    Diabetes mellitus     High cholesterol     Hypertension         Past Surgical History:   Procedure Laterality Date    A-V CARDIAC  PACEMAKER INSERTION Left 11/9/2020    Procedure: INSERTION, CARDIAC PACEMAKER, DUAL CHAMBER;  Surgeon: Nolan Hernandez MD;  Location: [facility] CATH LAB;  Service: Cardiology;  Laterality: Left;    CORONARY ANGIOGRAPHY N/A 11/9/2020    Procedure: ANGIOGRAM, CORONARY ARTERY - right radial;  Surgeon: Nolan Hernandez MD;  Location: [facility] CATH LAB;  Service: Cardiology;  Laterality: N/A;        Review of patient's allergies indicates:  No Known Allergies       Current Facility-Administered Medications:     0.9%  NaCl infusion (for blood administration), , Intravenous, Q24H PRN, Nahid Sen MD    acetaminophen tablet 1,000 mg, 1,000 mg, Oral, Q6H PRN, Nate Robledo MD    acetaminophen tablet 650 mg, 650 mg, Oral, Q4H PRN, Nate Robledo MD    dextrose 10% bolus 125 mL 125 mL, 12.5 g, Intravenous, PRN, Nate Robledo MD    dextrose 10% bolus 250 mL 250 mL, 25 g, Intravenous, PRN, Nate Robledo MD    dextrose 5 % (D5W) infusion, , Intravenous, Continuous, Ki Gonzalez MD, Last Rate: 75 mL/hr at 04/12/23 0913, Rate Change at 04/12/23 0913    glucagon (human recombinant) injection 1 mg, 1 mg, Intramuscular, PRN, Nate Robledo MD    glycopyrrolate (PF) injection 400 mcg, 400 mcg, Intravenous, Q6H PRN, Prince Valdez MD    hydrALAZINE injection 10 mg, 10 mg, Intravenous, Q4H PRN, Nate Robledo MD    insulin aspart U-100 injection 0-5 Units, 0-5 Units, Subcutaneous, Q6H PRN, Nate Robledo MD, 4 Units at 04/12/23 0811    LORazepam (ATIVAN) injection 1 mg, 1 mg, Intravenous, Q2H PRN, Prince Valdez MD    melatonin tablet 6 mg, 6 mg, Oral, Nightly PRN, Nate Robledo MD    methylPREDNISolone sodium succinate injection 80 mg, 80 mg, Intravenous, Q12H, Esdras Genao MD, 80 mg at 04/12/23 0758    miconazole NITRATE 2 % top powder, , Topical (Top), BID, Nate Robledo MD, Given at 04/12/23 0800    morphine injection 2 mg, 2 mg, Intravenous, Q1H PRN, Prince Valdez MD    mupirocin 2 % ointment, , Topical (Top), BID, Nate Robledo MD,  Given at 04/12/23 0800    nitroGLYCERIN 2% TD oint ointment 1 inch, 1 inch, Topical (Top), Q6H, ABDIFATAH CastP, 1 inch at 04/12/23 0533    NORepinephrine 8 mg in dextrose 5% 250 mL infusion, 0-3 mcg/kg/min, Intravenous, Continuous, Darren Womack DO, Last Rate: 31.9 mL/hr at 04/12/23 0915, 0.2 mcg/kg/min at 04/12/23 0915    ondansetron injection 4 mg, 4 mg, Intravenous, Q4H PRN, Nate Robledo MD    pantoprazole injection 40 mg, 40 mg, Intravenous, BID, Ki Gonzalez MD, 40 mg at 04/12/23 0800    piperacillin-tazobactam (ZOSYN) 4.5 g in dextrose 5 % in water (D5W) 5 % 100 mL IVPB (MB+), 4.5 g, Intravenous, Q8H, Gladis Tipton MD, Last Rate: 25 mL/hr at 04/12/23 0959, 4.5 g at 04/12/23 0959    polyethylene glycol packet 17 g, 17 g, Oral, BID PRN, Nate Robledo MD    prochlorperazine injection Soln 5 mg, 5 mg, Intravenous, Q6H PRN, Nate Robledo MD    senna-docusate 8.6-50 mg per tablet 2 tablet, 2 tablet, Oral, BID PRN, Nate Robledo MD    sodium chloride 0.9% flush 10 mL, 10 mL, Intravenous, PRN, Nate Rolbedo MD    Flushing PICC Protocol, , , Until Discontinued **AND** sodium chloride 0.9% flush 10 mL, 10 mL, Intravenous, Q6H, 10 mL at 04/12/23 0600 **AND** sodium chloride 0.9% flush 10 mL, 10 mL, Intravenous, PRN, Nahid Sen MD    zinc oxide-cod liver oil 40 % paste, , Topical (Top), BID, Nate Robledo MD, Given at 04/12/23 0800     sodium chloride, acetaminophen, acetaminophen, dextrose 10%, dextrose 10%, glucagon (human recombinant), glycopyrrolate (PF), hydrALAZINE, insulin aspart U-100, lorazepam, melatonin, morphine, ondansetron, polyethylene glycol, prochlorperazine, senna-docusate 8.6-50 mg, sodium chloride 0.9%, Flushing PICC Protocol **AND** sodium chloride 0.9% **AND** sodium chloride 0.9%     Family History   Family history unknown: Yes        Review of Systems   Unable to perform ROS: Mental status change          Objective:   BP (!) 135/42   Pulse 79   Temp 99 °F (37.2 °C) (Oral)    "Resp (!) 33   Ht 6' 0.01" (1.829 m)   Wt 85 kg (187 lb 6.3 oz)   SpO2 96%   BMI 25.41 kg/m²      Physical Exam  Constitutional:       General: He is not in acute distress.     Appearance: He is ill-appearing and toxic-appearing.   HENT:      Head: Normocephalic.      Right Ear: External ear normal.      Left Ear: External ear normal.      Nose: Nose normal.      Mouth/Throat:      Mouth: Mucous membranes are moist.      Pharynx: Oropharynx is clear.   Eyes:      Extraocular Movements: Extraocular movements intact.      Conjunctiva/sclera: Conjunctivae normal.      Pupils: Pupils are equal, round, and reactive to light.   Cardiovascular:      Rate and Rhythm: Normal rate and regular rhythm.      Pulses: Normal pulses.      Heart sounds: Normal heart sounds.   Pulmonary:      Effort: Pulmonary effort is normal.      Breath sounds: Normal breath sounds.   Abdominal:      General: Abdomen is flat. Bowel sounds are normal. There is no distension.      Palpations: Abdomen is soft.      Tenderness: There is no abdominal tenderness.   Musculoskeletal:      Right lower leg: No edema.      Left lower leg: No edema.   Skin:     General: Skin is warm.      Comments: Left great toe is black some general bluish discoloration to the foot noted.   Neurological:      General: No focal deficit present.      Mental Status: He is disoriented.          Review of Symptoms  Review of Symptoms      Symptom Assessment (ESAS 0-10 Scale)  Unable to complete assessment due to Mental status change     CAM / Delirium:  Positive  Constipation:  Negative  Diarrhea:  Negative      Bowel Management Plan (BMP):  Yes      Pain Assessment in Advanced Demential Scale (PAINAD)   Breathing - Independent of vocalization:  0  Negative vocalization:  0  Facial expression:  0  Body language:  0  Consolability:  0  Total:  0    Modified Chandler Scale:  0    Performance Status:  20    Living Arrangements:  Lives alone and Lives in " home    Psychosocial/Cultural:   See Palliative Psychosocial Note: No  **Primary  to Follow**  Palliative Care  Consult: No    Spiritual:  F - Ines and Belief:  Unable to obtain    Advance Care Planning   Advance Directives:   Living Will: No    [place]: No    Do Not Resuscitate Status: Yes    Medical Power of : No    Agent's Name:  SisterKim   Agent's Contact Number:  [phone]    Decision Making:  Patient unable to communicate due to disease severity/cognitive impairment and Family answered questions          > 50% of 35 min of encounter was spent in chart review, face to face discussion of goals of care, symptom assessment, coordination of care and emotional support.     Additional 35 min spent in voluntary ACP discussion with family.    Prince Valdez M.D.  Palliative Medicine  [facility]

## 2023-04-12 NOTE — PROGRESS NOTES
NEPHROLOGY PROGRESS NOTE       Patient Name: Darren Patel  ROM 1955    Date: 2023  Time: 12:07 PM      Reason for consult: Contraction alkalosis, hypernatremia       HPI: 67-year-old male with extensive cardiac history including CAD s/p CABG, pAF, PCM placement for AV block, HFrEF, also with DM2 and HTN was brought the ER as he was found unresponsive by his neighbor. During this hospitalization, there was concern for volume overload. He was evaluated by medicine and cardiology teams and was initiated on IV diuresis. Later in his hospitalization, the pt developed hypotension and worsening mental status. He was thought to be over-diuresed. He was transferred to ICU for further management, where he was started on low-dose IV vasopressors and was initiated on IVFs. Pt was found to have alkalosis and hypernatremia, hence nephrology service was consulted. Pt also found to have CINTHIA.    Interval History: Pt seen and examined at bedside in the ICU setting this AM. No family present. Pt does not awaken to answer questions. Per review of current notes, the pt is being transitioned to comfort care.     Review of Systems:  Unable to obtain 2/2 pt condition.        Current Facility-Administered Medications:     0.9%  NaCl infusion (for blood administration), , Intravenous, Q24H PRN, Nahid Sen MD    acetaminophen tablet 1,000 mg, 1,000 mg, Oral, Q6H PRN, Nate Robledo MD    acetaminophen tablet 650 mg, 650 mg, Oral, Q4H PRN, Nate Robledo MD    dextrose 10% bolus 125 mL 125 mL, 12.5 g, Intravenous, PRN, Nate Robledo MD    dextrose 10% bolus 250 mL 250 mL, 25 g, Intravenous, PRN, Nate Robledo MD    glucagon (human recombinant) injection 1 mg, 1 mg, Intramuscular, PRN, Nate Robledo MD    glycopyrrolate injection 400 mcg, 400 mcg, Intravenous, Q6H PRN, Prince Valdez MD    hydrALAZINE injection 10 mg, 10 mg, Intravenous, Q4H PRNNate MD    insulin aspart U-100 injection 0-5 Units, 0-5 Units,  Subcutaneous, Q6H PRN, Nate Robledo MD, 4 Units at 04/12/23 0811    LORazepam (ATIVAN) injection 1 mg, 1 mg, Intravenous, Q2H PRN, Prince Valdez MD    melatonin tablet 6 mg, 6 mg, Oral, Nightly PRN, Nate Robledo MD    methylPREDNISolone sodium succinate injection 80 mg, 80 mg, Intravenous, Q12H, Esdras Genao MD, 80 mg at 04/12/23 0758    miconazole NITRATE 2 % top powder, , Topical (Top), BID, Nate Robledo MD, Given at 04/12/23 0800    morphine injection 2 mg, 2 mg, Intravenous, Q1H PRN, Prince Valdez MD    mupirocin 2 % ointment, , Topical (Top), BID, Nate Robledo MD, Given at 04/12/23 0800    nitroGLYCERIN 2% TD oint ointment 1 inch, 1 inch, Topical (Top), Q6H, Monica Hendrickson, FNP, 1 inch at 04/12/23 0533    ondansetron injection 4 mg, 4 mg, Intravenous, Q4H PRN, Nate Robledo MD    pantoprazole injection 40 mg, 40 mg, Intravenous, BID, Ki Gonzalez MD, 40 mg at 04/12/23 0800    piperacillin-tazobactam (ZOSYN) 4.5 g in dextrose 5 % in water (D5W) 5 % 100 mL IVPB (MB+), 4.5 g, Intravenous, Q8H, Gladis Tipton MD, Last Rate: 25 mL/hr at 04/12/23 0959, 4.5 g at 04/12/23 0959    polyethylene glycol packet 17 g, 17 g, Oral, BID PRN, Nate Robledo MD    prochlorperazine injection Soln 5 mg, 5 mg, Intravenous, Q6H PRN, Nate Robledo MD    senna-docusate 8.6-50 mg per tablet 2 tablet, 2 tablet, Oral, BID PRN, Nate Robledo MD    sodium chloride 0.9% flush 10 mL, 10 mL, Intravenous, PRN, Nate Robledo MD    Flushing PICC Protocol, , , Until Discontinued **AND** sodium chloride 0.9% flush 10 mL, 10 mL, Intravenous, Q6H, 10 mL at 04/12/23 0600 **AND** sodium chloride 0.9% flush 10 mL, 10 mL, Intravenous, PRN, Nahid Sen MD    zinc oxide-cod liver oil 40 % paste, , Topical (Top), BID, Nate Robledo MD, Given at 04/12/23 0800    Vital Signs (24 h):  Temp:  [97.7 °F (36.5 °C)-99.1 °F (37.3 °C)] 99.1 °F (37.3 °C)  Pulse:  [77-81] 80  Resp:  [10-39] 31  SpO2:  [92 %-99 %] 95 %  BP: ()/(37-77) 98/59    I/O last 3 completed shifts:  In: 2428 [I.V.:928; IV Piggyback:1500]  Out: 1325 [Urine:1325]  No intake/output data recorded.        Physical Exam:  General: elderly frail appearing male, unresponsive to stimuli.  HEENT: on [place]ym[place], dry MM.  CVS: irregular rhythm    RS: breathing is mildly labored, but does not appear to be in distress.   Abdominal: Soft, NT/ND.  Extremities: No edema b/l LE, mottled LLE.  Neuro: unable to assess.  Dialysis Access: None    Results:    Lab Results   Component Value Date     (H) 04/12/2023    K 4.7 04/12/2023     11/10/2020    CO2 26 04/12/2023    BUN 67.3 (H) 04/12/2023    CREATININE 2.08 (H) 04/12/2023     (H) 11/10/2020    CALCIUM 8.1 (L) 04/12/2023    PHOS 2.6 04/11/2023    WBC 21.9 (H) 04/12/2023    HGB 12.4 (L) 04/12/2023    HCT 37.6 (L) 04/12/2023     (L) 04/12/2023       Assessment and Plan:      Hypernatremia.  Contraction alkalosis.  Pt with hypernatremia and contraction alkalosis in the setting of diuretic use and decreased access to free water. Serum sodium is mildly improved to 153 this AM. Pt now comfort care.  - May continue with D5W if necessary.     CINTHIA.  Pt with likely hemodynamically mediated CINTHIA in the setting of hypotension 2/2 over-diuresis. Pt with normal Scr in the past as per review of labs on [facility]. Scr today has increased to 2.08.  - Continue to maintain hemodynamics.  - Avoid potential nephrotoxins.  - Monitor Scr.    Thank you for your consult. Please feel free to reach me with any questions.  Pt made comfort care. Will sign-off. Please re-consult PRN.    Jus Calderón DO  Interventional Nephrology  Cell: [phone]

## 2023-04-12 NOTE — CONSULTS
NEPHROLOGY INITIAL CONSULTATION NOTE       Patient Name: Darren Patel  ROM 1955    Date: 2023  Time: 7:34 PM      Consult requested by: Ki Gonzalez MD     Reason for consult: Contraction alkalosis, hypernatremia       HPI: 67-year-old male with extensive cardiac history including CAD s/p CABG, pAF, PCM placement for AV block, HFrEF, also with DM2 and HTN was brought the ER as he was found unresponsive by his neighbor. During this hospitalization, there was concern for volume overload. He was evaluated by medicine and cardiology teams and was initiated on IV diuresis. Later in his hospitalization, the pt developed hypotension and worsening mental status. He was thought to be over-diuresed. He was transferred to ICU for further management, where he was started on low-dose IV vasopressors and was initiated on IVFs. Today, the pt underwent two ABGs given his altered mental status. His pH was found to be in the 7.6 range initially, which improved to normal and had an elevated HCO3  which was down-trending. However, the pt was found to have a respiratory acidosis with a pCO2 of 56. Serum sodium was noted to be elevated 156 earlier today. Nephrology service was consulted for contraction alkalosis and hypernatremia. Pt also found to have an elevated Scr of 1.26, and therefore also has a mild CINTHIA.    Pt seen and examined at bedside in the ICU setting this PM. No family present. Pt does not awaken to answer questions. Upon lab review, it appears the pt had a normal Scr in the past, and that his mild elevation in Scr recently may be related to his episodes of hypotension. Serum sodium appears to be with a subtle down-trend.     Review of Systems:  Unable to obtain 2/2 pt condition.    Past Medical History:   Diagnosis Date    Diabetes mellitus     High cholesterol     Hypertension       Past Surgical History:   Procedure Laterality Date    A-V CARDIAC PACEMAKER INSERTION Left 2020     Procedure: INSERTION, CARDIAC PACEMAKER, DUAL CHAMBER;  Surgeon: Nolan Hernandez MD;  Location: [facility] CATH LAB;  Service: Cardiology;  Laterality: Left;    CORONARY ANGIOGRAPHY N/A 11/9/2020    Procedure: ANGIOGRAM, CORONARY ARTERY - right radial;  Surgeon: Nolan Hernandez MD;  Location: [facility] CATH LAB;  Service: Cardiology;  Laterality: N/A;      Review of patient's allergies indicates:  No Known Allergies   Social History     Tobacco Use    Smoking status: Some Days     Packs/day: 1.50     Types: Cigarettes    Smokeless tobacco: Never   Substance Use Topics    Alcohol use: Never     Comment: occasionally    Drug use: Never      Family History   Family history unknown: Yes         Current Facility-Administered Medications:     0.9%  NaCl infusion (for blood administration), , Intravenous, Q24H PRN, Nahid Sen MD    acetaminophen tablet 1,000 mg, 1,000 mg, Oral, Q6H PRN, Nate Robledo MD    acetaminophen tablet 650 mg, 650 mg, Oral, Q4H PRN, Nate Robledo MD    dextrose 10% bolus 125 mL 125 mL, 12.5 g, Intravenous, PRN, Nate Robledo MD    dextrose 10% bolus 250 mL 250 mL, 25 g, Intravenous, PRN, Nate Robledo MD    dextrose 5 % and 0.45 % NaCl infusion, , Intravenous, Continuous, Jus Calderón DO, Last Rate: 100 mL/hr at 04/11/23 1811, New Bag at 04/11/23 1811    glucagon (human recombinant) injection 1 mg, 1 mg, Intramuscular, PRN, Nate Robledo MD    hydrALAZINE injection 10 mg, 10 mg, Intravenous, Q4H PRN, Nate Robledo MD    insulin aspart U-100 injection 0-5 Units, 0-5 Units, Subcutaneous, Q6H PRN, Nate Robledo MD    melatonin tablet 6 mg, 6 mg, Oral, Nightly PRN, Nate Robldeo MD    methylPREDNISolone sodium succinate injection 80 mg, 80 mg, Intravenous, Q12H, Esdras Genao MD, 80 mg at 04/11/23 0827    miconazole NITRATE 2 % top powder, , Topical (Top), BID, Nate Robledo MD, Given at 04/11/23 1029    mupirocin 2 % ointment, , Topical (Top), BID, Nate Robledo MD, Given at 04/11/23 0827    nitroGLYCERIN 2% TD oint  ointment 1 inch, 1 inch, Topical (Top), Q6H, Monica Hendrickson, FNP, 1 inch at 04/11/23 1700    NORepinephrine 8 mg in dextrose 5% 250 mL infusion, 0-3 mcg/kg/min, Intravenous, Continuous, Darren Womack DO, Stopped at 04/11/23 1725    ondansetron injection 4 mg, 4 mg, Intravenous, Q4H PRN, Nate Robledo MD    pantoprazole injection 40 mg, 40 mg, Intravenous, BID, Ki Gonzalez MD    piperacillin-tazobactam (ZOSYN) 4.5 g in dextrose 5 % in water (D5W) 5 % 100 mL IVPB (MB+), 4.5 g, Intravenous, Q8H, Gladis Tipton MD, Last Rate: 25 mL/hr at 04/11/23 1725, 4.5 g at 04/11/23 1725    polyethylene glycol packet 17 g, 17 g, Oral, BID PRN, Nate Robledo MD    prochlorperazine injection Soln 5 mg, 5 mg, Intravenous, Q6H PRN, Nate Robledo MD    senna-docusate 8.6-50 mg per tablet 2 tablet, 2 tablet, Oral, BID PRN, Nate Robledo MD    sodium chloride 0.9% flush 10 mL, 10 mL, Intravenous, PRN, Nate Robledo MD    Flushing PICC Protocol, , , Until Discontinued **AND** sodium chloride 0.9% flush 10 mL, 10 mL, Intravenous, Q6H, 10 mL at 04/11/23 1800 **AND** sodium chloride 0.9% flush 10 mL, 10 mL, Intravenous, PRN, Nahid Sen MD    zinc oxide-cod liver oil 40 % paste, , Topical (Top), BID, Nate Robledo MD, Given at 04/11/23 0900    Vital Signs (24 h):  Temp:  [96.9 °F (36.1 °C)-98.9 °F (37.2 °C)] 98.9 °F (37.2 °C)  Pulse:  [] 80  Resp:  [12-30] 16  SpO2:  [90 %-100 %] 97 %  BP: ()/(41-76) 117/72   I/O last 3 completed shifts:  In: 2428 [I.V.:928; IV Piggyback:1500]  Out: 4365 [Urine:4365]  No intake/output data recorded.        Physical Exam:  General: elderly frail appearing male, unresponsive to stimuli.  HEENT: on Oxymask, dry MM.  CVS: irregular rhythm    RS: breathing is mildly labored, but does not appear to be in distress.   Abdominal: Soft, NT/ND.  Extremities: No edema b/l LE  Neuro: unable to assess.  Dialysis Access: None    Results:    Lab Results   Component Value Date     (H) 04/11/2023     K 4.2 04/11/2023     11/10/2020    CO2 29 04/11/2023    BUN 50.7 (H) 04/11/2023    CREATININE 1.24 (H) 04/11/2023     (H) 11/10/2020    CALCIUM 8.3 (L) 04/11/2023    PHOS 2.6 04/11/2023    WBC 14.3 (H) 04/11/2023    HGB 12.0 (L) 04/11/2023    HCT 37.4 (L) 04/11/2023    PLT 55 (L) 04/11/2023       Assessment and Plan:      Hypernatremia.  Contraction alkalosis.  Pt with hypernatremia and contraction alkalosis in the setting of diuretic use and decreased access to free water. Serum sodium noted to be elevated to 156 this AM, and is mildly improved to 155 this PM. Serum CO2 was elevated to 40 on 4/10/23 and is now normal at 29 this PM. Pt was initiated on IVFs by ICU team upon transfer to the unit.  - Recommend switching IVFs to hypotonic fluids (D5W + 1/2NS @ 100 cc/hr). If BP stabilizes and intravascular volume repletion is no longer required, changing fluids to D5W alone will allow for further improvement in hypernatremia.  - Correct respiratory acidosis if possible.  - Monitor labs, particularly serum sodium and serum CO2.  - Avoid over-correction of serum sodium by more than 8 mEq in 24 hours. Goal serum sodium by tomorrow AM is ~148.    CINTHIA.  Pt with likely hemodynamically mediated CINTHIA in the setting of hypotension 2/2 over-diuresis. Pt with normal Scr in the past as per review of labs on [facility]. Scr today is mildly elevated at 1.24.  - Continue to maintain hemodynamics.  - Avoid potential nephrotoxins.  - Monitor Scr.    Thank you for your consult. Please feel free to reach me with any questions.  Plan for follow-up tomorrow, and likely sign-off.    Jus Calderón,   Interventional Nephrology  Cell: [phone]

## 2023-04-12 NOTE — DISCHARGE SUMMARY
U Internal Medicine Discharge Summary    Admitting Physician: Nate Robledo MD  Attending Physician: Ki Gonzalez MD  Date of Admit: 2023  Date of Discharge: 2023    Condition:   DISPOSITION:  in Medical Facility          Discharge Diagnoses     Patient Active Problem List   Diagnosis    Complete heart block    Essential hypertension    Coronary artery disease involving native coronary artery of native heart without angina pectoris    Type 2 diabetes mellitus without complication, without long-term current use of insulin    Cardiac pacemaker in situ    Ischemic cardiomyopathy    Paroxysmal atrial fibrillation    Mixed hyperlipidemia    Tobacco abuse    Heart failure with reduced ejection fraction    Peripheral arterial disease    Obesity    Acute on chronic HFrEF (heart failure with reduced ejection fraction)    LV (left ventricular) mural thrombus    Hepatic congestion    Thrombocytopenia    Palliative care status    Anasarca    Alkalosis    Dehydration    Hypernatremia    CINTHIA (acute kidney injury)       Principal Problem:  Acute on chronic HFrEF (heart failure with reduced ejection fraction)    Consultants and Procedures     Consultants:  IP CONSULT TO CARDIOLOGY  WOUND CARE CONSULT  IP CONSULT TO GENERAL SURGERY  IP CONSULT TO GENERAL SURGERY  IP CONSULT TO VASCULAR SURGERY  IP CONSULT TO HEMATOLOGY  IP CONSULT TO PALLIATIVE CARE  IP CONSULT TO SOCIAL WORK/CASE MANAGEMENT  IP CONSULT TO REGISTERED DIETITIAN/NUTRITIONIST  WOUND CARE CONSULT  IP CONSULT TO GI  IP CONSULT TO GI  IP CONSULT TO NEPHROLOGY  IP CONSULT TO SPIRITUAL CARE    Procedures:   * No surgery found *     Brief Admission History      67-year-old male with past medical history of ischemic cardiomyopathy/HFrEF,CAD s/p CABG, paroxysmal AFib on Eliquis, third-degree AV block  s/p dual-chamber pacemaker, PAD/multiple interventions on Plavix, T2DM, HTN, HLD was initially admitted to hospital service  after being found by  his neighbor laying on the floor naked.CT head showed no acute intracranial abnormalities. CT abdomen and pelvis show evidence of anasarca with bilateral small pleural effusion, small ascites and mesenteric edema, cardiomegaly and filling defect in the LV apex highly suspicious for thrombus. Echo done during the hospital stay showed LVEF 15%,with  large fixed LV apical thrombus and Right atrial thrombus. Unsure if patient was complaint with eliquis at home but during his stay on the floors he was started on angiomax (given plt in 50s) by cardiology and has been treated for decompensated heart failure with 80 lasix BID and GDMT for 3 days with a net output of 7290 L over last 48 hours.  Patient's BP has been dropping and had gotten 900 ml of fluids with minimal improvement in  SBP ranging in  70s, RRT was called and was decided to upgraded to ICU for further care and management.   Per chart review, on arrival patient had thrombocytopenia and hemeonc was consulted. It was noted that patient had hematochezia with suspicion for GI bleed and angiomax was discontinued. Palliative is also on board with the case regarding patient's poor prognosis but unsuccessful attempts to reach family and patient on arrival is alert but is discontented to place and time but person. Palliative working with ethics committee regarding this patient's case at this time as well.     Hospital Course with Pertinent Findings     Admission history as above.  Patient was admitted to the ICU on 04/10/2023 for significant hypotension.  Patient was placed multiple pressors due to worsening hemodynamic status.  There is also evidence that the patient's left lower extremity was showing signs of critical limb ischemia with evidence of extremity mottling and poor perfusion.  Palliative Care was consulted the family was contacted and they were in favor of comfort care measures and discontinuation of life support measures including vasopressors.  Family  decided to make patient DNR code status.       Received a page from the nurse that Mr. Darren Patel's telemetry monitor was showing PEA. On arrival at bedside, he was unresponsive to verbal, tactile or painful stimuli. He  was not moving any of his extremities spontaneously. He had non-palpable bilateral  Carotid, Radial, Brachial, Femoral, DP, and PT pulses. On auscultation of his precordium and anterior thorax, he did not have any audible heart sounds or breath sounds. He had absent pupillary light reflexes as well as absent corneal reflexes bilaterally. He was pronounced  at 1236 on 2023. Cause of death was cardiopulmonary arrest from acute on chronic HFrEF.      Time of Death: 1245  Date of Death: 2023     Attending physician notified.         TIME SPENT ON DISCHARGE: 60 minutes      Fernando Reich MD  Internal Medicine - PGY-1

## 2023-04-13 LAB — BEAKER SEE SCANNED REPORT: NORMAL

## 2023-04-13 NOTE — PHYSICIAN QUERY
PT Name: Darren Patel  MR #: [id number]    DOCUMENTATION CLARIFICATION     CDS/: Alicia Hawthorne RN, CCDS              Contact information: [email or web address]    This form is a permanent document in the medical record.     Query Date: April 13, 2023    By submitting this query, we are merely seeking further clarification of documentation.. Please utilize your independent clinical judgment when addressing the question(s) below.    The medical record contains the following:   Indicators  Supporting Clinical Findings Location in Medical Record   X Energy Intake NPO    Calories: not meeting estimated needs  Protein: not meeting estimated needs    Tube feed recommendations: Diabetisource AC at goal rate of 85mL/hr.  4/8 RD note    Weight Loss     X Fat Loss Body Fat:mild depletion  Area of Body Fat Loss: orbital region  and upper arm region - triceps / biceps 4/8 RD note   X Muscle Loss Muscle Mass Loss: mild depletion  Area of Muscle Mass Loss: temple region - temporalis muscle, clavicle bone region - pectoralis major, deltoid, trapezius muscles, and clavicle and acromion bone region - deltoid muscle   4/8 RD note   X Edema/Fluid Accumulation +2 pedal edema, jvd up to earlobes 4/7 H/P    Reduced  Strength (by dynamometer)     X Weight, BMI, Usual Body Weight BMI (Calculated): 27.8 4/8 RD note   X Delayed Wound Healing Patient has ulcerated lesion to the scrotum on the left and a couple lesions just proximal to the glans penis.   4/7 ed note   X Registered Dietician Diagnosis Malnutrition in the context of acute illness or injury  Degree of Malnutrition:  (moderate) malnutrition   4/8 RD note   X Acute or Chronic Illness Unwitnessed fall/syncope  Acute on chronic systolic and diastolic heart failure  LV apical thrombus (while on Eliquis)  NSTEMI unknown type  Congestive hepatopathy versus acute cholecystitis  Acute kidney injury  Acute thrombocytopenia -etiology possibly ?cardiac/LYNCH cirrhosis or  infection/sepsis  Hypernatremia    4/7 h/p   X Social or Environmental Circumstances   Patient states that he fell at some point yesterday and he is been on the floor overnight since.  He complains of feeling very thirsty and generalized weakness.   4/7 ed note    Treatment     X Other 67-year-old male appears weak in general  Patient has very dry mucous membranes.  4/7 ed note     Academy of Nutrition and Dietetics (Academy) and the American Society for Parenteral and Enteral Nutrition (A.S.P.E.N.) Clinical Characteristics to support Malnutrition   Malnutrition in the Context of Acute Illness or Injury Malnutrition in the Context of Chronic Illness or Injury Malnutrition in the Context of Social or Environmental Circumstances   Malnutrition Level Moderate Severe Moderate Severe   Moderate   Severe   Energy Intake <75%                   >7 days <50%                 >5 days <75%           >1 month <75%                      >1 month   <75% for >3 months   <50% for >1 month   Weight Loss   1-2% in 1 week >2% in 1 week 5% in 1 month >5% in 1 month 5% in 1 month >5% in 1 month    5% in 1 month >5% in 1 month 7.5% in 3 months >7.5% in 3 months 7.5% in 3 months >7.5% in 3 months    7.5% in 3 months >7.5% in 3 months 10% in 6 months >10% in 6 months 10% in 6 months >10% in 6 months        20% in 1 year                    >20% in 1 year                                                                  20% in 1 year                            >20% in 1 year                                                  Subcutaneous Fat Loss Mild  Moderate  Mild  Severe    Mild   Severe   Muscle Loss Mild  Moderate  Mild  Severe    Mild   Severe   Edema/Fluid Accumulation Mild Moderate to severe  Mild  Severe   Mild   Severe   Reduced  Strength         (based on standards supplied by  of dynamometer) N/A Measurably reduced N/A Measurably reduced N/A Measurably reduced     Criteria for mild malnutrition is defined as 1  characteristic outlined above within the established moderate or severe parameters.  A minimum of 2 out of the 6 characteristics noted above are recommended for a diagnosis of moderate or severe malnutrition.  Chronic illness/injury is a disease/condition lasting 3 months or longer.    The noted clinical guidelines are only system guidelines and do not replace the providers clinical judgment.    Provider, please specify diagnosis or diagnoses associated with above clinical findings.    [  ] Mild Malnutrition - 1 characteristic outlined above within the established moderate or severe parameters   [  ] Moderate Malnutrition - a minimum of 2 of the 6 moderate malnutrition characteristics noted above    [ x ] Other Nutritional Diagnosis (please specify): _unable to deremine______   Please document in your progress notes daily for the duration of treatment until resolved and  include in your discharge summary.      References:    FORREST Gonzalez, & ROMAIN Garrison. (2022, April). Assessment and management of anorexia and cachexia in palliative care. Retrieved May 23, 2022, from https://www.[employer]/contents/assessment-and-management-of-anorexia-and-cachexia-in-palliative-care?utgmnJyd=8099&source=see_link     CHEKO Herrera, PhD, RD, Rafael VELA P., PhD, RN, TAO Pepper MD, PhD, Jeannie STOCK A., MS, RD, [facility], JC Diaz, MS, RD, The Academy Malnutrition Work Group, The A.S.P.E.N. Board of Directors. (2012). Consensus Statement: Academy of Nutrition and Dietetics and American Society for Parenteral and Enteral Nutrition: Characteristics Recommended for the Identification and Documentation of Adult Malnutrition (Undernutrition). Journal of Parenteral and Enteral Nutrition, 36(3), 275-283. doi:10.1177/6698525859606714     Form No. [id number]

## 2023-04-13 NOTE — PHYSICIAN QUERY
PT Name: Darren Patel  MR #: [id number]     DOCUMENTATION CLARIFICATION      CDS/: Alicia Hawthorne RN, CCDS             Contact information:  [email or web address]    This form is a permanent document in the medical record.     Query Date: April 13, 2023    Dear Provider,  By submitting this query, we are merely seeking further clarification of documentation.  Please utilize your independent clinical judgment when addressing the question(s) below.     The Medical Record contains the following:    Supporting Clinical Findings Location in Medical Record   Fall,  brought in by ambulance.   Patient states that he fell at some point yesterday and he is been on the floor overnight since  /70, HR 82, RR-19, T 98.2, Sats 97%  Wbc 12.3   4/7 ed note   He was given 1 L of normal saline, vancomycin, Zosyn, aspirin 325mg, cardiology consulted and referred to hospital medicine service for further evaluation and management.  Unwitnessed Fall/Syncope  Acute on chronic systolic and diastolic HF  NSTEMI, CINTHIA  LV apical Thrombus  CINTHIA, Hypernatremia  Acute thrombocytopenia, -etiology possibly ?cardiac/LYNCH cirrhosis or infection/sepsis    Continue IV vanco/ Zosyn to cover possible sepsis but so far all cultures negative.  We will DC antibiotics 4/7 H/P                4/7 H/P, 4/8-4/10 prog notes      4/10 prog notes     Sepsis   4/11, 4/12 Pal Care notes   Wbc 12.3-->8.7-->10.5-->11.1-->13.2-->14.3-->21.9    Blood culture- NGTD    Lactate- 2.1-->2.2-->2.0    Leukocytosis worsened to 21 this morning, but remains afebrile.  Started on Zosyn overnight for coverage of possible infectious process 4/7-4/12 lab    4/12 lab    4/1- 4/11 lab    4/12 prog note     Please clarify if the  Sepsis  diagnosis has been:    [  ] Ruled In     [  ] Ruled Out   [ x ] Other/Clarification of findings (please specify): __unable to determine_____________       Please document in your progress notes daily for the duration of treatment, until  resolved, and include in your discharge summary.    Form No. [id number]

## 2023-04-14 LAB
LEFT CCA DIST SYS: 0 CM/S
LEFT CCA PROX SYS: 0 CM/S
LEFT ECA SYS: 0 CM/S
LEFT ICA DIST SYS: 0 CM/S
LEFT ICA MID SYS: 0 CM/S
LEFT ICA PROX SYS: 0 CM/S
LEFT VERTEBRAL SYS: 106 CM/S
OHS CV CAROTID RIGHT ICA EDV HIGHEST: 17
OHS CV CAROTID ULTRASOUND RIGHT ICA/CCA RATIO: 0.53
OHS CV PV CAROTID LEFT HIGHEST CCA: 0
OHS CV PV CAROTID LEFT HIGHEST ICA: 0
OHS CV PV CAROTID RIGHT HIGHEST CCA: 124
OHS CV PV CAROTID RIGHT HIGHEST ICA: 66
RIGHT CCA DIST SYS: 124 CM/S
RIGHT CCA PROX SYS: 114 CM/S
RIGHT ECA SYS: 151 CM/S
RIGHT ICA DIST DIAS: 17 CM/S
RIGHT ICA DIST SYS: 48 CM/S
RIGHT ICA MID DIAS: 12 CM/S
RIGHT ICA MID SYS: 42 CM/S
RIGHT ICA PROX DIAS: 16 CM/S
RIGHT ICA PROX SYS: 66 CM/S
RIGHT VERTEBRAL SYS: 34 CM/S

## 2023-04-17 LAB — BACTERIA BLD CULT: NORMAL

## 2023-06-28 NOTE — Clinical Note
Defib pads placed on patient's anterior and posterior.   Consent: The patient's consent was obtained including but not limited to risks of crusting, scabbing, blistering, scarring, darker or lighter pigmentary change, recurrence, incomplete removal and infection. Post-Care Instructions: I reviewed with the patient in detail post-care instructions. Patient is to wear sunprotection, and avoid picking at any of the treated lesions. Pt may apply Vaseline to crusted or scabbing areas. Render Post-Care In The Note: No Method: liquid nitrogen Detail Level: Detailed Anesthesia Volume In Cc: 0 Post-Procedure Care (Optional): The wound was cleaned, and a pressure dressing was applied.  The patient received detailed post-op instructions.
